# Patient Record
Sex: FEMALE | Race: WHITE | NOT HISPANIC OR LATINO | Employment: OTHER | URBAN - METROPOLITAN AREA
[De-identification: names, ages, dates, MRNs, and addresses within clinical notes are randomized per-mention and may not be internally consistent; named-entity substitution may affect disease eponyms.]

---

## 2017-01-17 ENCOUNTER — MYC MEDICAL ADVICE (OUTPATIENT)
Dept: PEDIATRICS | Facility: CLINIC | Age: 52
End: 2017-01-17

## 2017-01-17 NOTE — TELEPHONE ENCOUNTER
Diveboard message routed to provider for review and response. Please advise.      Beatrice LuisCMA

## 2017-01-17 NOTE — Clinical Note
January 17, 2017      Jaimee Rodriguez  [address] [address]              To Whom It May Concern,    I'm the primary care physical for Jaimee Rodriguez. She has been under my care since 12/10/2012. She has history of cervical spine fusion and chronic neck pain. She was evaluated by pain management specialist through [facility] and was discharged from their service in November 2013 after an infected pain pump. Prior to the pain pump trial, she was treated with a number of medications from over the counter medication through prescription medications (NSAIDS, acetaminophen, gabapentin, duloxetine, codeine, hydrocodone, oxycodone, lidocaine patch, diazepam, and cyclobenzaprine), and non-pharmacologic therapy through the pain program (physical therapy, heat, ice, injection, rhizotomy, TENS unit, and pain pump). When she was discharged from the [facility] pain program, she was on Fentanyl patch 100 mcg every 48 hours (15 patch per month) and hydrocodone-acetaminophen 10/325 mg every 6 hours. She has a pain contract with me for continuing prescription of her pain medications. The current pain medication regimen has allowed her to function and she has been compliant with pain management agreement.     I'm requesting a review of her case and approve her continue use of fentanyl patch.       Should you have any questions, please feel free to contact me at the address above.          Sincerely,          Maki Topete MD-PhD

## 2017-01-17 NOTE — TELEPHONE ENCOUNTER
Routing [place] message to PCP for review and okay to initiate Prior Authorization for fentaNYL (DURAGESIC) 100 mcg/hr 72 hr patch.  Sola Madrigal, CMA

## 2017-01-18 NOTE — TELEPHONE ENCOUNTER
Letter done. Please fax. check with patient where this needs to go. Or  Check on file where the last letter wetn.

## 2017-01-23 ENCOUNTER — MYC REFILL (OUTPATIENT)
Dept: PEDIATRICS | Facility: CLINIC | Age: 52
End: 2017-01-23

## 2017-01-23 DIAGNOSIS — M43.22 FUSION OF SPINE OF CERVICAL REGION: ICD-10-CM

## 2017-01-23 DIAGNOSIS — Z79.891 LONG TERM CURRENT USE OF OPIATE ANALGESIC: ICD-10-CM

## 2017-01-23 DIAGNOSIS — G89.29 CHRONIC NECK PAIN: ICD-10-CM

## 2017-01-23 DIAGNOSIS — M54.2 CHRONIC NECK PAIN: ICD-10-CM

## 2017-01-23 RX ORDER — FENTANYL 100 UG/1
1 PATCH TRANSDERMAL
Qty: 15 PATCH | Refills: 0 | Status: SHIPPED | OUTPATIENT
Start: 2017-01-25 | End: 2017-02-22

## 2017-01-23 RX ORDER — HYDROCODONE BITARTRATE AND ACETAMINOPHEN 10; 325 MG/1; MG/1
1 TABLET ORAL EVERY 6 HOURS PRN
Qty: 120 TABLET | Refills: 0 | Status: SHIPPED | OUTPATIENT
Start: 2017-01-25 | End: 2017-02-22

## 2017-01-23 NOTE — TELEPHONE ENCOUNTER
Message from [place]:  Original authorizing provider: Maki Topete MD, MD Jaimee Rodriguez would like a refill of the following medications:  fentaNYL (DURAGESIC) 100 mcg/hr 72 hr patch [Maki Topete MD, MD]  HYDROcodone-acetaminophen (NORCO)  MG per tablet [Maki Topete MD, MD]    Preferred pharmacy: WRITTEN PRESCRIPTION REQUESTED    Comment:

## 2017-01-23 NOTE — TELEPHONE ENCOUNTER
fentaNYL (DURAGESIC) 100 mcg/hr 72 hr patch      Last Written Prescription Date:  12/26/16  Last Fill Quantity: 15 patch,   # refills: 0  Last Office Visit with [facility] or [facility] prescribing provider: 12/19/16  Future Office visit:    Next 5 appointments (look out 90 days)     Feb 02, 2017 11:30 AM   [facility]    [address] [facility] [address]   [phone]            Feb 02, 2017  1:00 PM   Return Visit with David Morris Perlman, MD   [facility]    [address] [facility] [address]   [phone]                   Routing refill request to provider for review/approval because:  Drug not on the [facility], [facility] or [facility] refill protocol or controlled substance    HYDROcodone-acetaminophen (NORCO)  MG per tablet      Last Written Prescription Date:  12/26/16  Last Fill Quantity: 120,   # refills: 0  Last Office Visit with [facility], [facility] or [facility] prescribing provider: 12/19/16  Future Office visit:    Next 5 appointments (look out 90 days)     Feb 02, 2017 11:30 AM   SHORT with PFT LAB   [facility]    [address] [facility] [address]   [phone]            Feb 02, 2017  1:00 PM   Return Visit with David Morris Perlman, MD   [facility])    [address] [facility] [address]   [phone]                   Routing refill request to provider for review/approval because:  Drug not on the [facility], [facility] or [facility] refill protocol or controlled substance

## 2017-01-24 NOTE — TELEPHONE ENCOUNTER
Please inform patient, refill/prescription approved and when prescription hard copy is ready to be picked up at .

## 2017-01-26 ENCOUNTER — MYC MEDICAL ADVICE (OUTPATIENT)
Dept: PEDIATRICS | Facility: CLINIC | Age: 52
End: 2017-01-26

## 2017-01-30 NOTE — TELEPHONE ENCOUNTER
Called [facility] and spoke with Joni in the Prior Authorization department on behave of the patient and provider. Joni states that clinic was faxed a Prior Authorization form on 01/18/17. This form was competed and returned back to [facility] on 01/27/17. Joni states that Prior Authorization for patient's fentaNYL (DURAGESIC) 100 mcg/hr 72 hr patch has been approved.    Attempt #1:  Left message for patient to return call to clinic. Clinic number given.  Need to inform patient that PA has been approved for fentaNYL (DURAGESIC) 100 mcg/hr 72 hr patch. Also need to make sure that patient has current script dated 01/25/17 to fill.  Sola Madrigal, CMA

## 2017-01-31 NOTE — TELEPHONE ENCOUNTER
Called patient to inform that prior authorization was approved for Fentanyl patches. Patient states that her pharmacy contacted her on 01/28/17 to inform. Patient states that she had a current prescription to take to the pharmacy to fill since approval of GIGI Madrigal CMA

## 2017-02-02 ENCOUNTER — OFFICE VISIT (OUTPATIENT)
Dept: PULMONOLOGY | Facility: CLINIC | Age: 52
End: 2017-02-02
Payer: COMMERCIAL

## 2017-02-02 ENCOUNTER — OFFICE VISIT (OUTPATIENT)
Dept: NURSING | Facility: CLINIC | Age: 52
End: 2017-02-02
Payer: COMMERCIAL

## 2017-02-02 VITALS
WEIGHT: 193 LBS | HEART RATE: 100 BPM | SYSTOLIC BLOOD PRESSURE: 143 MMHG | BODY MASS INDEX: 34.72 KG/M2 | OXYGEN SATURATION: 91 % | DIASTOLIC BLOOD PRESSURE: 87 MMHG

## 2017-02-02 DIAGNOSIS — J44.1 OBSTRUCTIVE CHRONIC BRONCHITIS WITH EXACERBATION (H): ICD-10-CM

## 2017-02-02 DIAGNOSIS — J43.2 CENTRILOBULAR EMPHYSEMA (H): Primary | ICD-10-CM

## 2017-02-02 DIAGNOSIS — J44.9 COPD, SEVERE (H): ICD-10-CM

## 2017-02-02 DIAGNOSIS — J43.2 CENTRILOBULAR EMPHYSEMA (H): ICD-10-CM

## 2017-02-02 PROCEDURE — 99213 OFFICE O/P EST LOW 20 MIN: CPT | Mod: 25 | Performed by: INTERNAL MEDICINE

## 2017-02-02 PROCEDURE — 94375 RESPIRATORY FLOW VOLUME LOOP: CPT | Performed by: INTERNAL MEDICINE

## 2017-02-02 PROCEDURE — 94729 DIFFUSING CAPACITY: CPT | Performed by: INTERNAL MEDICINE

## 2017-02-02 RX ORDER — ALBUTEROL SULFATE 90 UG/1
2 AEROSOL, METERED RESPIRATORY (INHALATION) EVERY 6 HOURS PRN
Qty: 1 INHALER | Refills: 11 | Status: SHIPPED | OUTPATIENT
Start: 2017-02-02 | End: 2017-08-03

## 2017-02-02 NOTE — NURSING NOTE
"Jaimee Rodriguez's goals for this visit include: COPD  She requests these members of her care team be copied on today's visit information: yes    PCP: Maki Topete    Referring Provider:  No referring provider defined for this encounter.    Chief Complaint   Patient presents with     COPD       Initial /87 mmHg  Pulse 100  Wt 87.544 kg (193 lb)  SpO2 91% Estimated body mass index is 34.72 kg/(m^2) as calculated from the following:    Height as of 12/19/16: 1.588 m (5' 2.5\").    Weight as of this encounter: 87.544 kg (193 lb).  BP completed using cuff size: regular    Do you need any medication refills at today's visit? no    "

## 2017-02-02 NOTE — PROGRESS NOTES
HISTORY OF PRESENT ILLNESS:  Jaimee Rodriguez is a 51-year-old female with COPD and pulmonary nodules who follows up today.  Overall, the patient feels that her breathing has been relatively stable since last visit 6 months ago.  She does notice some increased chest tightness on occasion.  She is also using oxygen much more regularly than she had been previously generally using 2-3 liters most of the time during the day and at night.  She does use her albuterol inhaler generally before significant activities, but is not using it on a regular basis.  She continues to use her Dulera and Spiriva.  Has mild productive cough.  Otherwise, she generally feels things are stable with no other new complaints today.  She is still smoking about a half pack of cigarettes daily.      PULMONARY FUNCTION TESTS:  Show moderate to severe obstruction with moderate reduction in DLCO.  There has been a decline in her pulmonary function tests since the last test.      ASSESSMENT AND PLAN:  A 51-year-old female with COPD and pulmonary nodules.   1.  COPD.  Symptoms are relatively stable, although she does have some increased chest tightness and I suspect she has increased air trapping and that is why we see such significant drop in her FVC on her pulmonary function tests today.  However, symptomatically she is not that different at this point.  We did discuss various options including prednisone, which we decided to hold off on that for now.  I think we are going to try to have her use her albuterol inhaler more regularly, try to use it 2-3 times daily to see if this helps relieve some of that chest tightness and air trapping.  She is on fairly maximal inhaler therapy already in terms of long-acting inhalers so there is not that much to add to this.  At some point, we would consider a course of prednisone to see if that helps improve her symptoms along the concern that we would end up in a situation where she would get worse as soon as the  prednisone came off.  I will plan to see her back in 6 months with pulmonary function tests.   2.  Pulmonary nodules.  The patient is in the lung cancer screening program and is due for a CT later this month.  We will review that.         DAVID M. PERLMAN, MD             D: 2017 13:55   T: 2017 15:49   MT: [id number]      Name:     TWYLA VANN   MRN:      [id number]        Account:      [id number]   :      1965           Visit Date:   2017      Document: [id number]

## 2017-02-02 NOTE — MR AVS SNAPSHOT
After Visit Summary   2/2/2017    Jaimee Rodriguez    MRN: [id number]           Patient Information     Date Of Birth          1965        Visit Information        Provider Department      2/2/2017 1:00 PM Perlman, David Morris, MD [facility]        Today's Diagnoses     Centrilobular emphysema (H)    -  1     COPD, severe (H)         COPD, severe         Obstructive chronic bronchitis with exacerbation (H)            Follow-ups after your visit        Follow-up notes from your care team     Return in about 6 months (around 8/2/2017).      Your next 10 appointments already scheduled     Aug 03, 2017 10:00 AM   Office Visit with PFT LAB   [facility]    [address] [place] [address] [phone]           Bring a current list of meds and any records pertaining to this visit.  For Physicals, please bring immunization records and any forms needing to be filled out.  Please arrive 10 minutes early to complete paperwork.            Aug 03, 2017 11:00 AM   Return Visit with David Morris Perlman, MD   [facility])    [address] [place] [id number] [phone]              Future tests that were ordered for you today     Open Future Orders        Priority Expected Expires Ordered    General PFT Lab (Please always keep checked) Routine  2/2/2018 2/2/2017    Pulmonary Function Test Routine  2/2/2018 2/2/2017    6 minute walk test Routine  2/2/2018 2/2/2017    General PFT Lab (Please always keep checked) Routine  2/2/2018 2/2/2017            Who to contact     If you have questions or need follow up information about today's clinic visit or your schedule please contact [facility] directly at [phone].  Normal or non-critical lab and imaging results will be communicated to you by MyChart, letter or phone within 4 business days after the clinic  has received the results. If you do not hear from us within 7 days, please contact the clinic through [employer] or phone. If you have a critical or abnormal lab result, we will notify you by phone as soon as possible.  Submit refill requests through [employer] or call your pharmacy and they will forward the refill request to us. Please allow 3 business days for your refill to be completed.          Additional Information About Your Visit        [employer] Information     [employer] gives you secure access to your electronic health record. If you see a primary care provider, you can also send messages to your care team and make appointments. If you have questions, please call your primary care clinic.  If you do not have a primary care provider, please call [phone] and they will assist you.      [employer] is an electronic gateway that provides easy, online access to your medical records. With [employer], you can request a clinic appointment, read your test results, renew a prescription or communicate with your care team.     To access your existing account, please contact your [facility] or call [phone] for assistance.        Care EveryWhere ID     This is your Care EveryWhere ID. This could be used by other organizations to access your [facility] medical records  [id number]        Your Vitals Were     Pulse Pulse Oximetry                100 91%           Blood Pressure from Last 3 Encounters:   02/02/17 143/87   12/19/16 167/102   09/21/16 138/84    Weight from Last 3 Encounters:   02/02/17 87.544 kg (193 lb)   12/19/16 87.952 kg (193 lb 14.4 oz)   09/21/16 80.287 kg (177 lb)                 Where to get your medicines      These medications were sent to [id number] - [place], [place] - [address]  [address] [place] [address], [facility] [address]     Phone:  [phone]    - albuterol 108 (90 BASE) MCG/ACT Inhaler  - mometasone-formoterol 200-5 MCG/ACT oral inhaler  -  tiotropium 2.5 MCG/ACT inhalation aerosol       Primary Care Provider Office Phone # Fax #    Maki Topete [phone]       [facility] [address] [facility] [address]        Thank you!     Thank you for choosing [facility]  for your care. Our goal is always to provide you with excellent care. Hearing back from our patients is one way we can continue to improve our services. Please take a few minutes to complete the written survey that you may receive in the mail after your visit with us. Thank you!             Your Updated Medication List - Protect others around you: Learn how to safely use, store and throw away your medicines at www.disposemymeds.org.          This list is accurate as of: 2/2/17  1:23 PM.  Always use your most recent med list.                   Brand Name Dispense Instructions for use    albuterol 108 (90 BASE) MCG/ACT Inhaler    PROAIR HFA/PROVENTIL HFA/VENTOLIN HFA    1 Inhaler    Inhale 2 puffs into the lungs every 6 hours as needed for shortness of breath / dyspnea or wheezing       cyclobenzaprine 10 MG tablet    FLEXERIL    120 tablet    Take 1 tablet (10 mg) by mouth 3 times daily as needed for muscle spasms       * fentaNYL 100 mcg/hr 72 hr patch    DURAGESIC    15 patch    Place 1 patch onto the skin every 48 hours       * fentaNYL 100 mcg/hr 72 hr patch    DURAGESIC    15 patch    Place 1 patch onto the skin every 48 hours       gabapentin 600 MG tablet    NEURONTIN    90 tablet    TAKE 1 TABLET BY MOUTH THREE TIMES DAILY       GUAIATUSSIN -10 MG/5ML Soln solution   Generic drug:  guaiFENesin-codeine          HYDROcodone-acetaminophen  MG per tablet    NORCO    120 tablet    Take 1 tablet by mouth every 6 hours as needed for pain       methyl salicylate-menthol Oint     1 Tube    Apply 5 g topically every 6 hours as needed.       mometasone-formoterol 200-5 MCG/ACT oral inhaler    DULERA    1 Inhaler    Inhale 2 puffs into the  lungs 2 times daily       * order for DME     1 Box    Pain relieving creams, lotions, gels or patches ( containing Menthol) for occasional use for pain       * order for DME     1 Units    Equipment being ordered: Optichamber       * order for DME     1 Device    Equipment being ordered: super feet size C       * order for DME     1 Device    Equipment being ordered: Oxygen 4 lpm via nasal cannula continuous flow with any exertion and 2 lpm continuous flow with nasal cannula at rest and at night. Provide portability.  Length of need 99.       * order for DME     1 Device    Equipment being ordered: Superfeet - Blue - Size C       polyethylene glycol powder    MIRALAX    510 g    Take 17 g (1 capful) by mouth daily as needed for constipation       tiotropium 2.5 MCG/ACT inhalation aerosol    SPIRIVA RESPIMAT    12 g    Inhale 2 puffs into the lungs daily       traZODone 50 MG tablet    DESYREL    90 tablet    Take 1-2 tablets ( mg) by mouth At Bedtime AT BEDTIME FOR SLEEP       zolpidem 5 MG tablet    AMBIEN    30 tablet    Take 1 tablet (5 mg) by mouth nightly as needed for sleep       * Notice:  This list has 7 medication(s) that are the same as other medications prescribed for you. Read the directions carefully, and ask your doctor or other care provider to review them with you.

## 2017-02-02 NOTE — PROGRESS NOTES
Reason for Visit  Jaimee Rodriguez is a 51 year old year old female who is being seen for COPD    ILD HPI    See Dictated Note      Current Outpatient Prescriptions   Medication     mometasone-formoterol (DULERA) 200-5 MCG/ACT oral inhaler     tiotropium (SPIRIVA RESPIMAT) 2.5 MCG/ACT inhalation aerosol     albuterol (PROAIR HFA/PROVENTIL HFA/VENTOLIN HFA) 108 (90 BASE) MCG/ACT Inhaler     fentaNYL (DURAGESIC) 100 mcg/hr 72 hr patch     HYDROcodone-acetaminophen (NORCO)  MG per tablet     zolpidem (AMBIEN) 5 MG tablet     gabapentin (NEURONTIN) 600 MG tablet     traZODone (DESYREL) 50 MG tablet     cyclobenzaprine (FLEXERIL) 10 MG tablet     polyethylene glycol (MIRALAX) powder     fentaNYL (DURAGESIC) 100 mcg/hr patch 72 hr     order for DME     GUAIATUSSIN -10 MG/5ML SYRP     ORDER FOR DME     ORDER FOR DME     ORDER FOR DME     ORDER FOR DME     methyl salicylate-menthol (BENGAY) OINT     [DISCONTINUED] tiotropium (SPIRIVA RESPIMAT) 2.5 MCG/ACT inhalation aerosol     [DISCONTINUED] mometasone-formoterol (DULERA) 200-5 MCG/ACT oral inhaler     [DISCONTINUED] albuterol (PROAIR HFA, PROVENTIL HFA, VENTOLIN HFA) 108 (90 BASE) MCG/ACT inhaler     [DISCONTINUED] fluticasone-salmeterol (ADVAIR) 500-50 MCG/DOSE diskus inhaler     No current facility-administered medications for this visit.     Allergies   Allergen Reactions     Morphine      Past Medical History   Diagnosis Date     Contact dermatitis and other eczema, due to unspecified cause      eczema     Other and unspecified ovarian cyst      sydni R     Irregular menstrual cycle      Endometriosis, site unspecified      R uterosacral ligament/surgically removed     Other acne      NONSPECIFIC MEDICAL HISTORY      Mood swings, Irritability     Cervical spondylosis without myelopathy      With radiculopathy.     Tobacco use disorder 11/14/2001       Past Surgical History   Procedure Laterality Date     C ligate fallopian tube  1988     S/P tubal ligation      C total abdom hysterectomy       not total     pt did not have ovaries removed     C  delivery only  ,,     , Low Cervical     C appendectomy       Hc enlarge breast with implant       Surgical history of -   10/18/2005     C6 corpectomy with iliac crest autograft fixation. [facility]       Social History     Social History     Marital Status:      Spouse Name: N/A     Number of Children: N/A     Years of Education: N/A     Occupational History     Not on file.     Social History Main Topics     Smoking status: Current Every Day Smoker -- 0.75 packs/day for 35 years     Types: Cigarettes     Smokeless tobacco: Never Used      Comment: started age 14.     Alcohol Use: 0.0 oz/week     0 Standard drinks or equivalent per week      Comment: occ     Drug Use: No     Sexual Activity:     Partners: Male     Birth Control/ Protection: Surgical     Other Topics Concern      Service No     Blood Transfusions No     Caffeine Concern No     Occupational Exposure No     Hobby Hazards No     Sleep Concern Yes     Stress Concern No     Weight Concern No     Special Diet No     not a milk drinker     Back Care No     Exercise No     walks at work     Seat Belt Yes     Self-Exams No     Social History Narrative       Family History   Problem Relation Age of Onset     Arthritis Father      DIABETES Maternal Grandmother      CEREBROVASCULAR DISEASE Mother      CANCER Maternal Aunt      breast     Chronic Obstructive Pulmonary Disease Mother        ROS Pulmonary    A complete ROS was otherwise negative except as noted in the HPI.  Filed Vitals:    17 1304   BP: 143/87   Pulse: 100   Weight: 87.544 kg (193 lb)   SpO2: 91%     Exam:   GENERAL APPEARANCE: Well developed, well nourished, alert, and in no apparent distress.  NECK: supple, no masses, no thyromegaly.  LYMPHATICS: No significant axillary, cervical, or supraclavicular nodes.  RESP: normal percussion, decreased  BS bilaterally, scattered exp wheeze  CV: Normal S1, S2, regular rhythm, normal rate, no rub, no murmur,  no gallop, no LE edema.   ABDOMEN:  Bowel sounds normal, soft, nontender, no HSM or masses.   MS: extremities normal- no clubbing, no cyanosis.  NEURO: Mentation intact, speech normal, normal strength and tone, normal gait and stance  PSYCH: mentation appears normal. and affect normal/bright  Results: I have reviewed all imaging, PFTs and other relavent tests, please see below for details, PFT and imaging results were reviewed with the patient.  See Dictated Note  Assessment and plan:  See Dictated Note    CBC   Recent Labs   Lab Test  12/19/16   0855  09/26/13   1422   RBC  4.61  4.45   HGB  13.8  13.5   HCT  41.5  39.5   PLT  280  409       Basic Metabolic Panel  Recent Labs   Lab Test  12/19/16   0855  02/04/15   1201   NA  141  140   POTASSIUM  4.4  4.1   CHLORIDE  104  103   CO2  30  33*   BUN  13  20   GLC  100*  94   JOSEPH  9.0  9.1       INR  No results for input(s): INR in the last 75097 hours.    PFT  PFT 2/2/2017   FVC 2.41   FEV1 1.14   FVC% 74   FEV1% 44           CC:

## 2017-02-22 ENCOUNTER — MYC REFILL (OUTPATIENT)
Dept: PEDIATRICS | Facility: CLINIC | Age: 52
End: 2017-02-22

## 2017-02-22 DIAGNOSIS — Z79.891 LONG TERM CURRENT USE OF OPIATE ANALGESIC: ICD-10-CM

## 2017-02-22 DIAGNOSIS — G89.29 CHRONIC NECK PAIN: ICD-10-CM

## 2017-02-22 DIAGNOSIS — M43.22 FUSION OF SPINE OF CERVICAL REGION: ICD-10-CM

## 2017-02-22 DIAGNOSIS — M54.2 CHRONIC NECK PAIN: ICD-10-CM

## 2017-02-24 RX ORDER — FENTANYL 100 UG/1
1 PATCH TRANSDERMAL
Qty: 15 PATCH | Refills: 0 | Status: SHIPPED | OUTPATIENT
Start: 2017-02-24 | End: 2017-03-23

## 2017-02-24 RX ORDER — HYDROCODONE BITARTRATE AND ACETAMINOPHEN 10; 325 MG/1; MG/1
1 TABLET ORAL EVERY 6 HOURS PRN
Qty: 120 TABLET | Refills: 0 | Status: SHIPPED | OUTPATIENT
Start: 2017-02-24 | End: 2017-03-23

## 2017-03-02 DIAGNOSIS — F51.04 CHRONIC INSOMNIA: ICD-10-CM

## 2017-03-02 RX ORDER — ZOLPIDEM TARTRATE 5 MG/1
5 TABLET ORAL
Qty: 30 TABLET | Refills: 3 | Status: SHIPPED | OUTPATIENT
Start: 2017-03-02 | End: 2017-11-20

## 2017-03-02 NOTE — TELEPHONE ENCOUNTER
zolpidem (AMBIEN) 5 MG tablet      Last Written Prescription Date:  12/19/2016  Last Fill Quantity: 30 tablets,   # refills: 1 ordered   Last Office Visit with [facility] or [facility] prescribing provider: Last seen Dr. Topete on 12/19/2016  Future Office visit:       Routing refill request to provider for review/approval because:  Drug not on the [facility], [facility] or [facility] refill protocol or controlled substance

## 2017-03-03 NOTE — TELEPHONE ENCOUNTER
Please inform patient, refill/prescriptioni approved. Please fax prescription to designated pharmacy.

## 2017-03-06 ENCOUNTER — TELEPHONE (OUTPATIENT)
Dept: PULMONOLOGY | Facility: CLINIC | Age: 52
End: 2017-03-06

## 2017-03-06 NOTE — TELEPHONE ENCOUNTER
"Received fax from [place] [facility] with \"[place] Uniform Form for Prescription Drug Prior Authorization (PA) Requests and Formulary Exceptions\" for Spiriva Respimat. Unable to determine purpose of paperwork or what request is. Upon review of chart, Dr. Perlman sent Rx for Spiriva Respimat 2/2/17 to [facility] in [place].     Contacted [facility] and spoke to Pharmacist Rula. She states that Rx is going through on their side and no PA is needed. She advises to disregard paperwork. She states pt has already picked up medication. Paperwork sent to [facility] to be scanned into pt's chart.    Janie MOSLEY, RN, BSN  Pulmonary Care Coordinator         "

## 2017-03-09 ENCOUNTER — MYC MEDICAL ADVICE (OUTPATIENT)
Dept: PEDIATRICS | Facility: CLINIC | Age: 52
End: 2017-03-09

## 2017-03-09 DIAGNOSIS — Z12.31 ENCOUNTER FOR SCREENING MAMMOGRAM FOR BREAST CANCER: Primary | ICD-10-CM

## 2017-03-09 DIAGNOSIS — R91.8 PULMONARY NODULES: ICD-10-CM

## 2017-03-11 LAB
DLCOUNC-%PRED-PRE: 54 %
DLCOUNC-PRE: 11.8 ML/MIN/MMHG
DLCOUNC-PRED: 21.84 ML/MIN/MMHG
ERV-%PRED-PRE: 81 %
ERV-PRE: 0.41 L
ERV-PRED: 0.5 L
EXPTIME-PRE: 9.67 SEC
FEF2575-%PRED-PRE: 16 %
FEF2575-PRE: 0.41 L/SEC
FEF2575-PRED: 2.52 L/SEC
FEFMAX-%PRED-PRE: 56 %
FEFMAX-PRE: 3.63 L/SEC
FEFMAX-PRED: 6.41 L/SEC
FEV1-%PRED-PRE: 44 %
FEV1-PRE: 1.14 L
FEV1FEV6-PRE: 52 %
FEV1FEV6-PRED: 82 %
FEV1FVC-PRE: 48 %
FEV1FVC-PRED: 80 %
FEV1SVC-PRE: 44 %
FEV1SVC-PRED: 79 %
FIFMAX-PRE: 4.11 L/SEC
FVC-%PRED-PRE: 74 %
FVC-PRE: 2.41 L
FVC-PRED: 3.21 L
IC-%PRED-PRE: 79 %
IC-PRE: 2.19 L
IC-PRED: 2.74 L
VA-%PRED-PRE: 89 %
VA-PRE: 4.3 L
VC-%PRED-PRE: 80 %
VC-PRE: 2.6 L
VC-PRED: 3.24 L

## 2017-03-12 ENCOUNTER — HOSPITAL ENCOUNTER (OUTPATIENT)
Facility: CLINIC | Age: 52
Setting detail: SPECIMEN
Discharge: HOME | End: 2017-03-12
Admitting: INTERNAL MEDICINE
Payer: COMMERCIAL

## 2017-03-12 PROCEDURE — 82274 ASSAY TEST FOR BLOOD FECAL: CPT | Performed by: INTERNAL MEDICINE

## 2017-03-14 DIAGNOSIS — Z12.11 COLON CANCER SCREENING: ICD-10-CM

## 2017-03-14 LAB — HEMOCCULT STL QL IA: NEGATIVE

## 2017-03-14 NOTE — PROGRESS NOTES
Melody Rodriguez,    Attached are your test results.  -FIT test (screening test for colon cancer) was normal. ADVISE - rechecking in 1 year.   Please contact us if you have any questions.    Heidi Borja, CNP

## 2017-03-15 ENCOUNTER — RADIANT APPOINTMENT (OUTPATIENT)
Dept: MAMMOGRAPHY | Facility: CLINIC | Age: 52
End: 2017-03-15
Attending: INTERNAL MEDICINE
Payer: COMMERCIAL

## 2017-03-15 ENCOUNTER — RADIANT APPOINTMENT (OUTPATIENT)
Dept: CT IMAGING | Facility: CLINIC | Age: 52
End: 2017-03-15
Attending: INTERNAL MEDICINE
Payer: COMMERCIAL

## 2017-03-15 DIAGNOSIS — Z12.31 ENCOUNTER FOR SCREENING MAMMOGRAM FOR BREAST CANCER: ICD-10-CM

## 2017-03-15 DIAGNOSIS — R91.8 PULMONARY NODULES: ICD-10-CM

## 2017-03-15 LAB — RADIOLOGIST FLAGS: NORMAL

## 2017-03-15 PROCEDURE — G0202 SCR MAMMO BI INCL CAD: HCPCS | Performed by: RADIOLOGY

## 2017-03-15 PROCEDURE — 71250 CT THORAX DX C-: CPT | Performed by: RADIOLOGY

## 2017-03-17 NOTE — PROGRESS NOTES
Dear Jaimee,   Here are your recent results.   -- chest CT showed new lung nodules. The old ones are stable.   -- radiologist recommended repeat CT in 6 months.   -- do your best to quit smoking.     Please call or Mychart to our office if you have further questions.     Maki Topete MD

## 2017-03-20 ENCOUNTER — TELEPHONE (OUTPATIENT)
Dept: PEDIATRICS | Facility: CLINIC | Age: 52
End: 2017-03-20

## 2017-03-23 ENCOUNTER — MYC REFILL (OUTPATIENT)
Dept: PEDIATRICS | Facility: CLINIC | Age: 52
End: 2017-03-23

## 2017-03-23 DIAGNOSIS — Z79.891 LONG TERM CURRENT USE OF OPIATE ANALGESIC: ICD-10-CM

## 2017-03-23 DIAGNOSIS — M54.2 CHRONIC NECK PAIN: ICD-10-CM

## 2017-03-23 DIAGNOSIS — G89.29 CHRONIC NECK PAIN: ICD-10-CM

## 2017-03-23 DIAGNOSIS — M43.22 FUSION OF SPINE OF CERVICAL REGION: ICD-10-CM

## 2017-03-24 RX ORDER — FENTANYL 100 UG/1
1 PATCH TRANSDERMAL
Qty: 15 PATCH | Refills: 0 | Status: SHIPPED | OUTPATIENT
Start: 2017-03-24 | End: 2017-04-24

## 2017-03-24 RX ORDER — HYDROCODONE BITARTRATE AND ACETAMINOPHEN 10; 325 MG/1; MG/1
1 TABLET ORAL EVERY 6 HOURS PRN
Qty: 120 TABLET | Refills: 0 | Status: SHIPPED | OUTPATIENT
Start: 2017-03-24 | End: 2017-04-24

## 2017-03-24 NOTE — TELEPHONE ENCOUNTER
fentaNYL (DURAGESIC) 100 mcg/hr 72 hr patch      Last Written Prescription Date:  2/24/17  Last Fill Quantity: 15 patch,   # refills: 0  Last Office Visit with [facility], [facility] or  Health prescribing provider: 12/19/16  Future Office visit:       Routing refill request to provider for review/approval because:  Drug not on the [facility], P or  Health refill protocol or controlled substance    HYDROcodone-acetaminophen (NORCO)  MG per tablet      Last Written Prescription Date:  2/24/17  Last Fill Quantity: 120,   # refills: 0  Last Office Visit with [facility], [facility] or [facility] prescribing provider: 12/19/16  Future Office visit:       Routing refill request to provider for review/approval because:  Drug not on the [facility], P or  Health refill protocol or controlled substance    Dr. Maki Topete out of office until 4/5/17. Routed to provider pool for review.  Emory Man, CMA

## 2017-03-24 NOTE — TELEPHONE ENCOUNTER
Message from Noet:  Original authorizing provider: Maki Topete MD, MD Hartjony FITZPATRICKIlda Velásquezcharlie would like a refill of the following medications:  fentaNYL (DURAGESIC) 100 mcg/hr 72 hr patch [Maki Topete MD, MD]  HYDROcodone-acetaminophen (NORCO)  MG per tablet [Maki Topete MD, MD]    Preferred pharmacy: WRITTEN PRESCRIPTION REQUESTED    Comment:  The written scripts can go to the [facility]. Let me know when this happens as I need to request the pharmacy to fill the Fentanyl patches with the Mylan brand as they stay on better

## 2017-03-29 ENCOUNTER — TELEPHONE (OUTPATIENT)
Dept: PEDIATRICS | Facility: CLINIC | Age: 52
End: 2017-03-29

## 2017-03-29 NOTE — TELEPHONE ENCOUNTER
[facility] Call Center    Phone Message    Name of Caller: Shubham    Phone Number: Other phone number:  [phone]    Best time to return call: Any    May a detailed message be left on voicemail: yes    Relation to patient: Other Name: Shubham  Relationship: [facility] [employer]  Is there legal documentation in chart to discuss information with this person: NA    Reason for Call: Shubham from [employer] called to say that Jaimee is going to be scheduling a visit with Dr. Topete.  Shubham said that Jaimee is switching to Medicare and [facility] [employer] will be doing her supplemental oxygen.  For the insurance to cover the home oxygen Jaimee is required to have a face to face with Dr. Topete and discuss at length Jaimee's COPD, and the necessity for oxygen. Shubham also requested an order for PFT including a six minute walk.  Shubham is sending a new order to be signed for oxygen.  Thank you.     Action Taken: Message routed to:  Primary Care p [phone]

## 2017-04-03 ENCOUNTER — OFFICE VISIT (OUTPATIENT)
Dept: NURSING | Facility: CLINIC | Age: 52
End: 2017-04-03

## 2017-04-03 DIAGNOSIS — J43.2 CENTRILOBULAR EMPHYSEMA (H): ICD-10-CM

## 2017-04-03 NOTE — MR AVS SNAPSHOT
After Visit Summary   4/3/2017    Jaimee Rodriguez    MRN: [id number]           Patient Information     Date Of Birth          1965        Visit Information        Provider Department      4/3/2017 10:00 AM PFT LAB [facility]        Today's Diagnoses     Centrilobular emphysema (H)           Follow-ups after your visit        Your next 10 appointments already scheduled     Aug 03, 2017 10:00 AM CDT   Office Visit with PFT LAB   [facility] ([facility])    [address] [place] [address] [phone]           Bring a current list of meds and any records pertaining to this visit.  For Physicals, please bring immunization records and any forms needing to be filled out.  Please arrive 10 minutes early to complete paperwork.            Aug 03, 2017 11:00 AM CDT   Return Visit with David Morris Perlman, MD   [facility])    [address] [place] [address] [phone]              Who to contact     If you have questions or need follow up information about today's clinic visit or your schedule please contact [facility] directly at [phone].  Normal or non-critical lab and imaging results will be communicated to you by MyChart, letter or phone within 4 business days after the clinic has received the results. If you do not hear from us within 7 days, please contact the clinic through [employer] or phone. If you have a critical or abnormal lab result, we will notify you by phone as soon as possible.  Submit refill requests through [employer] or call your pharmacy and they will forward the refill request to us. Please allow 3 business days for your refill to be completed.          Additional Information About Your Visit        [employer] Information     [employer] gives you secure access to your electronic health record. If you see a primary care provider, you can also  send messages to your care team and make appointments. If you have questions, please call your primary care clinic.  If you do not have a primary care provider, please call [phone] and they will assist you.      [employer] is an electronic gateway that provides easy, online access to your medical records. With [employer], you can request a clinic appointment, read your test results, renew a prescription or communicate with your care team.     To access your existing account, please contact your [facility] or call [phone] for assistance.        Care EveryWhere ID     This is your Care EveryWhere ID. This could be used by other organizations to access your [facility] medical records  [id number]         Blood Pressure from Last 3 Encounters:   02/02/17 143/87   12/19/16 (!) 167/102   09/21/16 138/84    Weight from Last 3 Encounters:   02/02/17 87.5 kg (193 lb)   12/19/16 88 kg (193 lb 14.4 oz)   09/21/16 80.3 kg (177 lb)              We Performed the Following     6 minute walk test     6 minute walk test     General PFT Lab (Please always keep checked)     General PFT Lab (Please always keep checked)     Pulmonary Function Test        Primary Care Provider Office Phone # Fax #    Maki Topete [phone]       [facility] [address] [facility] [address]        Thank you!     Thank you for choosing [facility]  for your care. Our goal is always to provide you with excellent care. Hearing back from our patients is one way we can continue to improve our services. Please take a few minutes to complete the written survey that you may receive in the mail after your visit with us. Thank you!             Your Updated Medication List - Protect others around you: Learn how to safely use, store and throw away your medicines at www.disposemymeds.org.          This list is accurate as of: 4/3/17 11:59 PM.  Always use your most recent med list.                    Brand Name Dispense Instructions for use    albuterol 108 (90 BASE) MCG/ACT Inhaler    PROAIR HFA/PROVENTIL HFA/VENTOLIN HFA    1 Inhaler    Inhale 2 puffs into the lungs every 6 hours as needed for shortness of breath / dyspnea or wheezing       cyclobenzaprine 10 MG tablet    FLEXERIL    120 tablet    Take 1 tablet (10 mg) by mouth 3 times daily as needed for muscle spasms       * fentaNYL 100 mcg/hr 72 hr patch    DURAGESIC    15 patch    Place 1 patch onto the skin every 48 hours       * fentaNYL 100 mcg/hr 72 hr patch    DURAGESIC    15 patch    Place 1 patch onto the skin every 48 hours       gabapentin 600 MG tablet    NEURONTIN    90 tablet    TAKE 1 TABLET BY MOUTH THREE TIMES DAILY       GUAIATUSSIN -10 MG/5ML Soln solution   Generic drug:  guaiFENesin-codeine          HYDROcodone-acetaminophen  MG per tablet    NORCO    120 tablet    Take 1 tablet by mouth every 6 hours as needed for pain       methyl salicylate-menthol Oint     1 Tube    Apply 5 g topically every 6 hours as needed.       mometasone-formoterol 200-5 MCG/ACT oral inhaler    DULERA    1 Inhaler    Inhale 2 puffs into the lungs 2 times daily       * order for DME     1 Box    Pain relieving creams, lotions, gels or patches ( containing Menthol) for occasional use for pain       * order for DME     1 Units    Equipment being ordered: Optichamber       * order for DME     1 Device    Equipment being ordered: super feet size C       * order for DME     1 Device    Equipment being ordered: Oxygen 4 lpm via nasal cannula continuous flow with any exertion and 2 lpm continuous flow with nasal cannula at rest and at night. Provide portability.  Length of need 99.       * order for DME     1 Device    Equipment being ordered: Superfeet - Blue - Size C       polyethylene glycol powder    MIRALAX    510 g    Take 17 g (1 capful) by mouth daily as needed for constipation       tiotropium 2.5 MCG/ACT inhalation aerosol    SPIRIVA  RESPIMAT    12 g    Inhale 2 puffs into the lungs daily       traZODone 50 MG tablet    DESYREL    90 tablet    Take 1-2 tablets ( mg) by mouth At Bedtime AT BEDTIME FOR SLEEP       zolpidem 5 MG tablet    AMBIEN    30 tablet    Take 1 tablet (5 mg) by mouth nightly as needed for sleep       * Notice:  This list has 7 medication(s) that are the same as other medications prescribed for you. Read the directions carefully, and ask your doctor or other care provider to review them with you.

## 2017-04-03 NOTE — TELEPHONE ENCOUNTER
This needs to go to her pulmonologist who just saw her in February. Should be within the Medicare required Face to Face encounter period.

## 2017-04-04 NOTE — TELEPHONE ENCOUNTER
Shubham from [facility] returned call to clinic and states pt's insurance changed and she is having to transfer to [facility] from [facility] for DME. She states pt needs to be seen ASAP and past visits will not be sufficient for documentation. She states pt had 6 min walk done yesterday and face to face visit needs to be within 30 days of this test. She states pt was using [facility] and needs to transfer her service ASAP.    Upon review of Dr. Perlman's schedule first opening of 5/4/17 will not meet 30 day criteria (would be 31 days). Encounter routed back to Dr. Topete and Carla Lan RN to make them aware of need for pt to have face to face visit.    Janie MOSLEY RN, BSN  Pulmonary Care Coordinator

## 2017-04-04 NOTE — PROGRESS NOTES
6 Min Walk Note:  1.  Walked patient in gtz while monitoring with oximetry on RA. Patieint walked 750 feet with 1 rest; lowest SPO2 85%.  2.   After rest, walked patient on 2 LPM while monitoring on oximetry.  Patient walked 1050 with no rests; lowest SPO2 91% on 2 LPM.

## 2017-04-04 NOTE — TELEPHONE ENCOUNTER
Upon review of chart, 6 min walk test was completed yesterday 4/3/17. Pt's LOV with Dr. Perlman was 2/2/17 and next scheduled appt is 8/3/17. His next opening is currently 5/4/17. Attempted to contact Shubham to clarify timeline for face to face visit, verify she has correct fax number, and advise her of which provider will be ordering MD for pt's 02.    Janie MOSLEY RN, BSN  Pulmonary Care Coordinator

## 2017-04-05 NOTE — TELEPHONE ENCOUNTER
I called patient and we scheduled an appt for her to see Dr. Topete on 04/06/17 to follow up on her COPD and switching to Medicare insurance so now her DME will be through [facility] instead of [facility].    Maribell Macias CMA

## 2017-04-05 NOTE — TELEPHONE ENCOUNTER
Routed to Dr. Topete to address.    Carla Lan RN,   [facility], [facility]

## 2017-04-06 ENCOUNTER — OFFICE VISIT (OUTPATIENT)
Dept: PEDIATRICS | Facility: CLINIC | Age: 52
End: 2017-04-06
Payer: COMMERCIAL

## 2017-04-06 VITALS
OXYGEN SATURATION: 93 % | WEIGHT: 201.4 LBS | SYSTOLIC BLOOD PRESSURE: 143 MMHG | BODY MASS INDEX: 35.68 KG/M2 | TEMPERATURE: 97.7 F | DIASTOLIC BLOOD PRESSURE: 93 MMHG | HEIGHT: 63 IN | HEART RATE: 85 BPM

## 2017-04-06 DIAGNOSIS — M62.838 MUSCLE SPASTICITY: Primary | ICD-10-CM

## 2017-04-06 DIAGNOSIS — G89.29 CHRONIC NECK PAIN: ICD-10-CM

## 2017-04-06 DIAGNOSIS — F51.04 CHRONIC INSOMNIA: ICD-10-CM

## 2017-04-06 DIAGNOSIS — M54.2 CHRONIC NECK PAIN: ICD-10-CM

## 2017-04-06 PROCEDURE — 99213 OFFICE O/P EST LOW 20 MIN: CPT | Performed by: INTERNAL MEDICINE

## 2017-04-06 RX ORDER — ESZOPICLONE 2 MG/1
2 TABLET, FILM COATED ORAL AT BEDTIME
Qty: 30 TABLET | Refills: 3 | Status: SHIPPED | OUTPATIENT
Start: 2017-04-06 | End: 2017-11-20

## 2017-04-06 NOTE — PROGRESS NOTES
"  SUBJECTIVE:                                                    Jaimee Rodriguez is a 51 year old female who presents to clinic today for the following health issues:      Medication Followup of  ALL MEDICATIONS    Taking Medication as prescribed: yes    Side Effects:  None    Medication Helping Symptoms:  Pt. States \"so so\"    Pt. Would like to discuss Quanitity limit on certain meds. Today per insurance.     This appointment was originally scheduled for face to face encounter for oxygen order due to insurance change but this is no longer necessary.   She does need to have some drugs changes.     Fentanyl patch q48 hours, needs quantity limit PA. Needs letter for this.   Flexeril, needs FDA indicated use and dosing only. Per her formulary, other muscle relaxer covered are baclofen, dantrolene, and tizanidine.   Ambien CR 6.25 - needs a different drug. Trazodone didn't help before, ambien didn't help. Sleeps only 1 1/2 hour and will wake up. Wakes up easily. Per her formulary, Lunesta is covered as tier 2.          Problem list, Medication list, Allergies, and Medical/Social/Surgical histories reviewed in [facility] and updated as appropriate.    OBJECTIVE:                                                    BP (!) 143/93 (BP Location: Right arm, Patient Position: Chair, Cuff Size: Adult Large)  Pulse 85  Temp 97.7  F (36.5  C) (Temporal)  Ht 5' 2.5\" (1.588 m)  Wt 201 lb 6.4 oz (91.4 kg)  SpO2 93%  BMI 36.25 kg/m2    GEN: healthy, alert and no distress       Diagnostic test results:  No results found for this or any previous visit (from the past 24 hour(s)).       ASSESSMENT/PLAN:                                                      51 year old female with the following diagnoses and treatment plan:      ICD-10-CM    1. Muscle spasticity M62.838 tiZANidine (ZANAFLEX) 4 MG tablet   2. Chronic neck pain M54.2 tiZANidine (ZANAFLEX) 4 MG tablet    G89.29    3. Chronic insomnia F51.04 eszopiclone (LUNESTA) 2 MG tablet "       -- changed her med's per insurance requirement. Letter for Fentanyl done and will fax to insurance today.       Maki Topete MD-PhD  [facility]    (Note: Chart documentation was done in part with Dragon Voice Recognition software. Although reviewed after completion, some word and grammatical errors may remain.)

## 2017-04-06 NOTE — NURSING NOTE
"Chief Complaint   Patient presents with     Recheck Medication       Initial BP (!) 143/93 (BP Location: Right arm, Patient Position: Chair, Cuff Size: Adult Large)  Pulse 85  Temp 97.7  F (36.5  C) (Temporal)  Ht 5' 2.5\" (1.588 m)  Wt 201 lb 6.4 oz (91.4 kg)  SpO2 93%  BMI 36.25 kg/m2 Estimated body mass index is 36.25 kg/(m^2) as calculated from the following:    Height as of this encounter: 5' 2.5\" (1.588 m).    Weight as of this encounter: 201 lb 6.4 oz (91.4 kg).  Medication Reconciliation: complete     Purvi Diaz, RUSLAN    "

## 2017-04-06 NOTE — LETTER
April 6, 2017      Jaimee Rodriguez  [address] [address]        [facility]  Fax: [phone]        To Whom It May Concern,    I'm the primary care physical for Jaimee Rodriguez. She has been under my care since 12/10/2012. She has history of cervical spine fusion and chronic neck pain. She was evaluated by pain management specialist through [facility] and was discharged from their service in November 2013 after an infected pain pump. Prior to the pain pump trial, she was treated with a number of medications from over the counter medication through prescription medications (NSAIDS, acetaminophen, gabapentin, duloxetine, codeine, hydrocodone, oxycodone, lidocaine patch, diazepam, and cyclobenzaprine), and non-pharmacologic therapy through the pain program (physical therapy, heat, ice, injection, rhizotomy, TENS unit, and pain pump). When she was discharged from the [facility] pain program, she was on Fentanyl patch 100 mcg every 48 hours (15 patch per month) and hydrocodone-acetaminophen 10/325 mg every 6 hours. She has a pain contract with me for continuing prescription of her pain medications. The current pain medication regimen has allowed her to function and she has been compliant with pain management agreement.     I'm requesting a review of her case and approve her continue use of fentanyl patch.       Should you have any questions, please feel free to contact me at the address above.          Sincerely,          Maki Topete MD-PhD

## 2017-04-06 NOTE — MR AVS SNAPSHOT
After Visit Summary   4/6/2017    Jaimee Rodriguez    MRN: [id number]           Patient Information     Date Of Birth          1965        Visit Information        Provider Department      4/6/2017 1:20 PM Maki Topete MD [facility]        Today's Diagnoses     Muscle spasticity    -  1    Chronic neck pain        Chronic insomnia          Care Instructions    Medication(s) prescribed today:    Orders Placed This Encounter   Medications     tiZANidine (ZANAFLEX) 4 MG tablet     Sig: Take 2 tablets (8 mg) by mouth 3 times daily     Dispense:  90 tablet     Refill:  3     eszopiclone (LUNESTA) 2 MG tablet     Sig: Take 1 tablet (2 mg) by mouth At Bedtime     Dispense:  30 tablet     Refill:  3             Medication discontinued today:  Medications Discontinued During This Encounter   Medication Reason     cyclobenzaprine (FLEXERIL) 10 MG tablet Reorder                Follow-ups after your visit        Your next 10 appointments already scheduled     Aug 03, 2017 10:00 AM CDT   Office Visit with PFT LAB   [facility] ([facility])    [address] [place] [address] [phone]           Bring a current list of meds and any records pertaining to this visit.  For Physicals, please bring immunization records and any forms needing to be filled out.  Please arrive 10 minutes early to complete paperwork.            Aug 03, 2017 11:00 AM CDT   Return Visit with David Morris Perlman, MD   [facility] ([facility])    [address] [place] [address] [phone]              Who to contact     If you have questions or need follow up information about today's clinic visit or your schedule please contact [facility] directly at [phone].  Normal or non-critical lab and imaging results will be communicated to you by MyChart, letter or phone within 4 business days after the  "clinic has received the results. If you do not hear from us within 7 days, please contact the clinic through [employer] or phone. If you have a critical or abnormal lab result, we will notify you by phone as soon as possible.  Submit refill requests through [employer] or call your pharmacy and they will forward the refill request to us. Please allow 3 business days for your refill to be completed.          Additional Information About Your Visit        [employer] Information     [employer] gives you secure access to your electronic health record. If you see a primary care provider, you can also send messages to your care team and make appointments. If you have questions, please call your primary care clinic.  If you do not have a primary care provider, please call [phone] and they will assist you.      [employer] is an electronic gateway that provides easy, online access to your medical records. With [employer], you can request a clinic appointment, read your test results, renew a prescription or communicate with your care team.     To access your existing account, please contact your [facility] or call [phone] for assistance.        Care EveryWhere ID     This is your Care EveryWhere ID. This could be used by other organizations to access your [facility] medical records  [id number]        Your Vitals Were     Pulse Temperature Height Pulse Oximetry BMI (Body Mass Index)       85 97.7  F (36.5  C) (Temporal) 5' 2.5\" (1.588 m) 93% 36.25 kg/m2        Blood Pressure from Last 3 Encounters:   04/06/17 (!) 143/93   02/02/17 143/87   12/19/16 (!) 167/102    Weight from Last 3 Encounters:   04/06/17 201 lb 6.4 oz (91.4 kg)   02/02/17 193 lb (87.5 kg)   12/19/16 193 lb 14.4 oz (88 kg)              Today, you had the following     No orders found for display         Today's Medication Changes          These changes are accurate as of: 4/6/17  1:47 PM.  If you have any questions, ask your nurse or " doctor.               Start taking these medicines.        Dose/Directions    eszopiclone 2 MG tablet   Commonly known as:  LUNESTA   Used for:  Chronic insomnia   Started by:  Maki Topete MD        Dose:  2 mg   Take 1 tablet (2 mg) by mouth At Bedtime   Quantity:  30 tablet   Refills:  3       tiZANidine 4 MG tablet   Commonly known as:  ZANAFLEX   Used for:  Chronic neck pain, Muscle spasticity   Replaces:  cyclobenzaprine 10 MG tablet   Started by:  Maki Topete MD        Dose:  8 mg   Take 2 tablets (8 mg) by mouth 3 times daily   Quantity:  90 tablet   Refills:  3         Stop taking these medicines if you haven't already. Please contact your care team if you have questions.     cyclobenzaprine 10 MG tablet   Commonly known as:  FLEXERIL   Replaced by:  tiZANidine 4 MG tablet   Stopped by:  Maki Topete MD                Where to get your medicines      These medications were sent to [facility] [address] [address] [address]     Phone:  [phone]     tiZANidine 4 MG tablet         Some of these will need a paper prescription and others can be bought over the counter.  Ask your nurse if you have questions.     Bring a paper prescription for each of these medications     eszopiclone 2 MG tablet                Primary Care Provider Office Phone # Fax #    Maki Topete [phone]       [facility] [address]  [facility] [address]        Thank you!     Thank you for choosing [facility]  for your care. Our goal is always to provide you with excellent care. Hearing back from our patients is one way we can continue to improve our services. Please take a few minutes to complete the written survey that you may receive in the mail after your visit with us. Thank you!             Your Updated Medication List - Protect others around you: Learn how to safely use, store and throw away your medicines at  www.disposemymeds.org.          This list is accurate as of: 4/6/17  1:47 PM.  Always use your most recent med list.                   Brand Name Dispense Instructions for use    albuterol 108 (90 BASE) MCG/ACT Inhaler    PROAIR HFA/PROVENTIL HFA/VENTOLIN HFA    1 Inhaler    Inhale 2 puffs into the lungs every 6 hours as needed for shortness of breath / dyspnea or wheezing       eszopiclone 2 MG tablet    LUNESTA    30 tablet    Take 1 tablet (2 mg) by mouth At Bedtime       * fentaNYL 100 mcg/hr 72 hr patch    DURAGESIC    15 patch    Place 1 patch onto the skin every 48 hours       * fentaNYL 100 mcg/hr 72 hr patch    DURAGESIC    15 patch    Place 1 patch onto the skin every 48 hours       gabapentin 600 MG tablet    NEURONTIN    90 tablet    TAKE 1 TABLET BY MOUTH THREE TIMES DAILY       GUAIATUSSIN -10 MG/5ML Soln solution   Generic drug:  guaiFENesin-codeine          HYDROcodone-acetaminophen  MG per tablet    NORCO    120 tablet    Take 1 tablet by mouth every 6 hours as needed for pain       methyl salicylate-menthol Oint     1 Tube    Apply 5 g topically every 6 hours as needed.       mometasone-formoterol 200-5 MCG/ACT oral inhaler    DULERA    1 Inhaler    Inhale 2 puffs into the lungs 2 times daily       * order for DME     1 Box    Pain relieving creams, lotions, gels or patches ( containing Menthol) for occasional use for pain       * order for DME     1 Units    Equipment being ordered: Optichamber       * order for DME     1 Device    Equipment being ordered: super feet size C       * order for DME     1 Device    Equipment being ordered: Oxygen 4 lpm via nasal cannula continuous flow with any exertion and 2 lpm continuous flow with nasal cannula at rest and at night. Provide portability.  Length of need 99.       * order for DME     1 Device    Equipment being ordered: Superfeet - Blue - Size C       polyethylene glycol powder    MIRALAX    510 g    Take 17 g (1 capful) by mouth daily as  needed for constipation       tiotropium 2.5 MCG/ACT inhalation aerosol    SPIRIVA RESPIMAT    12 g    Inhale 2 puffs into the lungs daily       tiZANidine 4 MG tablet    ZANAFLEX    90 tablet    Take 2 tablets (8 mg) by mouth 3 times daily       traZODone 50 MG tablet    DESYREL    90 tablet    Take 1-2 tablets ( mg) by mouth At Bedtime AT BEDTIME FOR SLEEP       zolpidem 5 MG tablet    AMBIEN    30 tablet    Take 1 tablet (5 mg) by mouth nightly as needed for sleep       * Notice:  This list has 7 medication(s) that are the same as other medications prescribed for you. Read the directions carefully, and ask your doctor or other care provider to review them with you.

## 2017-04-06 NOTE — PATIENT INSTRUCTIONS
Medication(s) prescribed today:    Orders Placed This Encounter   Medications     tiZANidine (ZANAFLEX) 4 MG tablet     Sig: Take 2 tablets (8 mg) by mouth 3 times daily     Dispense:  90 tablet     Refill:  3     eszopiclone (LUNESTA) 2 MG tablet     Sig: Take 1 tablet (2 mg) by mouth At Bedtime     Dispense:  30 tablet     Refill:  3             Medication discontinued today:  Medications Discontinued During This Encounter   Medication Reason     cyclobenzaprine (FLEXERIL) 10 MG tablet Reorder

## 2017-04-24 ENCOUNTER — MYC REFILL (OUTPATIENT)
Dept: PEDIATRICS | Facility: CLINIC | Age: 52
End: 2017-04-24

## 2017-04-24 DIAGNOSIS — Z79.891 LONG TERM CURRENT USE OF OPIATE ANALGESIC: ICD-10-CM

## 2017-04-24 DIAGNOSIS — M43.22 FUSION OF SPINE OF CERVICAL REGION: ICD-10-CM

## 2017-04-24 DIAGNOSIS — G89.29 CHRONIC NECK PAIN: ICD-10-CM

## 2017-04-24 DIAGNOSIS — M54.2 CHRONIC NECK PAIN: ICD-10-CM

## 2017-04-24 RX ORDER — FENTANYL 100 UG/1
1 PATCH TRANSDERMAL
Qty: 15 PATCH | Refills: 0 | Status: SHIPPED | OUTPATIENT
Start: 2017-04-24 | End: 2017-05-22

## 2017-04-24 RX ORDER — HYDROCODONE BITARTRATE AND ACETAMINOPHEN 10; 325 MG/1; MG/1
1 TABLET ORAL EVERY 6 HOURS PRN
Qty: 120 TABLET | Refills: 0 | Status: SHIPPED | OUTPATIENT
Start: 2017-04-24 | End: 2017-05-22

## 2017-04-24 NOTE — TELEPHONE ENCOUNTER
HYDROcodone-acetaminophen (NORCO)  MG per tablet      Last Written Prescription Date:  03/24/17  Last Fill Quantity: 120,   # refills: 0  Last Office Visit with [facility], [facility] or [facility] prescribing provider: 04/06/17.  Future Office visit:       Routing refill request to provider for review/approval because:  Drug not on the [facility], [facility] or [facility] refill protocol or controlled substance      fentaNYL (DURAGESIC) 100 mcg/hr 72 hr patch      Last Written Prescription Date:  03/24/17  Last Fill Quantity: 15,   # refills: 0  Last Office Visit with [facility], [facility] or [facility] prescribing provider: 04/06/17.  Future Office visit:       Routing refill request to provider for review/approval because:  Drug not on the [facility], [facility] or  [facility] refill protocol or controlled substance.

## 2017-04-24 NOTE — TELEPHONE ENCOUNTER
Message from MyChart:  Original authorizing provider: Carlyn Helm MD    Jaimee FITZPATRICKIlda Velásquezcharlie would like a refill of the following medications:  fentaNYL (DURAGESIC) 100 mcg/hr 72 hr patch [Carlyn Helm MD]  HYDROcodone-acetaminophen (NORCO)  MG per tablet [Carlyn Helm MD]    Preferred pharmacy: WRITTEN PRESCRIPTION REQUESTED    Comment:  could you please give them to [facility] to fill.

## 2017-05-22 ENCOUNTER — MYC REFILL (OUTPATIENT)
Dept: PEDIATRICS | Facility: CLINIC | Age: 52
End: 2017-05-22

## 2017-05-22 DIAGNOSIS — M54.2 CHRONIC NECK PAIN: ICD-10-CM

## 2017-05-22 DIAGNOSIS — Z79.891 LONG TERM CURRENT USE OF OPIATE ANALGESIC: ICD-10-CM

## 2017-05-22 DIAGNOSIS — M43.22 FUSION OF SPINE OF CERVICAL REGION: ICD-10-CM

## 2017-05-22 DIAGNOSIS — G89.29 CHRONIC NECK PAIN: ICD-10-CM

## 2017-05-22 RX ORDER — FENTANYL 100 UG/1
1 PATCH TRANSDERMAL
Qty: 15 PATCH | Refills: 0 | Status: SHIPPED | OUTPATIENT
Start: 2017-05-24 | End: 2017-06-23

## 2017-05-22 RX ORDER — HYDROCODONE BITARTRATE AND ACETAMINOPHEN 10; 325 MG/1; MG/1
1 TABLET ORAL EVERY 6 HOURS PRN
Qty: 120 TABLET | Refills: 0 | Status: SHIPPED | OUTPATIENT
Start: 2017-05-24 | End: 2017-06-23

## 2017-05-22 NOTE — TELEPHONE ENCOUNTER
fentaNYL (DURAGESIC) 100 mcg/hr 72 hr patch      Last Written Prescription Date:  4/24/17  Last Fill Quantity: 15 patch,   # refills: 0  Last Office Visit with [facility] or [facility] prescribing provider: 4/6/17  Future Office visit:    Next 5 appointments (look out 90 days)     Aug 03, 2017 10:00 AM CDT   [facility]   [facility]    [address] [facility] [address]   [phone]            Aug 03, 2017 11:00 AM CDT   Return Visit with David Morris Perlman, MD   [facility]    [address] [address] [place] [address]   [phone]                   Routing refill request to provider for review/approval because:  Drug not on the [facility], [facility] or [facility] refill protocol or controlled substance    HYDROcodone-acetaminophen (NORCO)  MG per tablet      Last Written Prescription Date:  4/24/17  Last Fill Quantity: 120,   # refills: 0  Last Office Visit with [facility], [facility] or [facility] prescribing provider: 4/6/17  Future Office visit:    Next 5 appointments (look out 90 days)     Aug 03, 2017 10:00 AM CDT   Office Visit with PFT LAB   [facility]    [address] [facility] [address]   [phone]            Aug 03, 2017 11:00 AM CDT   Return Visit with David Morris Perlman, MD   [facility])    [address] [facility] [address]   [phone]                   Routing refill request to provider for review/approval because:  Drug not on the [facility], [facility] or [facility] refill protocol or controlled substance

## 2017-05-22 NOTE — TELEPHONE ENCOUNTER
Message from MyChart:  Original authorizing provider: Maki Topete MD, MD Jaimee Rodriguez would like a refill of the following medications:  fentaNYL (DURAGESIC) 100 mcg/hr 72 hr patch [Maki Topete MD, MD]  HYDROcodone-acetaminophen (NORCO)  MG per tablet [Maki Topete MD, MD]    Preferred pharmacy: [facility] [address]    Comment:

## 2017-06-23 ENCOUNTER — MYC REFILL (OUTPATIENT)
Dept: PEDIATRICS | Facility: CLINIC | Age: 52
End: 2017-06-23

## 2017-06-23 DIAGNOSIS — M43.22 FUSION OF SPINE OF CERVICAL REGION: ICD-10-CM

## 2017-06-23 DIAGNOSIS — G89.29 CHRONIC NECK PAIN: ICD-10-CM

## 2017-06-23 DIAGNOSIS — M54.2 CHRONIC NECK PAIN: ICD-10-CM

## 2017-06-23 DIAGNOSIS — Z79.891 LONG TERM CURRENT USE OF OPIATE ANALGESIC: ICD-10-CM

## 2017-06-23 RX ORDER — HYDROCODONE BITARTRATE AND ACETAMINOPHEN 10; 325 MG/1; MG/1
1 TABLET ORAL EVERY 6 HOURS PRN
Qty: 120 TABLET | Refills: 0 | Status: SHIPPED | OUTPATIENT
Start: 2017-06-23 | End: 2017-07-21

## 2017-06-23 RX ORDER — FENTANYL 100 UG/1
1 PATCH TRANSDERMAL
Qty: 15 PATCH | Refills: 0 | Status: SHIPPED | OUTPATIENT
Start: 2017-06-23 | End: 2017-07-21

## 2017-06-23 NOTE — TELEPHONE ENCOUNTER
Routing refill request to provider for review/approval because:  Drug not on the [facility] refill protocol      Disp Refills Start End TAMMY      fentaNYL (DURAGESIC) 100 mcg/hr 72 hr patch 15 patch 0 5/24/2017  No     Sig: Place 1 patch onto the skin every 48 hours     HYDROcodone-acetaminophen (NORCO)  MG per tablet 120 tablet 0 5/24/2017  No      Sig: Take 1 tablet by mouth every 6 hours as needed for pain         Last office visit: 4/6/17    Carla Lan RN,   [facility]

## 2017-06-23 NOTE — TELEPHONE ENCOUNTER
Message from MyChart:  Original authorizing provider: Maki Topete MD, MD Jaimee Rodriguez would like a refill of the following medications:  fentaNYL (DURAGESIC) 100 mcg/hr 72 hr patch [Maki Topete MD, MD]  HYDROcodone-acetaminophen (NORCO)  MG per tablet [Maki Topete MD, MD]    Preferred pharmacy: [facility] [address], [address]    Comment:  Please give the script to the [place] pharmacy.

## 2017-07-21 ENCOUNTER — MYC REFILL (OUTPATIENT)
Dept: PEDIATRICS | Facility: CLINIC | Age: 52
End: 2017-07-21

## 2017-07-21 DIAGNOSIS — M54.2 CHRONIC NECK PAIN: ICD-10-CM

## 2017-07-21 DIAGNOSIS — G89.29 CHRONIC NECK PAIN: ICD-10-CM

## 2017-07-21 DIAGNOSIS — Z79.891 LONG TERM CURRENT USE OF OPIATE ANALGESIC: ICD-10-CM

## 2017-07-21 DIAGNOSIS — M43.22 FUSION OF SPINE OF CERVICAL REGION: ICD-10-CM

## 2017-07-21 RX ORDER — FENTANYL 100 UG/1
1 PATCH TRANSDERMAL
Qty: 15 PATCH | Refills: 0 | Status: SHIPPED | OUTPATIENT
Start: 2017-07-26 | End: 2017-08-23

## 2017-07-21 RX ORDER — HYDROCODONE BITARTRATE AND ACETAMINOPHEN 10; 325 MG/1; MG/1
1 TABLET ORAL EVERY 6 HOURS PRN
Qty: 120 TABLET | Refills: 0 | Status: SHIPPED | OUTPATIENT
Start: 2017-07-26 | End: 2017-08-23

## 2017-07-21 NOTE — TELEPHONE ENCOUNTER
Last refilled 6/26/17. Approved for refill for 7/26/17.   Please inform patient, refill/prescriptioni approved. Please drop off prescription at pharmacy.

## 2017-07-21 NOTE — TELEPHONE ENCOUNTER
Message from MyChart:  Original authorizing provider: Maki Topete MD, MD Jaimee Rodriguez would like a refill of the following medications:  fentaNYL (DURAGESIC) 100 mcg/hr 72 hr patch [Maki Topete MD, MD]  HYDROcodone-acetaminophen (NORCO)  MG per tablet [Maki Topete MD, MD]    Preferred pharmacy: [facility] [address], [address]    Comment:

## 2017-07-21 NOTE — TELEPHONE ENCOUNTER
HYDROcodone-acetaminophen (NORCO)  MG per tablet      Last Written Prescription Date:  06/23/17  Last Fill Quantity: 120,   # refills: 0  Last Office Visit with [facility] or [facility] prescribing provider: 04/06/17.    Future Office visit:    Next 5 appointments (look out 90 days)     Aug 03, 2017 10:00 AM CDT   Office Visit with PFT LAB   [facility] ([facility])    [address] [facility] [place] [address]   [phone]            Aug 03, 2017 11:00 AM CDT   Return Visit with David Morris Perlman, MD   [facility] ([facility])    [address] [place] [address]   [phone]                   Routing refill request to provider for review/approval because:  Drug not on the [facility], [facility] or [facility] refill protocol or controlled substance    fentaNYL (DURAGESIC) 100 mcg/hr 72 hr patch      Last Written Prescription Date:  06/23/17  Last Fill Quantity: 15 patches,   # refills: 0  Last Office Visit with [facility], [facility] or [facility] prescribing provider: 04/06/17.    Future Office visit:    Next 5 appointments (look out 90 days)     Aug 03, 2017 10:00 AM CDT   Office Visit with PFT LAB   [facility] ([facility])    [address] [facility] [place] [address]   [phone]            Aug 03, 2017 11:00 AM CDT   Return Visit with David Morris Perlman, MD   [facility])    [address] [place] [address]   [phone]                   Routing refill request to provider for review/approval because:  Drug not on the [facility], [facility] or [facility] refill protocol or controlled substance

## 2017-08-02 ENCOUNTER — RADIANT APPOINTMENT (OUTPATIENT)
Dept: CT IMAGING | Facility: CLINIC | Age: 52
End: 2017-08-02
Attending: INTERNAL MEDICINE
Payer: COMMERCIAL

## 2017-08-02 DIAGNOSIS — R91.8 PULMONARY NODULES: ICD-10-CM

## 2017-08-02 PROCEDURE — 71250 CT THORAX DX C-: CPT | Performed by: RADIOLOGY

## 2017-08-02 NOTE — PROGRESS NOTES
Abhay Hermosillo,  Your chest CT showed few new right lung nodules with no changes with the pre-existing nodules.  This needs a follow-up CT to ensure stability in 3-6 months.   Let me know if you have any questions. Take care.  Carlyn Helm MD  ( covering for Dr. Topete in her absence)

## 2017-08-03 ENCOUNTER — OFFICE VISIT (OUTPATIENT)
Dept: PULMONOLOGY | Facility: CLINIC | Age: 52
End: 2017-08-03
Payer: COMMERCIAL

## 2017-08-03 ENCOUNTER — OFFICE VISIT (OUTPATIENT)
Dept: NURSING | Facility: CLINIC | Age: 52
End: 2017-08-03
Payer: COMMERCIAL

## 2017-08-03 VITALS
HEIGHT: 63 IN | BODY MASS INDEX: 33.15 KG/M2 | OXYGEN SATURATION: 92 % | DIASTOLIC BLOOD PRESSURE: 85 MMHG | HEART RATE: 76 BPM | SYSTOLIC BLOOD PRESSURE: 137 MMHG | WEIGHT: 187.1 LBS

## 2017-08-03 DIAGNOSIS — J44.9 COPD, SEVERE (H): ICD-10-CM

## 2017-08-03 DIAGNOSIS — J44.9 COPD (CHRONIC OBSTRUCTIVE PULMONARY DISEASE) (H): Primary | ICD-10-CM

## 2017-08-03 DIAGNOSIS — J44.1 OBSTRUCTIVE CHRONIC BRONCHITIS WITH EXACERBATION (H): ICD-10-CM

## 2017-08-03 DIAGNOSIS — J43.2 CENTRILOBULAR EMPHYSEMA (H): Primary | ICD-10-CM

## 2017-08-03 DIAGNOSIS — R91.8 PULMONARY NODULES: ICD-10-CM

## 2017-08-03 LAB
6 MIN WALK (FT): 1025 FT
6 MIN WALK (M): 312 M
DLCOUNC-%PRED-PRE: 57 %
DLCOUNC-PRE: 12.55 ML/MIN/MMHG
DLCOUNC-PRED: 21.78 ML/MIN/MMHG
ERV-%PRED-PRE: 123 %
ERV-PRE: 0.62 L
ERV-PRED: 0.5 L
EXPTIME-PRE: 7.09 SEC
FEF2575-%PRED-PRE: 19 %
FEF2575-PRE: 0.5 L/SEC
FEF2575-PRED: 2.5 L/SEC
FEFMAX-%PRED-PRE: 67 %
FEFMAX-PRE: 4.33 L/SEC
FEFMAX-PRED: 6.39 L/SEC
FEV1-%PRED-PRE: 50 %
FEV1-PRE: 1.29 L
FEV1FEV6-PRE: 54 %
FEV1FEV6-PRED: 82 %
FEV1FVC-PRE: 52 %
FEV1FVC-PRED: 80 %
FEV1SVC-PRE: 45 %
FEV1SVC-PRED: 79 %
FIFMAX-PRE: 5.41 L/SEC
FVC-%PRED-PRE: 77 %
FVC-PRE: 2.47 L
FVC-PRED: 3.19 L
IC-%PRED-PRE: 83 %
IC-PRE: 2.27 L
IC-PRED: 2.73 L
VA-%PRED-PRE: 90 %
VA-PRE: 4.39 L
VC-%PRED-PRE: 89 %
VC-PRE: 2.89 L
VC-PRED: 3.23 L

## 2017-08-03 PROCEDURE — 94729 DIFFUSING CAPACITY: CPT | Performed by: INTERNAL MEDICINE

## 2017-08-03 PROCEDURE — 94375 RESPIRATORY FLOW VOLUME LOOP: CPT | Performed by: INTERNAL MEDICINE

## 2017-08-03 PROCEDURE — 99213 OFFICE O/P EST LOW 20 MIN: CPT | Mod: 25 | Performed by: INTERNAL MEDICINE

## 2017-08-03 PROCEDURE — 94620 HC PULMONARY STRESS TEST, SIMPLE: CPT | Performed by: INTERNAL MEDICINE

## 2017-08-03 RX ORDER — ALBUTEROL SULFATE 90 UG/1
2 AEROSOL, METERED RESPIRATORY (INHALATION) EVERY 6 HOURS PRN
Qty: 1 INHALER | Refills: 11 | Status: SHIPPED | OUTPATIENT
Start: 2017-08-03 | End: 2018-06-25

## 2017-08-03 NOTE — PROGRESS NOTES
PFT Note: Completed FVC and DLCO per Dr. Perlman.    6 Minute Walk Note: Completed 6 min walk. Started patient on RA @ rest with a SpO2 of 92%. Started walk on RA and patient was able to walk for 2 min before her SpO2 went below 88%. Rested patient and started walk again on 2l/m continuous O2. Patient was able to tolerate her entire 6 minute walk without resting walking at a steady pace per Dr. Perlman.

## 2017-08-03 NOTE — MR AVS SNAPSHOT
After Visit Summary   8/3/2017    Jaimee Rodriguez    MRN: [id number]           Patient Information     Date Of Birth          1965        Visit Information        Provider Department      8/3/2017 10:00 AM PFT LAB [facility]        Today's Diagnoses     COPD (chronic obstructive pulmonary disease) (H)    -  1       Follow-ups after your visit        Your next 10 appointments already scheduled     Aug 03, 2017 11:00 AM CDT   Return Visit with David Morris Perlman, MD   [facility] ([facility])    [address] [place] [address] [phone]              Who to contact     If you have questions or need follow up information about today's clinic visit or your schedule please contact [facility] directly at [phone].  Normal or non-critical lab and imaging results will be communicated to you by [employer] Mark[employer], letter or phone within 4 business days after the clinic has received the results. If you do not hear from us within 7 days, please contact the clinic through [employer] or phone. If you have a critical or abnormal lab result, we will notify you by phone as soon as possible.  Submit refill requests through Hobby[employer] or call your pharmacy and they will forward the refill request to us. Please allow 3 business days for your refill to be completed.          Additional Information About Your Visit        [employer] Information     HobbyTalk gives you secure access to your electronic health record. If you see a primary care provider, you can also send messages to your care team and make appointments. If you have questions, please call your primary care clinic.  If you do not have a primary care provider, please call [phone] and they will assist you.      HobbyTalk is an electronic gateway that provides easy, online access to your medical records. With HobbyTalk, you can request a clinic appointment, read your test results, renew a  prescription or communicate with your care team.     To access your existing account, please contact your [facility] or call [phone] for assistance.        Care EveryWhere ID     This is your Care EveryWhere ID. This could be used by other organizations to access your [place] medical records  [id number]         Blood Pressure from Last 3 Encounters:   04/06/17 (!) 143/93   02/02/17 143/87   12/19/16 (!) 167/102    Weight from Last 3 Encounters:   04/06/17 91.4 kg (201 lb 6.4 oz)   02/02/17 87.5 kg (193 lb)   12/19/16 88 kg (193 lb 14.4 oz)              We Performed the Following     6 minute walk test     General PFT Lab (Please always keep checked)     HC DIFFUSING CAPACITY     PULMONARY STRESS TEST, SIMPLE     RESPIRATORY FLOW VOLUME LOOP        Primary Care Provider Office Phone # Fax #    Maki Topete [phone]       [facility] [address]  [facility] [address]        Equal Access to Services     [facility] : Hadii aad ku hadasho Soomaali, waaxda luqadaha, qaybta kaalmada adeegyada, waxay idiin haytammyn rené lay . So [facility] [phone].    ATENCIÓN: Si habla español, tiene a pepe disposición servicios gratuitos de asistencia lingüística. Llame al [phone].    We comply with applicable federal civil rights laws and [place] laws. We do not discriminate on the basis of race, color, national origin, age, disability sex, sexual orientation or gender identity.            Thank you!     Thank you for choosing [facility]  for your care. Our goal is always to provide you with excellent care. Hearing back from our patients is one way we can continue to improve our services. Please take a few minutes to complete the written survey that you may receive in the mail after your visit with us. Thank you!             Your Updated Medication List - Protect others around you: Learn how to safely use, store and throw away your  medicines at www.disposemymeds.org.          This list is accurate as of: 8/3/17 10:50 AM.  Always use your most recent med list.                   Brand Name Dispense Instructions for use Diagnosis    albuterol 108 (90 BASE) MCG/ACT Inhaler    PROAIR HFA/PROVENTIL HFA/VENTOLIN HFA    1 Inhaler    Inhale 2 puffs into the lungs every 6 hours as needed for shortness of breath / dyspnea or wheezing    Obstructive chronic bronchitis with exacerbation (H)       eszopiclone 2 MG tablet    LUNESTA    30 tablet    Take 1 tablet (2 mg) by mouth At Bedtime    Chronic insomnia       fentaNYL 100 mcg/hr 72 hr patch    DURAGESIC    15 patch    Place 1 patch onto the skin every 48 hours    Chronic neck pain, Fusion of spine of cervical region, Long term current use of opiate analgesic       HYDROcodone-acetaminophen  MG per tablet    NORCO    120 tablet    Take 1 tablet by mouth every 6 hours as needed for pain    Chronic neck pain, Fusion of spine of cervical region, Long term current use of opiate analgesic       methyl salicylate-menthol Oint     1 Tube    Apply 5 g topically every 6 hours as needed.    Chronic neck pain       mometasone-formoterol 200-5 MCG/ACT oral inhaler    DULERA    1 Inhaler    Inhale 2 puffs into the lungs 2 times daily    COPD, severe (H)       * order for DME     1 Box    Pain relieving creams, lotions, gels or patches ( containing Menthol) for occasional use for pain    Chronic neck pain       * order for DME     1 Units    Equipment being ordered: Optichamber    Obstructive chronic bronchitis with exacerbation (H)       * order for DME     1 Device    Equipment being ordered: super feet size C    Plantar fasciitis       * order for DME     1 Device    Equipment being ordered: Oxygen 4 lpm via nasal cannula continuous flow with any exertion and 2 lpm continuous flow with nasal cannula at rest and at night. Provide portability.  Length of need 99.    COPD (chronic obstructive pulmonary disease)  (H), Dyspnea       * order for DME     1 Device    Equipment being ordered: Superfeet - Blue - Size C    Plantar fasciitis       polyethylene glycol powder    MIRALAX    510 g    Take 17 g (1 capful) by mouth daily as needed for constipation    Drug-induced constipation       tiotropium 2.5 MCG/ACT inhalation aerosol    SPIRIVA RESPIMAT    12 g    Inhale 2 puffs into the lungs daily    COPD, severe (H)       tiZANidine 4 MG tablet    ZANAFLEX    90 tablet    TAKE 2 TABLETS(8 MG) BY MOUTH THREE TIMES DAILY    Chronic neck pain, Muscle spasticity       traZODone 50 MG tablet    DESYREL    90 tablet    Take 1-2 tablets ( mg) by mouth At Bedtime AT BEDTIME FOR SLEEP    Chronic insomnia       zolpidem 5 MG tablet    AMBIEN    30 tablet    Take 1 tablet (5 mg) by mouth nightly as needed for sleep    Chronic insomnia       * Notice:  This list has 5 medication(s) that are the same as other medications prescribed for you. Read the directions carefully, and ask your doctor or other care provider to review them with you.

## 2017-08-03 NOTE — MR AVS SNAPSHOT
After Visit Summary   8/3/2017    Jaimee Rodriguez    MRN: [id number]           Patient Information     Date Of Birth          1965        Visit Information        Provider Department      8/3/2017 11:00 AM Perlman, David Morris, MD [facility]        Today's Diagnoses     Centrilobular emphysema (H)    -  1    Pulmonary nodules        COPD, severe (H)        COPD, severe        Obstructive chronic bronchitis with exacerbation (H)           Follow-ups after your visit        Your next 10 appointments already scheduled     Feb 20, 2018  1:00 PM CST   CT CHEST W/O CONTRAST with MGCT1   [facility])    [address] [place] [address] [phone]           Please bring any scans or X-rays taken at other hospitals, if similar tests were done. Also bring a list of your medicines, including vitamins, minerals and over-the-counter drugs. It is safest to leave personal items at home.  Be sure to tell your doctor:   If you have any allergies.   If there s any chance you are pregnant.   If you are breastfeeding.   If you have any special needs.  You do not need to do anything special to prepare.  Please wear loose clothing, such as a sweat suit or jogging clothes. Avoid snaps, zippers and other metal. We may ask you to undress and put on a hospital gown.            Feb 22, 2018  9:00 AM CST   Office Visit with PFT LAB   [facility])    [address] [place] [id number] [phone]           Bring a current list of meds and any records pertaining to this visit. For Physicals, please bring immunization records and any forms needing to be filled out. Please arrive 10 minutes early to complete paperwork.            Feb 22, 2018 10:00 AM CST   Return Visit with David Morris Perlman, MD   [facility])    [address] [place] [address]  N  [facility] [address]   [phone]              Future tests that were ordered for you today     Open Future Orders        Priority Expected Expires Ordered    CT Chest w/o Contrast Routine  8/3/2018 8/3/2017    General PFT Lab (Please always keep checked) Routine  8/3/2018 8/3/2017    Pulmonary Function Test Routine  8/3/2018 8/3/2017            Who to contact     If you have questions or need follow up information about today's clinic visit or your schedule please contact [email or web address] directly at [phone].  Normal or non-critical lab and imaging results will be communicated to you by Forcur[employer]art, letter or phone within 4 business days after the clinic has received the results. If you do not hear from us within 7 days, please contact the clinic through Zipmarkt or phone. If you have a critical or abnormal lab result, we will notify you by phone as soon as possible.  Submit refill requests through [employer] Orders or call your pharmacy and they will forward the refill request to us. Please allow 3 business days for your refill to be completed.          Additional Information About Your Visit        For[employer]art Information     [employer] gives you secure access to your electronic health record. If you see a primary care provider, you can also send messages to your care team and make appointments. If you have questions, please call your primary care clinic.  If you do not have a primary care provider, please call [phone] and they will assist you.      [employer] Orders is an electronic gateway that provides easy, online access to your medical records. With [employer], you can request a clinic appointment, read your test results, renew a prescription or communicate with your care team.     To access your existing account, please contact your [facility] or call [phone] for assistance.        Care EveryWhere ID     This is your Care EveryWhere ID. This could be used by other  "organizations to access your [place] medical records  [id number]        Your Vitals Were     Pulse Height Pulse Oximetry BMI (Body Mass Index)          76 1.588 m (5' 2.5\") 92% 33.68 kg/m2         Blood Pressure from Last 3 Encounters:   08/03/17 137/85   04/06/17 (!) 143/93   02/02/17 143/87    Weight from Last 3 Encounters:   08/03/17 84.9 kg (187 lb 1.6 oz)   04/06/17 91.4 kg (201 lb 6.4 oz)   02/02/17 87.5 kg (193 lb)                 Where to get your medicines      These medications were sent to [facility] [address] [facility] [address] [facility] [address]  [address] [address] [address]     Phone:  [phone]     albuterol 108 (90 BASE) MCG/ACT Inhaler    tiotropium 2.5 MCG/ACT inhalation aerosol         Call your pharmacy to confirm that your medication is ready for pickup. It may take up to 24 hours for them to receive the prescription. If the prescription is not ready within 3 business days, please contact your clinic or your provider.     We will let you know when these medications are ready. If you don't hear back within 3 business days, please contact us.     mometasone-formoterol 200-5 MCG/ACT oral inhaler          Primary Care Provider Office Phone # Fax #    Maki Topete -[phone]       [facility] [address]  [facility] [address]        Equal Access to Services     [facility] : Hadii aad ku hadasho Soomaali, waaxda luqadaha, qaybta kaalmada adeegyada, morris cameron. So [facility] [phone].    ATENCIÓN: Si habla español, tiene a pepe disposición servicios gratuitos de asistencia lingüística. Llame al [phone].    We comply with applicable federal civil rights laws and [place] laws. We do not discriminate on the basis of race, color, national origin, age, disability sex, sexual orientation or gender identity.            Thank you!     Thank you for choosing [facility]  for your care. Our goal is " always to provide you with excellent care. Hearing back from our patients is one way we can continue to improve our services. Please take a few minutes to complete the written survey that you may receive in the mail after your visit with us. Thank you!             Your Updated Medication List - Protect others around you: Learn how to safely use, store and throw away your medicines at www.disposemymeds.org.          This list is accurate as of: 8/3/17 11:14 AM.  Always use your most recent med list.                   Brand Name Dispense Instructions for use Diagnosis    albuterol 108 (90 BASE) MCG/ACT Inhaler    PROAIR HFA/PROVENTIL HFA/VENTOLIN HFA    1 Inhaler    Inhale 2 puffs into the lungs every 6 hours as needed for shortness of breath / dyspnea or wheezing    Obstructive chronic bronchitis with exacerbation (H)       eszopiclone 2 MG tablet    LUNESTA    30 tablet    Take 1 tablet (2 mg) by mouth At Bedtime    Chronic insomnia       fentaNYL 100 mcg/hr 72 hr patch    DURAGESIC    15 patch    Place 1 patch onto the skin every 48 hours    Chronic neck pain, Fusion of spine of cervical region, Long term current use of opiate analgesic       HYDROcodone-acetaminophen  MG per tablet    NORCO    120 tablet    Take 1 tablet by mouth every 6 hours as needed for pain    Chronic neck pain, Fusion of spine of cervical region, Long term current use of opiate analgesic       mometasone-formoterol 200-5 MCG/ACT oral inhaler    DULERA    1 Inhaler    Inhale 2 puffs into the lungs 2 times daily    COPD, severe (H)       order for DME     1 Box    Pain relieving creams, lotions, gels or patches ( containing Menthol) for occasional use for pain    Chronic neck pain       * order for DME     1 Device    Equipment being ordered: Oxygen 4 lpm via nasal cannula continuous flow with any exertion and 2 lpm continuous flow with nasal cannula at rest and at night. Provide portability.  Length of need 99.    COPD (chronic  obstructive pulmonary disease) (H), Dyspnea       * order for DME     1 Device    Equipment being ordered: Superfeet - Blue - Size C    Plantar fasciitis       polyethylene glycol powder    MIRALAX    510 g    Take 17 g (1 capful) by mouth daily as needed for constipation    Drug-induced constipation       tiotropium 2.5 MCG/ACT inhalation aerosol    SPIRIVA RESPIMAT    12 g    Inhale 2 puffs into the lungs daily    COPD, severe (H)       tiZANidine 4 MG tablet    ZANAFLEX    90 tablet    TAKE 2 TABLETS(8 MG) BY MOUTH THREE TIMES DAILY    Chronic neck pain, Muscle spasticity       traZODone 50 MG tablet    DESYREL    90 tablet    Take 1-2 tablets ( mg) by mouth At Bedtime AT BEDTIME FOR SLEEP    Chronic insomnia       zolpidem 5 MG tablet    AMBIEN    30 tablet    Take 1 tablet (5 mg) by mouth nightly as needed for sleep    Chronic insomnia       * Notice:  This list has 2 medication(s) that are the same as other medications prescribed for you. Read the directions carefully, and ask your doctor or other care provider to review them with you.

## 2017-08-03 NOTE — PROGRESS NOTES
Reason for Visit  Jaimee Rodriguez is a 52 year old year old female who is being seen for COPD    ILD HPI    Jaimee Rodriguez is a 52-year-old female who follows up today for COPD. Overall the patient states she is doing relatively well she feels her breathing is stable. She uses her albuterol rescue inhaler at least 3-4 times a week. She does have oxygen which she uses 2 L with significant exertion. She continues on her Dulera and her Spiriva as well. She recently has switched her insurance to Medicare so she is having a little more difficulty paying for her medications. Otherwise though she feels she is doing well without any other new complaints today denies significant change in her a mild to moderate dyspnea on exertion.        Current Outpatient Prescriptions   Medication     tiZANidine (ZANAFLEX) 4 MG tablet     fentaNYL (DURAGESIC) 100 mcg/hr 72 hr patch     HYDROcodone-acetaminophen (NORCO)  MG per tablet     eszopiclone (LUNESTA) 2 MG tablet     zolpidem (AMBIEN) 5 MG tablet     mometasone-formoterol (DULERA) 200-5 MCG/ACT oral inhaler     tiotropium (SPIRIVA RESPIMAT) 2.5 MCG/ACT inhalation aerosol     albuterol (PROAIR HFA/PROVENTIL HFA/VENTOLIN HFA) 108 (90 BASE) MCG/ACT Inhaler     traZODone (DESYREL) 50 MG tablet     polyethylene glycol (MIRALAX) powder     order for DME     ORDER FOR DME     ORDER FOR DME     [DISCONTINUED] fluticasone-salmeterol (ADVAIR) 500-50 MCG/DOSE diskus inhaler     [DISCONTINUED] ORDER FOR DME     No current facility-administered medications for this visit.      Allergies   Allergen Reactions     Morphine      Past Medical History:   Diagnosis Date     Cervical spondylosis without myelopathy     With radiculopathy.     Contact dermatitis and other eczema, due to unspecified cause     eczema     Endometriosis, site unspecified     R uterosacral ligament/surgically removed     Irregular menstrual cycle      NONSPECIFIC MEDICAL HISTORY     Mood swings, Irritability     Other  acne      Other and unspecified ovarian cyst     sydni R     Tobacco use disorder 2001       Past Surgical History:   Procedure Laterality Date     C APPENDECTOMY  1988     C  DELIVERY ONLY  ,,    , Low Cervical     C LIGATE FALLOPIAN TUBE      S/P tubal ligation     C TOTAL ABDOM HYSTERECTOMY      not total     pt did not have ovaries removed     HC ENLARGE BREAST WITH IMPLANT       SURGICAL HISTORY OF -   10/18/2005    C6 corpectomy with iliac crest autograft fixation. [facility]       Social History     Social History     Marital status:      Spouse name: N/A     Number of children: N/A     Years of education: N/A     Occupational History     Not on file.     Social History Main Topics     Smoking status: Current Every Day Smoker     Packs/day: 0.75     Years: 35.00     Types: Cigarettes     Smokeless tobacco: Never Used      Comment: started age 14.     Alcohol use 0.0 oz/week     0 Standard drinks or equivalent per week      Comment: occ     Drug use: No     Sexual activity: Yes     Partners: Male     Birth control/ protection: Surgical     Other Topics Concern      Service No     Blood Transfusions No     Caffeine Concern No     Occupational Exposure No     Hobby Hazards No     Sleep Concern Yes     Stress Concern No     Weight Concern No     Special Diet No     not a milk drinker     Back Care No     Exercise No     walks at work     Seat Belt Yes     Self-Exams No     Social History Narrative       Family History   Problem Relation Age of Onset     CEREBROVASCULAR DISEASE Mother      Chronic Obstructive Pulmonary Disease Mother      Arthritis Father      DIABETES Maternal Grandmother      CANCER Maternal Aunt      breast       ROS Pulmonary    A complete ROS was otherwise negative except as noted in the HPI.  Vitals:    17 1049   BP: 137/85   BP Location: Left arm   Patient Position: Chair   Cuff Size: Adult Regular   Pulse: 76   SpO2:  "92%   Weight: 84.9 kg (187 lb 1.6 oz)   Height: 1.588 m (5' 2.5\")     Exam:   GENERAL APPEARANCE: Well developed, well nourished, alert, and in no apparent distress.  NECK: supple, no masses, no thyromegaly.  LYMPHATICS: No significant axillary, cervical, or supraclavicular nodes.  RESP: decreased BS mainly in upper lung fields, no wheezes.  CV: Normal S1, S2, regular rhythm, normal rate, no rub, no murmur,  no gallop, no LE edema.   ABDOMEN:  Bowel sounds normal, soft, nontender, no HSM or masses.   MS: extremities normal- no clubbing, no cyanosis.  NEURO: Mentation intact, speech normal, normal strength and tone, normal gait and stance  PSYCH: mentation appears normal. and affect normal/bright  Results: I have reviewed all imaging, PFTs and other relavent tests, please see below for details, PFT and imaging results were reviewed with the patient.  PFTs: Moderate obstruction, stable.  Assessment and plan:     52-year-old female with moderate COPD currently well-controlled on her current medications. Will not make any changes at this time she will continue to use her inhalers as she has prescribed. I will plan to see her back in 6 months with vomiting. She will call sooner with any changes in her breathing.    CBC   Recent Labs   Lab Test  12/19/16   0855  09/26/13   1422   RBC  4.61  4.45   HGB  13.8  13.5   HCT  41.5  39.5   PLT  280  409       Basic Metabolic Panel  Recent Labs   Lab Test  12/19/16   0855  02/04/15   1201   NA  141  140   POTASSIUM  4.4  4.1   CHLORIDE  104  103   CO2  30  33*   BUN  13  20   GLC  100*  94   JOSEPH  9.0  9.1       INR  No results for input(s): INR in the last 64560 hours.    PFT  PFT Latest Ref Rng & Units 8/3/2017   FVC L 2.47   FEV1 L 1.29   FVC% % 77   FEV1% % 50           CC:        "

## 2017-08-03 NOTE — NURSING NOTE
"Jaimee Rodriguez's goals for this visit include: COPD  She requests these members of her care team be copied on today's visit information: yes    PCP: Maki Topete    Referring Provider:  No referring provider defined for this encounter.    Chief Complaint   Patient presents with     COPD       Initial /85 (BP Location: Left arm, Patient Position: Chair, Cuff Size: Adult Regular)  Pulse 76  Ht 1.588 m (5' 2.5\")  Wt 84.9 kg (187 lb 1.6 oz)  SpO2 92%  BMI 33.68 kg/m2 Estimated body mass index is 33.68 kg/(m^2) as calculated from the following:    Height as of this encounter: 1.588 m (5' 2.5\").    Weight as of this encounter: 84.9 kg (187 lb 1.6 oz).  Medication Reconciliation: complete    Do you need any medication refills at today's visit? no    "

## 2017-08-23 ENCOUNTER — MYC REFILL (OUTPATIENT)
Dept: PEDIATRICS | Facility: CLINIC | Age: 52
End: 2017-08-23

## 2017-08-23 DIAGNOSIS — F51.04 CHRONIC INSOMNIA: ICD-10-CM

## 2017-08-23 DIAGNOSIS — Z79.891 LONG TERM CURRENT USE OF OPIATE ANALGESIC: ICD-10-CM

## 2017-08-23 DIAGNOSIS — M43.22 FUSION OF SPINE OF CERVICAL REGION: ICD-10-CM

## 2017-08-23 DIAGNOSIS — G89.29 CHRONIC NECK PAIN: ICD-10-CM

## 2017-08-23 DIAGNOSIS — M54.2 CHRONIC NECK PAIN: ICD-10-CM

## 2017-08-23 RX ORDER — HYDROCODONE BITARTRATE AND ACETAMINOPHEN 10; 325 MG/1; MG/1
1 TABLET ORAL EVERY 6 HOURS PRN
Qty: 120 TABLET | Refills: 0 | Status: SHIPPED | OUTPATIENT
Start: 2017-08-23 | End: 2017-09-25

## 2017-08-23 RX ORDER — FENTANYL 100 UG/1
1 PATCH TRANSDERMAL
Qty: 15 PATCH | Refills: 0 | Status: SHIPPED | OUTPATIENT
Start: 2017-08-23 | End: 2017-09-25

## 2017-08-23 RX ORDER — TRAZODONE HYDROCHLORIDE 50 MG/1
50-100 TABLET, FILM COATED ORAL AT BEDTIME
Qty: 90 TABLET | Refills: 2 | Status: SHIPPED | OUTPATIENT
Start: 2017-08-23 | End: 2017-10-11

## 2017-08-23 NOTE — TELEPHONE ENCOUNTER
Message from [place]:  Original authorizing provider: ELLIE Shields CNP    Jaimee Rodriguez would like a refill of the following medications:  traZODone (DESYREL) 50 MG tablet [ELLIE Shields CNP]    Preferred pharmacy: [facility]ITT[facility] REQUESTED    Comment:  [facility] for the 3 scripts.    Medication renewals requested in this message routed to other providers:  fentaNYL (DURAGESIC) 100 mcg/hr 72 hr patch [Maki Topete MD, MD]  HYDROcodone-acetaminophen (NORCO)  MG per tablet [Maki Topete MD, MD]

## 2017-08-23 NOTE — TELEPHONE ENCOUNTER
traZODone (DESYREL) 50 MG tablet  Last Written Prescription Date: 11/16/2016  Last Fill Quantity: 90,  # refills: 1   Last Office Visit with [facility], [facility] or [facility] prescribing provider: 4/6/2017                                           Refilled trazodone per [facility] protocol.    Shanna Maria RN

## 2017-08-24 NOTE — TELEPHONE ENCOUNTER
My chart message sent to patient advising her the script for fentanyl is ready for  at the , check in #1.    Maribell Macias CMA

## 2017-09-25 ENCOUNTER — MYC REFILL (OUTPATIENT)
Dept: PEDIATRICS | Facility: CLINIC | Age: 52
End: 2017-09-25

## 2017-09-25 DIAGNOSIS — G89.29 CHRONIC NECK PAIN: ICD-10-CM

## 2017-09-25 DIAGNOSIS — M54.2 CHRONIC NECK PAIN: ICD-10-CM

## 2017-09-25 DIAGNOSIS — Z79.891 LONG TERM CURRENT USE OF OPIATE ANALGESIC: ICD-10-CM

## 2017-09-25 DIAGNOSIS — M43.22 FUSION OF SPINE OF CERVICAL REGION: ICD-10-CM

## 2017-09-25 NOTE — TELEPHONE ENCOUNTER
Message from MyChart:  Original authorizing provider: Maki Topete MD PhD    Jaimee FITZPATRICKIlda Jennifer would like a refill of the following medications:  fentaNYL (DURAGESIC) 100 mcg/hr 72 hr patch [Maki Topete MD PhD]  HYDROcodone-acetaminophen (NORCO)  MG per tablet [Maki Topete MD PhD]    Preferred pharmacy: [place] [address], [address]    Comment:  Please forward the script to the [place] pharmacy.

## 2017-09-25 NOTE — TELEPHONE ENCOUNTER
Routing refill request to provider for review/approval because:  Drug not on the [facility] refill protocol     fentaNYL (DURAGESIC) 100 mcg/hr 72 hr patch 15 patch 0 8/23/2017  No      Sig: Place 1 patch onto the skin every 48 hours     HYDROcodone-acetaminophen (NORCO)  MG per tablet 120 tablet 0 8/23/2017  No      Sig: Take 1 tablet by mouth every 6 hours as needed for pain     4/6/17 last office visit.    Carla Lan RN,   [facility]

## 2017-09-26 RX ORDER — HYDROCODONE BITARTRATE AND ACETAMINOPHEN 10; 325 MG/1; MG/1
1 TABLET ORAL EVERY 6 HOURS PRN
Qty: 120 TABLET | Refills: 0 | Status: SHIPPED | OUTPATIENT
Start: 2017-09-26 | End: 2017-10-24

## 2017-09-26 RX ORDER — FENTANYL 100 UG/1
1 PATCH TRANSDERMAL
Qty: 15 PATCH | Refills: 0 | Status: SHIPPED | OUTPATIENT
Start: 2017-09-26 | End: 2017-10-24

## 2017-09-27 NOTE — TELEPHONE ENCOUNTER
Norco script and fentaNYL script tubed to [facility] pharmacy.  Patient advised via my chart.    Maribell Macias CMA

## 2017-10-11 ENCOUNTER — MYC REFILL (OUTPATIENT)
Dept: PEDIATRICS | Facility: CLINIC | Age: 52
End: 2017-10-11

## 2017-10-11 DIAGNOSIS — F51.04 CHRONIC INSOMNIA: ICD-10-CM

## 2017-10-12 RX ORDER — TRAZODONE HYDROCHLORIDE 50 MG/1
50-100 TABLET, FILM COATED ORAL AT BEDTIME
Qty: 90 TABLET | Refills: 1 | Status: SHIPPED | OUTPATIENT
Start: 2017-10-12 | End: 2017-11-20

## 2017-10-12 NOTE — TELEPHONE ENCOUNTER
Message from Nandi Proteinshart:  Original authorizing provider: ELLIE Shields CNP would like a refill of the following medications:  traZODone (DESYREL) 50 MG tablet [ELLIE Shields CNP]    Preferred pharmacy: [facility] [address] [place], [place] [id number] [place] [address]    Comment:

## 2017-10-12 NOTE — TELEPHONE ENCOUNTER
traZODone (DESYREL) 50 MG tablet      Last Written Prescription Date: 8/23/2017  Last Fill Quantity: 90,  # refills: 2   Last Office Visit with [facility], [facility] or [facility] prescribing provider: 4/6/2017                                           Patient requesting this to be transferred to [facility] pharmacy. Shanna Maria RN

## 2017-10-24 ENCOUNTER — MYC REFILL (OUTPATIENT)
Dept: PEDIATRICS | Facility: CLINIC | Age: 52
End: 2017-10-24

## 2017-10-24 DIAGNOSIS — G89.29 CHRONIC NECK PAIN: ICD-10-CM

## 2017-10-24 DIAGNOSIS — M54.2 CHRONIC NECK PAIN: ICD-10-CM

## 2017-10-24 DIAGNOSIS — Z79.891 LONG TERM CURRENT USE OF OPIATE ANALGESIC: ICD-10-CM

## 2017-10-24 DIAGNOSIS — M43.22 FUSION OF SPINE OF CERVICAL REGION: ICD-10-CM

## 2017-10-26 RX ORDER — FENTANYL 100 UG/1
1 PATCH TRANSDERMAL
Qty: 15 PATCH | Refills: 0 | Status: SHIPPED | OUTPATIENT
Start: 2017-10-26 | End: 2017-11-20

## 2017-10-26 RX ORDER — HYDROCODONE BITARTRATE AND ACETAMINOPHEN 10; 325 MG/1; MG/1
1 TABLET ORAL EVERY 6 HOURS PRN
Qty: 120 TABLET | Refills: 0 | Status: SHIPPED | OUTPATIENT
Start: 2017-10-26 | End: 2017-11-20

## 2017-10-26 NOTE — TELEPHONE ENCOUNTER
Let pt know she is due for 6 months follow up. This is last refill until clinic visit. Please schedule.

## 2017-10-26 NOTE — TELEPHONE ENCOUNTER
Routing refill request to provider for review/approval because:  Drug not on the [facility] refill protocol     fentaNYL (DURAGESIC) 100 mcg/hr 72 hr patch     Last Written Prescription Date: 9/26/2017  Last Fill Quantity: 15 patch,  # refills: 0   Last Office Visit with [facility], [facility] or [facility] prescribing provider: 4/6/2017                                           HYDROcodone-acetaminophen (NORCO)  MG per tablet  Last Written Prescription Date: 9/26/2017  Last Fill Quantity: 120,  # refills: 0   Last Office Visit with [facility], [facility] or [facility] prescribing provider: 4/6/2017    Shanna Maria RN

## 2017-10-26 NOTE — TELEPHONE ENCOUNTER
Message from MyChart:  Original authorizing provider: Maki Topete MD PhD    Jaimee MIlda Velásquezcharlie would like a refill of the following medications:  fentaNYL (DURAGESIC) 100 mcg/hr 72 hr patch [Maki Topete MD PhD]  HYDROcodone-acetaminophen (NORCO)  MG per tablet [Maki Topete MD PhD]    Preferred pharmacy: [place] [address], [address]    Comment:

## 2017-10-27 ENCOUNTER — TELEPHONE (OUTPATIENT)
Dept: PULMONOLOGY | Facility: CLINIC | Age: 52
End: 2017-10-27

## 2017-10-27 DIAGNOSIS — K59.03 DRUG-INDUCED CONSTIPATION: ICD-10-CM

## 2017-10-27 RX ORDER — ZOLPIDEM TARTRATE 6.25 MG/1
TABLET, FILM COATED, EXTENDED RELEASE ORAL
Qty: 30 TABLET | Refills: 0 | Status: SHIPPED | OUTPATIENT
Start: 2017-10-27 | End: 2019-04-08

## 2017-10-27 RX ORDER — POLYETHYLENE GLYCOL 3350 17 G/17G
POWDER, FOR SOLUTION ORAL
Qty: 527 G | Refills: 0 | Status: SHIPPED | OUTPATIENT
Start: 2017-10-27 | End: 2017-12-13

## 2017-10-27 NOTE — TELEPHONE ENCOUNTER
PA Initiation    Medication: mometasone-formoterol (DULERA) 200-5 MCG/ACT oral inhaler  Insurance Company: [employer] - Phone [phone] Fax [phone]  Pharmacy Filling the Rx: [facility] [address] [place] [address]  Filling Pharmacy Phone: [phone]  Filling Pharmacy Fax: [phone]  Start Date: 10/27/2017

## 2017-10-27 NOTE — TELEPHONE ENCOUNTER
Prior Authorization Approval    Authorization Effective Date: 10/27/2017  Authorization Expiration Date: 12/31/1999  Medication: mometasone-formoterol (DULERA) 200-5 MCG/ACT oral inhaler- APPROVED  Approved Dose/Quantity:   Reference #:     Insurance Company: [employer] - Phone [phone] Fax [phone]  Expected CoPay: $3.70     CoPay Card Available:      Foundation Assistance Needed:    Which Pharmacy is filling the prescription (Not needed for infusion/clinic administered): [facility] [id number] [place] [address] [place] [address]  Pharmacy Notified: Yes  Patient Notified: Yes

## 2017-10-27 NOTE — TELEPHONE ENCOUNTER
Mirilax Medication filled per protocol.      Carla Lan RN, [facility]       ______________________________________________________________________    Routing refill request to provider for review/approval because:  Drug not on the [facility] refill protocol       zolpidem (AMBIEN) 5 MG tablet 30 tablet 3 3/2/2017  No      Sig: Take 1 tablet (5 mg) by mouth nightly as needed for sleep     Last office visit:  4/6/17      Carla Lan RN,   [facility]

## 2017-10-27 NOTE — TELEPHONE ENCOUNTER
Received Message:  Adela Mortensen  [facility] 15 minutes ago (3:39 PM)                 PA has been approved, pharmacy and patient have been notified.  If done with encounter, please close.     Thanks,   Adela (Routing comment)         Encounter closed.

## 2017-10-27 NOTE — TELEPHONE ENCOUNTER
PA Initiation    Medication: mometasone-formoterol (DULERA) 200-5 MCG/ACT oral inhaler  Insurance Company: [employer] - Phone [phone] Fax [phone]  Pharmacy Filling the Rx: [facility] [address] [place] [address]  Filling Pharmacy Phone: [phone]  Filling Pharmacy Fax: [phone]  Start Date: 10/27/2017

## 2017-10-30 ENCOUNTER — TELEPHONE (OUTPATIENT)
Dept: PEDIATRICS | Facility: CLINIC | Age: 52
End: 2017-10-30

## 2017-10-30 NOTE — TELEPHONE ENCOUNTER
[facility] Prior Authorization Team   Phone: [phone]  Fax: [phone]      PA Initiation    Medication: zolpidem (AMBIEN CR) 6.25 MG CR tablet  Insurance Company: [employer] - Phone [phone] Fax [phone]  Pharmacy Filling the Rx: [facility] [address] [place] [address]  Filling Pharmacy Phone: [phone]  Filling Pharmacy Fax:    Start Date: 10/30/2017

## 2017-10-30 NOTE — TELEPHONE ENCOUNTER
Prior Authorization Approval    Authorization Effective Date: 10/30/2017  Authorization Expiration Date: 12/31/1999  Medication: zolpidem (AMBIEN CR) 6.25 MG CR tablet-APPROVED  Approved Dose/Quantity:   Reference #:     Insurance Company: [employer] - Phone [phone] Fax [phone]  Expected CoPay: $1.20     CoPay Card Available:      Foundation Assistance Needed:    Which Pharmacy is filling the prescription (Not needed for infusion/clinic administered): [facility] [id number] [place] [address]  Pharmacy Notified: Yes  Patient Notified: Yes

## 2017-11-14 ENCOUNTER — MYC REFILL (OUTPATIENT)
Dept: PEDIATRICS | Facility: CLINIC | Age: 52
End: 2017-11-14

## 2017-11-14 DIAGNOSIS — M62.838 MUSCLE SPASTICITY: ICD-10-CM

## 2017-11-14 DIAGNOSIS — G89.29 CHRONIC NECK PAIN: ICD-10-CM

## 2017-11-14 DIAGNOSIS — M54.2 CHRONIC NECK PAIN: ICD-10-CM

## 2017-11-14 NOTE — TELEPHONE ENCOUNTER
tiZANidine (ZANAFLEX) 4 MG tablet      Last Written Prescription Date:  7/21/17  Last Fill Quantity: 90,   # refills: 5  Future Office visit:    Next 5 appointments (look out 90 days)     Nov 20, 2017 12:50 PM CST   [facility]    [address] [place] [id number] [phone]                   Routing refill request to provider for review/approval because:  Drug not on the [facility], [facility] or [facility] refill protocol or controlled substance

## 2017-11-14 NOTE — TELEPHONE ENCOUNTER
Message from TheTakeshart:  Original authorizing provider: Maki Topete MD PhD    Jaimee Rodriguez would like a refill of the following medications:  tiZANidine (ZANAFLEX) 4 MG tablet [Maki Topete MD PhD]    Preferred pharmacy: [facility] [address] [place], [place] [id number] [place] [address]    Comment:

## 2017-11-20 ENCOUNTER — OFFICE VISIT (OUTPATIENT)
Dept: PEDIATRICS | Facility: CLINIC | Age: 52
End: 2017-11-20
Payer: COMMERCIAL

## 2017-11-20 VITALS
HEART RATE: 79 BPM | TEMPERATURE: 97.9 F | OXYGEN SATURATION: 95 % | SYSTOLIC BLOOD PRESSURE: 142 MMHG | DIASTOLIC BLOOD PRESSURE: 86 MMHG | HEIGHT: 63 IN | WEIGHT: 186.9 LBS | BODY MASS INDEX: 33.12 KG/M2

## 2017-11-20 DIAGNOSIS — E55.9 VITAMIN D DEFICIENCY: ICD-10-CM

## 2017-11-20 DIAGNOSIS — F51.04 CHRONIC INSOMNIA: ICD-10-CM

## 2017-11-20 DIAGNOSIS — M43.22 FUSION OF SPINE OF CERVICAL REGION: ICD-10-CM

## 2017-11-20 DIAGNOSIS — Z51.81 ENCOUNTER FOR THERAPEUTIC DRUG MONITORING: ICD-10-CM

## 2017-11-20 DIAGNOSIS — G89.29 CHRONIC NECK PAIN: ICD-10-CM

## 2017-11-20 DIAGNOSIS — M54.2 CHRONIC NECK PAIN: ICD-10-CM

## 2017-11-20 DIAGNOSIS — Z79.891 LONG TERM CURRENT USE OF OPIATE ANALGESIC: ICD-10-CM

## 2017-11-20 DIAGNOSIS — E78.5 HYPERLIPIDEMIA LDL GOAL <130: ICD-10-CM

## 2017-11-20 DIAGNOSIS — R58 ECCHYMOSIS: Primary | ICD-10-CM

## 2017-11-20 DIAGNOSIS — Z13.21 ENCOUNTER FOR VITAMIN DEFICIENCY SCREENING: ICD-10-CM

## 2017-11-20 LAB
ERYTHROCYTE [DISTWIDTH] IN BLOOD BY AUTOMATED COUNT: 15.2 % (ref 10–15)
HCT VFR BLD AUTO: 40.6 % (ref 35–47)
HGB BLD-MCNC: 13.3 G/DL (ref 11.7–15.7)
MCH RBC QN AUTO: 30 PG (ref 26.5–33)
MCHC RBC AUTO-ENTMCNC: 32.8 G/DL (ref 31.5–36.5)
MCV RBC AUTO: 92 FL (ref 78–100)
PLATELET # BLD AUTO: 302 10E9/L (ref 150–450)
RBC # BLD AUTO: 4.43 10E12/L (ref 3.8–5.2)
WBC # BLD AUTO: 7.5 10E9/L (ref 4–11)

## 2017-11-20 PROCEDURE — 99000 SPECIMEN HANDLING OFFICE-LAB: CPT | Performed by: INTERNAL MEDICINE

## 2017-11-20 PROCEDURE — 80307 DRUG TEST PRSMV CHEM ANLYZR: CPT | Mod: 90 | Performed by: INTERNAL MEDICINE

## 2017-11-20 PROCEDURE — 85027 COMPLETE CBC AUTOMATED: CPT | Performed by: INTERNAL MEDICINE

## 2017-11-20 PROCEDURE — 99213 OFFICE O/P EST LOW 20 MIN: CPT | Performed by: INTERNAL MEDICINE

## 2017-11-20 PROCEDURE — 36415 COLL VENOUS BLD VENIPUNCTURE: CPT | Performed by: INTERNAL MEDICINE

## 2017-11-20 PROCEDURE — 82306 VITAMIN D 25 HYDROXY: CPT | Performed by: INTERNAL MEDICINE

## 2017-11-20 RX ORDER — HYDROCODONE BITARTRATE AND ACETAMINOPHEN 10; 325 MG/1; MG/1
1 TABLET ORAL EVERY 6 HOURS PRN
Qty: 120 TABLET | Refills: 0 | Status: SHIPPED | OUTPATIENT
Start: 2017-11-24 | End: 2017-12-21

## 2017-11-20 RX ORDER — FENTANYL 100 UG/1
1 PATCH TRANSDERMAL
Qty: 15 PATCH | Refills: 0 | Status: SHIPPED | OUTPATIENT
Start: 2017-11-24 | End: 2017-12-21

## 2017-11-20 RX ORDER — TRAZODONE HYDROCHLORIDE 100 MG/1
150-200 TABLET ORAL
Qty: 60 TABLET | Refills: 3 | Status: SHIPPED | OUTPATIENT
Start: 2017-11-20 | End: 2018-03-26

## 2017-11-20 NOTE — MR AVS SNAPSHOT
After Visit Summary   11/20/2017    Jaimee Rodriguez    MRN: [id number]           Patient Information     Date Of Birth          1965        Visit Information        Provider Department      11/20/2017 12:50 PM Maki Topete MD PhD [facility]        Today's Diagnoses     Ecchymosis    -  1    Chronic insomnia        Encounter for therapeutic drug monitoring        Chronic neck pain        Fusion of spine of cervical region        Long term current use of opiate analgesic        Hyperlipidemia LDL goal <130        Encounter for vitamin deficiency screening        Vitamin D deficiency           Care Instructions    Make appointment(s) for:   -- get labs today  -- chronic disease review in 6 months.   -- fasting lab for lipids.        Medication(s) prescribed today:    Orders Placed This Encounter   Medications     traZODone (DESYREL) 100 MG tablet     Sig: Take 1.5-2 tablets (150-200 mg) by mouth nightly as needed for sleep     Dispense:  60 tablet     Refill:  3     HYDROcodone-acetaminophen (NORCO)  MG per tablet     Sig: Take 1 tablet by mouth every 6 hours as needed for pain (next refill 12/26/17)     Dispense:  120 tablet     Refill:  0     fentaNYL (DURAGESIC) 100 mcg/hr 72 hr patch     Sig: Place 1 patch onto the skin every 48 hours (next refill 12/26/17)     Dispense:  15 patch     Refill:  0                   Follow-ups after your visit        Your next 10 appointments already scheduled     Feb 20, 2018  1:00 PM CST   CT CHEST W/O CONTRAST with MGCT[facility]   [facility]    [address] [place] [id number] [phone]           Please bring any scans or X-rays taken at other hospitals, if similar tests were done. Also bring a list of your medicines, including vitamins, minerals and over-the-counter drugs. It is safest to leave personal items at home.  Be sure to tell your doctor:   If you have any allergies.   If  there s any chance you are pregnant.   If you are breastfeeding.   If you have any special needs.  You do not need to do anything special to prepare.  Please wear loose clothing, such as a sweat suit or jogging clothes. Avoid snaps, zippers and other metal. We may ask you to undress and put on a hospital gown.            Feb 22, 2018  9:00 AM CST   [facility] Visit with PFT LAB   [facility]    [address] [place] [address] [phone]           Bring a current list of meds and any records pertaining to this visit. For Physicals, please bring immunization records and any forms needing to be filled out. Please arrive 10 minutes early to complete paperwork.            Feb 22, 2018 10:00 AM CST   Return Visit with David Morris Perlman, MD   [facility])    [address] [place] [id number] [phone]              Future tests that were ordered for you today     Open Future Orders        Priority Expected Expires Ordered    Lipid panel reflex to direct LDL Fasting Routine  11/20/2018 11/20/2017            Who to contact     If you have questions or need follow up information about today's clinic visit or your schedule please contact [facility] directly at [phone].  Normal or non-critical lab and imaging results will be communicated to you by MyChart, letter or phone within 4 business days after the clinic has received the results. If you do not hear from us within 7 days, please contact the clinic through MyChart or phone. If you have a critical or abnormal lab result, we will notify you by phone as soon as possible.  Submit refill requests through [employer] or call your pharmacy and they will forward the refill request to us. Please allow 3 business days for your refill to be completed.          Additional Information About Your Visit        MyChart Information     [employer]  "gives you secure access to your electronic health record. If you see a primary care provider, you can also send messages to your care team and make appointments. If you have questions, please call your primary care clinic.  If you do not have a primary care provider, please call [phone] and they will assist you.      [employer] is an electronic gateway that provides easy, online access to your medical records. With [employer], you can request a clinic appointment, read your test results, renew a prescription or communicate with your care team.     To access your existing account, please contact your [facility] or call [phone] for assistance.        Care EveryWhere ID     This is your Care EveryWhere ID. This could be used by other organizations to access your [place] medical records  [id number]        Your Vitals Were     Pulse Temperature Height Pulse Oximetry BMI (Body Mass Index)       79 97.9  F (36.6  C) (Temporal) 5' 2.5\" (1.588 m) 95% 33.64 kg/m2        Blood Pressure from Last 3 Encounters:   11/20/17 142/86   08/03/17 137/85   04/06/17 (!) 143/93    Weight from Last 3 Encounters:   11/20/17 186 lb 14.4 oz (84.8 kg)   08/03/17 187 lb 1.6 oz (84.9 kg)   04/06/17 201 lb 6.4 oz (91.4 kg)              We Performed the Following     CBC with platelets     Drug  Screen Comprehensive , Urine with Reported Meds (MedTox) (Pain Care Package)     Vitamin D Deficiency          Today's Medication Changes          These changes are accurate as of: 11/20/17  1:33 PM.  If you have any questions, ask your nurse or doctor.               These medicines have changed or have updated prescriptions.        Dose/Directions    fentaNYL 100 mcg/hr 72 hr patch   Commonly known as:  DURAGESIC   This may have changed:  additional instructions   Used for:  Chronic neck pain, Fusion of spine of cervical region, Long term current use of opiate analgesic   Changed by:  Maki Topete MD PhD        Dose:  1 " patch   Start taking on:  11/24/2017   Place 1 patch onto the skin every 48 hours (next refill 12/26/17)   Quantity:  15 patch   Refills:  0       HYDROcodone-acetaminophen  MG per tablet   Commonly known as:  NORCO   This may have changed:  additional instructions   Used for:  Chronic neck pain, Fusion of spine of cervical region, Long term current use of opiate analgesic   Changed by:  Maki Topete MD PhD        Dose:  1 tablet   Start taking on:  11/24/2017   Take 1 tablet by mouth every 6 hours as needed for pain (next refill 12/26/17)   Quantity:  120 tablet   Refills:  0       traZODone 100 MG tablet   Commonly known as:  DESYREL   This may have changed:    - medication strength  - how much to take  - when to take this  - reasons to take this  - additional instructions   Used for:  Chronic insomnia   Changed by:  Maki Topete MD PhD        Dose:  150-200 mg   Take 1.5-2 tablets (150-200 mg) by mouth nightly as needed for sleep   Quantity:  60 tablet   Refills:  3            Where to get your medicines      These medications were sent to [facility] [id number] [place] [id number]     Phone:  [phone]     traZODone 100 MG tablet         Some of these will need a paper prescription and others can be bought over the counter.  Ask your nurse if you have questions.     Bring a paper prescription for each of these medications     fentaNYL 100 mcg/hr 72 hr patch    HYDROcodone-acetaminophen  MG per tablet                Primary Care Provider Office Phone # Fax #    Maki Topete MD PhD [phone]       [address]  [facility] [address]        Equal Access to Services     [facility]: Hadii pat Christine, wakikada luqgabrielle, qaybta kaalmada mitul, morris cameron. So [facility] [phone].    ATENCIÓN: Si habla español, tiene a pepe disposición servicios gratuitos de asistencia  lingüística. Jose Juan al [phone].    We comply with applicable federal civil rights laws and [place] laws. We do not discriminate on the basis of race, color, national origin, age, disability, sex, sexual orientation, or gender identity.            Thank you!     Thank you for choosing [facility]  for your care. Our goal is always to provide you with excellent care. Hearing back from our patients is one way we can continue to improve our services. Please take a few minutes to complete the written survey that you may receive in the mail after your visit with us. Thank you!             Your Updated Medication List - Protect others around you: Learn how to safely use, store and throw away your medicines at www.disposemymeds.org.          This list is accurate as of: 11/20/17  1:33 PM.  Always use your most recent med list.                   Brand Name Dispense Instructions for use Diagnosis    albuterol 108 (90 BASE) MCG/ACT Inhaler    PROAIR HFA/PROVENTIL HFA/VENTOLIN HFA    1 Inhaler    Inhale 2 puffs into the lungs every 6 hours as needed for shortness of breath / dyspnea or wheezing    Obstructive chronic bronchitis with exacerbation (H)       fentaNYL 100 mcg/hr 72 hr patch   Start taking on:  11/24/2017    DURAGESIC    15 patch    Place 1 patch onto the skin every 48 hours (next refill 12/26/17)    Chronic neck pain, Fusion of spine of cervical region, Long term current use of opiate analgesic       HYDROcodone-acetaminophen  MG per tablet   Start taking on:  11/24/2017    NORCO    120 tablet    Take 1 tablet by mouth every 6 hours as needed for pain (next refill 12/26/17)    Chronic neck pain, Fusion of spine of cervical region, Long term current use of opiate analgesic       mometasone-formoterol 200-5 MCG/ACT oral inhaler    DULERA    1 Inhaler    Inhale 2 puffs into the lungs 2 times daily    COPD, severe (H)       * order for DME     1 Device    Equipment being ordered: Oxygen 4  lpm via nasal cannula continuous flow with any exertion and 2 lpm continuous flow with nasal cannula at rest and at night. Provide portability.  Length of need 99.    COPD (chronic obstructive pulmonary disease) (H), Dyspnea       * order for DME     1 Device    Equipment being ordered: Superfeet - Blue - Size C    Plantar fasciitis       polyethylene glycol powder    MIRALAX/GLYCOLAX    527 g    MIX 17 GRAMS(ONE CAPFUL) IN LIQUID AND DRINK ONCE DAILY AS NEEDED FOR CONSTIPATION    Drug-induced constipation       tiotropium 2.5 MCG/ACT inhalation aerosol    SPIRIVA RESPIMAT    12 g    Inhale 2 puffs into the lungs daily    COPD, severe (H)       tiZANidine 4 MG tablet    ZANAFLEX    90 tablet    Take 1 tablet (4 mg) by mouth 3 times daily as needed for muscle spasms    Chronic neck pain, Muscle spasticity       traZODone 100 MG tablet    DESYREL    60 tablet    Take 1.5-2 tablets (150-200 mg) by mouth nightly as needed for sleep    Chronic insomnia       zolpidem 6.25 MG CR tablet    AMBIEN CR    30 tablet    TAKE 1 TABLET BY MOUTH NIGHTLY AS NEEDED FOR SLEEP    Drug-induced constipation       * Notice:  This list has 2 medication(s) that are the same as other medications prescribed for you. Read the directions carefully, and ask your doctor or other care provider to review them with you.

## 2017-11-20 NOTE — NURSING NOTE
"Chief Complaint   Patient presents with     Recheck Medication       Initial Pulse 79  Temp 97.9  F (36.6  C) (Temporal)  Ht 5' 2.5\" (1.588 m)  Wt 186 lb 14.4 oz (84.8 kg)  SpO2 95%  BMI 33.64 kg/m2 Estimated body mass index is 33.64 kg/(m^2) as calculated from the following:    Height as of this encounter: 5' 2.5\" (1.588 m).    Weight as of this encounter: 186 lb 14.4 oz (84.8 kg).  Medication Reconciliation: complete     Maribell Macias CMA  "

## 2017-11-20 NOTE — PROGRESS NOTES
SUBJECTIVE:   Jaimee Rodriguez is a 52 year old female who presents to clinic today for the following health issues:      Medication Followup of all medications    Taking Medication as prescribed: NO    Side Effects:  Constipation    Medication Helping Symptoms:  yes       Jaimee has Chronic neck pain; Major depression in partial remission; COPD, severe (H); and O2 dependent on her pertinent problem list.    History chronic neck pain, maintained on fentanyl and Vicodin.  Her refill has been on schedule.    Rash on both arms/Patient states she just scratches her arms and she breaks out in sores.  Ears bleeding two weeks ago, lasted a week and went away.  No pain in the ear.    History of chronic COPD, full also with pulmonary.  Next appointment is in 4 months.  Patient reports this is stable.  She is still smoking.  She is cutting down.    ROS:  Constitutional, HEENT, cardiovascular, pulmonary, gi and gu systems are negative, except as otherwise noted.      Current Outpatient Prescriptions on File Prior to Visit:  tiZANidine (ZANAFLEX) 4 MG tablet Take 1 tablet (4 mg) by mouth 3 times daily as needed for muscle spasms   zolpidem (AMBIEN CR) 6.25 MG CR tablet TAKE 1 TABLET BY MOUTH NIGHTLY AS NEEDED FOR SLEEP   polyethylene glycol (MIRALAX/GLYCOLAX) powder MIX 17 GRAMS(ONE CAPFUL) IN LIQUID AND DRINK ONCE DAILY AS NEEDED FOR CONSTIPATION   mometasone-formoterol (DULERA) 200-5 MCG/ACT oral inhaler Inhale 2 puffs into the lungs 2 times daily   tiotropium (SPIRIVA RESPIMAT) 2.5 MCG/ACT inhalation aerosol Inhale 2 puffs into the lungs daily   albuterol (PROAIR HFA/PROVENTIL HFA/VENTOLIN HFA) 108 (90 BASE) MCG/ACT Inhaler Inhale 2 puffs into the lungs every 6 hours as needed for shortness of breath / dyspnea or wheezing   order for DME Equipment being ordered: Superfeet - Blue - Size C   ORDER FOR DME Equipment being ordered: Oxygen 4 lpm via nasal cannula continuous flow with any exertion and 2 lpm continuous flow with  "nasal cannula at rest and at night. Provide portability.  Length of need 99.   [DISCONTINUED] traZODone (DESYREL) 50 MG tablet Take 1-2 tablets ( mg) by mouth At Bedtime AT BEDTIME FOR SLEEP   [DISCONTINUED] fluticasone-salmeterol (ADVAIR) 500-50 MCG/DOSE diskus inhaler Inhale 1 puff into the lungs 2 times daily     No current facility-administered medications on file prior to visit.       Problem list, Medication list, Allergies, and Medical/Social/Surgical histories reviewed in [facility] and updated as appropriate.    OBJECTIVE:                                                    /86  Pulse 79  Temp 97.9  F (36.6  C) (Temporal)  Ht 5' 2.5\" (1.588 m)  Wt 186 lb 14.4 oz (84.8 kg)  SpO2 95%  BMI 33.64 kg/m2    GEN: healthy, alert and no distress  HEENT: External ear canals normal bilaterally, tympanic membranes pearly gray.  No fluids no evidence of dried blood.   Skin: On the right forearm there are patches of ecchymosis.  She made a scratch on her skin, the area follow up bruising within a minute.  This is not the typical hives so we normally see.  She actually has bruising and petechial lesion         ASSESSMENT/PLAN:                                                      52 year old female with the following diagnoses and treatment plan:      ICD-10-CM    1. Ecchymosis R58 CBC with platelets   2. Chronic insomnia F51.04 traZODone (DESYREL) 100 MG tablet   3. Encounter for therapeutic drug monitoring Z51.81 Drug  Screen Comprehensive , Urine with Reported Meds (MedTox) (Pain Care Package)   4. Chronic neck pain M54.2 HYDROcodone-acetaminophen (NORCO)  MG per tablet    G89.29 fentaNYL (DURAGESIC) 100 mcg/hr 72 hr patch   5. Fusion of spine of cervical region M43.22 HYDROcodone-acetaminophen (NORCO)  MG per tablet     fentaNYL (DURAGESIC) 100 mcg/hr 72 hr patch   6. Long term current use of opiate analgesic Z79.891 HYDROcodone-acetaminophen (NORCO)  MG per tablet     fentaNYL (DURAGESIC) " 100 mcg/hr 72 hr patch   7. Hyperlipidemia LDL goal <130 E78.5 Lipid panel reflex to direct LDL Fasting   8. Encounter for vitamin deficiency screening Z13.21 Vitamin D Deficiency   9. Vitamin D deficiency  E55.9 Vitamin D Deficiency       -- Patient has bruising and petechial lesion developing from scratching the skin.  It is almost like dermatographia some except that it is more than just plain soft tissue edema.  She is not on prednisone We will check her platelet count.  If no thrombocytopenia, will refer to dermatology.  -- Chronic neck pain: Check urine drug screen.  Continue current treatment.  -- We will check a vitamin D level as well.  Patient has minimal sun exposure.  Patient is due for fasting lipid, she was scheduled a later date.  -- Chronic disease review 6 months.    Will call or return to clinic if worsening or symptoms not improving as discussed.  See Patient Instructions.        Maki Topete MD-PhD  [facility]    (Note: Chart documentation was done in part with Dragon Voice Recognition software. Although reviewed after completion, some word and grammatical errors may remain.)

## 2017-11-20 NOTE — PATIENT INSTRUCTIONS
Make appointment(s) for:   -- get labs today  -- chronic disease review in 6 months.   -- fasting lab for lipids.        Medication(s) prescribed today:    Orders Placed This Encounter   Medications     traZODone (DESYREL) 100 MG tablet     Sig: Take 1.5-2 tablets (150-200 mg) by mouth nightly as needed for sleep     Dispense:  60 tablet     Refill:  3     HYDROcodone-acetaminophen (NORCO)  MG per tablet     Sig: Take 1 tablet by mouth every 6 hours as needed for pain (next refill 12/26/17)     Dispense:  120 tablet     Refill:  0     fentaNYL (DURAGESIC) 100 mcg/hr 72 hr patch     Sig: Place 1 patch onto the skin every 48 hours (next refill 12/26/17)     Dispense:  15 patch     Refill:  0

## 2017-11-21 LAB — DEPRECATED CALCIDIOL+CALCIFEROL SERPL-MC: 26 UG/L (ref 20–75)

## 2017-11-27 LAB — PAIN DRUG SCR UR W RPTD MEDS: NORMAL

## 2017-11-27 NOTE — PROGRESS NOTES
Dear Jaimee,   Here are your recent results.   -- labs were acceptable.     Please call or Mychart to our office if you have further questions.     Maki Topete MD-PhD

## 2017-11-28 ENCOUNTER — MYC MEDICAL ADVICE (OUTPATIENT)
Dept: PULMONOLOGY | Facility: CLINIC | Age: 52
End: 2017-11-28

## 2017-11-30 NOTE — TELEPHONE ENCOUNTER
Given patient's symptoms, advised patient to be seen by ED, Urgent Care, or PCP. Message routed to Dr. Perlman for further recommendations.     Megan GIANG, RN, BSN  Care Coordinator

## 2017-12-01 ENCOUNTER — TELEPHONE (OUTPATIENT)
Dept: PEDIATRICS | Facility: CLINIC | Age: 52
End: 2017-12-01

## 2017-12-01 NOTE — TELEPHONE ENCOUNTER
E-mail sent to Dr. Perlman for additional recommendations for patient.     Megan GIANG, RN, BSN  Care Coordinator

## 2017-12-01 NOTE — TELEPHONE ENCOUNTER
"Per Dr. Perlman, he has no new recommendations for patient. He states, \"No other recs on Jaimee, she should probably get abx, hopefully will get from PCP.\"     Megan GIANG RN, BSN  Care Coordinator       "

## 2017-12-01 NOTE — TELEPHONE ENCOUNTER
[facility]    Form Documentation Form or Letter Request    Type or form/letter needing completion: Attending physician statement  Provider: ho  Has provider seen patient for office visit related to reason for form request? Yes  Date form needed: when done  Once completed: Patient will  at .   Ilene VASQUEZ

## 2017-12-13 ENCOUNTER — MYC REFILL (OUTPATIENT)
Dept: PEDIATRICS | Facility: CLINIC | Age: 52
End: 2017-12-13

## 2017-12-13 ENCOUNTER — MYC MEDICAL ADVICE (OUTPATIENT)
Dept: PEDIATRICS | Facility: CLINIC | Age: 52
End: 2017-12-13

## 2017-12-13 DIAGNOSIS — K59.03 DRUG-INDUCED CONSTIPATION: ICD-10-CM

## 2017-12-13 RX ORDER — POLYETHYLENE GLYCOL 3350 17 G/17G
POWDER, FOR SOLUTION ORAL
Qty: 527 G | Refills: 5 | Status: SHIPPED | OUTPATIENT
Start: 2017-12-13 | End: 2019-04-19

## 2017-12-13 NOTE — TELEPHONE ENCOUNTER
polyethylene glycol (MIRALAX/GLYCOLAX) powder      Last Written Prescription Date: 10/27/2017  Last Fill Quantity: 527 g,  # refills: 0   Last Office Visit with [facility], [facility] or [facility] prescribing provider: 11/20/2017                                         Next 5 appointments (look out 90 days)     Feb 22, 2018  9:00 AM CST   Office Visit with PFT LAB   [facility])    [address] [place] [id number] [phone]            Feb 22, 2018 10:00 AM CST   Return Visit with David Morris Perlman, MD   [facility] ([facility])    [address] [place] [id number] [phone]                Refilled per [facility] protocol.    Shanna Maria RN

## 2017-12-13 NOTE — TELEPHONE ENCOUNTER
Routed [employer] message to PCP    On 11/30/17 I had dropped off a form for my Long Term Disability to be filled out and needed to pick back up when completed as I have other forms to send in to the Insurance Company. I was checking to see if this has been completed so I can pick it up. I needed to have it back to insurance company in 30 days.    Thanks,  Jaimee Lan RN,   [facility]

## 2017-12-13 NOTE — TELEPHONE ENCOUNTER
Message from [employer]:  Original authorizing provider: Maki Topete MD PhD    Jaieme Rodriguez would like a refill of the following medications:  polyethylene glycol (MIRALAX/GLYCOLAX) powder [Maki Topete MD PhD]    Preferred pharmacy: [facility] [address] [place], [place] [id number] [place] [address]    Comment:

## 2017-12-18 NOTE — TELEPHONE ENCOUNTER
My chart message sent to patient stating: The [employer] form has been completed by Dr. Topete and has been placed at the , Check in C for you to .    Maribell Macias CMA

## 2017-12-21 ENCOUNTER — MYC REFILL (OUTPATIENT)
Dept: PEDIATRICS | Facility: CLINIC | Age: 52
End: 2017-12-21

## 2017-12-21 DIAGNOSIS — G89.29 CHRONIC NECK PAIN: ICD-10-CM

## 2017-12-21 DIAGNOSIS — Z79.891 LONG TERM CURRENT USE OF OPIATE ANALGESIC: ICD-10-CM

## 2017-12-21 DIAGNOSIS — M43.22 FUSION OF SPINE OF CERVICAL REGION: ICD-10-CM

## 2017-12-21 DIAGNOSIS — M54.2 CHRONIC NECK PAIN: ICD-10-CM

## 2017-12-21 NOTE — TELEPHONE ENCOUNTER
Message from MyChart:  Original authorizing provider: Maki Topete MD PhD    Jaimee Rodriguez would like a refill of the following medications:  HYDROcodone-acetaminophen (NORCO)  MG per tablet [Maki Topete MD PhD]  fentaNYL (DURAGESIC) 100 mcg/hr 72 hr patch [Maki Topete MD PhD]    Preferred pharmacy: [facility] [address] [place] [address]    Comment:

## 2017-12-22 RX ORDER — HYDROCODONE BITARTRATE AND ACETAMINOPHEN 10; 325 MG/1; MG/1
1 TABLET ORAL EVERY 6 HOURS PRN
Qty: 120 TABLET | Refills: 0 | Status: SHIPPED | OUTPATIENT
Start: 2017-12-26 | End: 2018-01-25

## 2017-12-22 RX ORDER — FENTANYL 100 UG/1
1 PATCH TRANSDERMAL
Qty: 15 PATCH | Refills: 0 | Status: SHIPPED | OUTPATIENT
Start: 2017-12-26 | End: 2018-01-25

## 2017-12-22 NOTE — TELEPHONE ENCOUNTER
Routing refill request to provider for review/approval because:  Drug not on the [facility] refill protocol   HYDROcodone-acetaminophen (NORCO)  MG per tablet 120 tablet 0 11/24/2017  No      Sig: Take 1 tablet by mouth every 6 hours as needed for pain (next refill 12/26/17)     Class: Local Print     fentaNYL (DURAGESIC) 100 mcg/hr 72 hr patch 15 patch 0 11/24/2017  No      Sig: Place 1 patch onto the skin every 48 hours (next refill 12/26/17)         Last office visit:  11/20/17      Carla Lan RN,   [facility]

## 2017-12-22 NOTE — TELEPHONE ENCOUNTER
RX placed at check in C.    Patient notified via [employer] [facility] ready for  and start date 12/26/17.    Carla Lan RN,   [facility]

## 2017-12-22 NOTE — TELEPHONE ENCOUNTER
Prescription done, please fax prescription and inform patient  As noted in last refill, start date- 12/26/17

## 2018-01-25 ENCOUNTER — MYC REFILL (OUTPATIENT)
Dept: PEDIATRICS | Facility: CLINIC | Age: 53
End: 2018-01-25

## 2018-01-25 DIAGNOSIS — M43.22 FUSION OF SPINE OF CERVICAL REGION: ICD-10-CM

## 2018-01-25 DIAGNOSIS — G89.29 CHRONIC NECK PAIN: ICD-10-CM

## 2018-01-25 DIAGNOSIS — Z79.891 LONG TERM CURRENT USE OF OPIATE ANALGESIC: ICD-10-CM

## 2018-01-25 DIAGNOSIS — M54.2 CHRONIC NECK PAIN: ICD-10-CM

## 2018-01-25 RX ORDER — HYDROCODONE BITARTRATE AND ACETAMINOPHEN 10; 325 MG/1; MG/1
1 TABLET ORAL EVERY 6 HOURS PRN
Qty: 120 TABLET | Refills: 0 | Status: SHIPPED | OUTPATIENT
Start: 2018-01-25 | End: 2018-02-25

## 2018-01-25 RX ORDER — FENTANYL 100 UG/1
1 PATCH TRANSDERMAL
Qty: 15 PATCH | Refills: 0 | Status: SHIPPED | OUTPATIENT
Start: 2018-01-25 | End: 2018-02-25

## 2018-01-25 NOTE — TELEPHONE ENCOUNTER
HYDROcodone-acetaminophen (NORCO)  MG per tablet      Last Written Prescription Date:  12/26/17  Last Fill Quantity: 120,   # refills: 0  Last Office Visit: 11/20/17  Future Office visit:    Next 5 appointments (look out 90 days)     Feb 22, 2018  9:00 AM CST   Office Visit with PFT LAB   [facility]    [address] [place] [id number]   [phone]            Feb 22, 2018 10:00 AM CST   Return Visit with David Morris Perlman, MD   [facility])    [address] [place] [phone]   [phone]                   Routing refill request to provider for review/approval because:  Drug not on the [facility], [facility] or [facility] refill protocol or controlled substance    fentaNYL (DURAGESIC) 100 mcg/hr 72 hr patch      Last Written Prescription Date:  12/26/17  Last Fill Quantity: 15 patches,   # refills: 0  Last Office Visit: 11/20/17  Future Office visit:    Next 5 appointments (look out 90 days)     Feb 22, 2018  9:00 AM CST   Office Visit with PFT LAB   [facility])    [address] [place] [phone]   [phone]            Feb 22, 2018 10:00 AM CST   Return Visit with David Morris Perlman, MD   [facility])    [address] [place] [phone]   [phone]                   Routing refill request to provider for review/approval because:  Drug not on the [facility], [facility] or [facility] refill protocol or controlled substance

## 2018-01-25 NOTE — TELEPHONE ENCOUNTER
Message from MyChart:  Original authorizing provider: MD Tanner Wrightjony FITZPATRICKIlda Velásquezcharlie would like a refill of the following medications:  HYDROcodone-acetaminophen (NORCO)  MG per tablet [Carlyn Helm MD]  fentaNYL (DURAGESIC) 100 mcg/hr 72 hr patch [Carlyn Helm MD]    Preferred pharmacy: Other - [facility]     Comment:

## 2018-01-26 ENCOUNTER — TELEPHONE (OUTPATIENT)
Dept: PEDIATRICS | Facility: CLINIC | Age: 53
End: 2018-01-26

## 2018-01-26 NOTE — TELEPHONE ENCOUNTER
Hello,    Please contact [employer] for a PA on fentanyl.. Phone # [phone], id# [id number].  Please let us know when this is approved.        Thank You,  Marium Field  Pharmacy Technician  [facility]  [phone]

## 2018-01-29 NOTE — TELEPHONE ENCOUNTER
Received encounter from onsite pharmacy stating that patients insurance plan does not cover Rx for fentaNYL (DURAGESIC) 100 mcg/hr 72 hr patch. Patient needs change of medication or prior authorization. Routing to PCP for review.  Sola Madrigal CMA

## 2018-01-29 NOTE — TELEPHONE ENCOUNTER
Prior Authorization Approval    Authorization Effective Date: 1/1/2018  Authorization Expiration Date: 1/31/2019  Medication: fentaNYL (DURAGESIC) 100 mcg/hr 72 hr patch-APPROVED  Approved Dose/Quantity:   Reference #:     Insurance Company: [employer] - Phone [phone] Fax [phone]  Expected CoPay: $1.25     CoPay Card Available:      Foundation Assistance Needed:    Which Pharmacy is filling the prescription (Not needed for infusion/clinic administered): [facility] - [place], [place] - [address], [address]  Pharmacy Notified: Yes  Patient Notified: Yes    Will update with approval fax.

## 2018-01-29 NOTE — TELEPHONE ENCOUNTER
Central Prior Authorization Team   Phone: [phone]      PA Initiation    Medication: fentaNYL (DURAGESIC) 100 mcg/hr 72 hr patch-  Insurance Company: [employer] - Phone [phone] Fax [phone]  Pharmacy Filling the Rx: [facility] - [place], [place] - [address], [address]  Filling Pharmacy Phone: [phone]  Filling Pharmacy Fax:    Start Date: 1/29/2018

## 2018-02-20 ENCOUNTER — RADIANT APPOINTMENT (OUTPATIENT)
Dept: CT IMAGING | Facility: CLINIC | Age: 53
End: 2018-02-20
Attending: INTERNAL MEDICINE
Payer: COMMERCIAL

## 2018-02-20 DIAGNOSIS — J43.2 CENTRILOBULAR EMPHYSEMA (H): ICD-10-CM

## 2018-02-20 DIAGNOSIS — R91.8 PULMONARY NODULES: ICD-10-CM

## 2018-02-20 PROCEDURE — 71250 CT THORAX DX C-: CPT | Performed by: RADIOLOGY

## 2018-02-22 ENCOUNTER — OFFICE VISIT (OUTPATIENT)
Dept: NURSING | Facility: CLINIC | Age: 53
End: 2018-02-22
Payer: COMMERCIAL

## 2018-02-22 ENCOUNTER — MYC MEDICAL ADVICE (OUTPATIENT)
Dept: PEDIATRICS | Facility: CLINIC | Age: 53
End: 2018-02-22

## 2018-02-22 ENCOUNTER — OFFICE VISIT (OUTPATIENT)
Dept: PULMONOLOGY | Facility: CLINIC | Age: 53
End: 2018-02-22
Payer: COMMERCIAL

## 2018-02-22 VITALS
SYSTOLIC BLOOD PRESSURE: 142 MMHG | BODY MASS INDEX: 32.46 KG/M2 | OXYGEN SATURATION: 94 % | WEIGHT: 183.2 LBS | RESPIRATION RATE: 24 BRPM | HEIGHT: 63 IN | DIASTOLIC BLOOD PRESSURE: 85 MMHG | HEART RATE: 91 BPM

## 2018-02-22 DIAGNOSIS — J43.9 PULMONARY EMPHYSEMA, UNSPECIFIED EMPHYSEMA TYPE (H): Primary | ICD-10-CM

## 2018-02-22 DIAGNOSIS — R91.8 PULMONARY NODULES: ICD-10-CM

## 2018-02-22 DIAGNOSIS — J43.2 CENTRILOBULAR EMPHYSEMA (H): ICD-10-CM

## 2018-02-22 PROCEDURE — 94729 DIFFUSING CAPACITY: CPT | Performed by: INTERNAL MEDICINE

## 2018-02-22 PROCEDURE — 99207 ZZC DROP WITH A PROCEDURE: CPT | Performed by: INTERNAL MEDICINE

## 2018-02-22 PROCEDURE — 94375 RESPIRATORY FLOW VOLUME LOOP: CPT | Performed by: INTERNAL MEDICINE

## 2018-02-22 PROCEDURE — 99214 OFFICE O/P EST MOD 30 MIN: CPT | Mod: 25 | Performed by: INTERNAL MEDICINE

## 2018-02-22 RX ORDER — GUAIFENESIN 600 MG/1
600 TABLET, EXTENDED RELEASE ORAL 2 TIMES DAILY PRN
Qty: 20 TABLET | Refills: 0 | Status: SHIPPED | OUTPATIENT
Start: 2018-02-22 | End: 2018-03-26

## 2018-02-22 NOTE — PROGRESS NOTES
Reason for Visit  Jaimee Rodriguez is a 52 year old year old female who is being seen for COPD    ILD HPI    Jaimee Rodriguez is a 52-year-old female with COPD who is seen today for follow-up.  Overall she states her breathing remains stable she continues on her Advair and Dulera and uses her albuterol inhaler 3-4 times a week.  She continues to smoke about three quarters of a pack of cigarettes daily.  Otherwise overall feels stable has not had any significant exacerbations since her last visit.      Current Outpatient Prescriptions   Medication     tiotropium (SPIRIVA RESPIMAT) 2.5 MCG/ACT inhalation aerosol     mometasone-formoterol (DULERA) 200-5 MCG/ACT oral inhaler     guaiFENesin (MUCINEX) 600 MG 12 hr tablet     HYDROcodone-acetaminophen (NORCO)  MG per tablet     fentaNYL (DURAGESIC) 100 mcg/hr 72 hr patch     polyethylene glycol (MIRALAX/GLYCOLAX) powder     traZODone (DESYREL) 100 MG tablet     tiZANidine (ZANAFLEX) 4 MG tablet     zolpidem (AMBIEN CR) 6.25 MG CR tablet     albuterol (PROAIR HFA/PROVENTIL HFA/VENTOLIN HFA) 108 (90 BASE) MCG/ACT Inhaler     order for DME     ORDER FOR DME     [DISCONTINUED] mometasone-formoterol (DULERA) 200-5 MCG/ACT oral inhaler     [DISCONTINUED] tiotropium (SPIRIVA RESPIMAT) 2.5 MCG/ACT inhalation aerosol     [DISCONTINUED] fluticasone-salmeterol (ADVAIR) 500-50 MCG/DOSE diskus inhaler     No current facility-administered medications for this visit.      Allergies   Allergen Reactions     Morphine      Past Medical History:   Diagnosis Date     Cervical spondylosis without myelopathy     With radiculopathy.     Contact dermatitis and other eczema, due to unspecified cause     eczema     Endometriosis, site unspecified     R uterosacral ligament/surgically removed     Irregular menstrual cycle      NONSPECIFIC MEDICAL HISTORY     Mood swings, Irritability     Other acne      Other and unspecified ovarian cyst     sydni R     Tobacco use disorder 11/14/2001       Past  "Surgical History:   Procedure Laterality Date     C APPENDECTOMY       C  DELIVERY ONLY  ,,    , Low Cervical     C LIGATE FALLOPIAN TUBE      S/P tubal ligation     C TOTAL ABDOM HYSTERECTOMY      not total     pt did not have ovaries removed     HC ENLARGE BREAST WITH IMPLANT       SURGICAL HISTORY OF -   10/18/2005    C6 corpectomy with iliac crest autograft fixation. [facility]       Social History     Social History     Marital status:      Spouse name: N/A     Number of children: N/A     Years of education: N/A     Occupational History     Not on file.     Social History Main Topics     Smoking status: Current Every Day Smoker     Packs/day: 0.75     Years: 35.00     Types: Cigarettes     Smokeless tobacco: Never Used      Comment: started age 14.     Alcohol use 0.0 oz/week     0 Standard drinks or equivalent per week      Comment: occ     Drug use: No     Sexual activity: Yes     Partners: Male     Birth control/ protection: Surgical     Other Topics Concern      Service No     Blood Transfusions No     Caffeine Concern No     Occupational Exposure No     Hobby Hazards No     Sleep Concern Yes     Stress Concern No     Weight Concern No     Special Diet No     not a milk drinker     Back Care No     Exercise No     walks at work     Seat Belt Yes     Self-Exams No     Social History Narrative       Family History   Problem Relation Age of Onset     CEREBROVASCULAR DISEASE Mother      Chronic Obstructive Pulmonary Disease Mother      Arthritis Father      DIABETES Maternal Grandmother      CANCER Maternal Aunt      breast       ROS Pulmonary    A complete ROS was otherwise negative except as noted in the HPI.  Vitals:    18 0922   BP: 142/85   BP Location: Left arm   Patient Position: Chair   Cuff Size: Adult Regular   Pulse: 91   Resp: 24   SpO2: 94%   Weight: 83.1 kg (183 lb 3.2 oz)   Height: 1.607 m (5' 3.25\")     Exam:   GENERAL " APPEARANCE: Well developed, well nourished, alert, and in no apparent distress.  NECK: supple, no masses, no thyromegaly.  LYMPHATICS: No significant axillary, cervical, or supraclavicular nodes.  RESP: decreased BS bilaterally  CV: Normal S1, S2, regular rhythm, normal rate, no rub, no murmur,  no gallop, no LE edema.   ABDOMEN:  Bowel sounds normal, soft, nontender, no HSM or masses.   MS: extremities normal- no clubbing, no cyanosis.  SKIN: no rash on limited exam  NEURO: Mentation intact, speech normal, normal strength and tone, normal gait and stance  PSYCH: mentation appears normal. and affect normal/bright  Results: I have reviewed all imaging, PFTs and other relavent tests, please see below for details, PFT and imaging results were reviewed with the patient.  PFTs: moderate obstruction with air trapping, stable    Assessment and plan:    52-year-old female with COPD stable symptoms on her current regimen.    Problem #1 COPD we will continue with her current regimen I think she can follow up annually.    Problem #2 pulmonary nodules repeat CT scan showed either decrease or stability of all of the noted pulmonary nodule since 2014.  Therefore she does not need any more specific follow-up.  We will get her back into the lung cancer screening program.    I will see her back in 1 year or sooner as needed.          CBC   Recent Labs   Lab Test  11/20/17   1337  12/19/16   0855   RBC  4.43  4.61   HGB  13.3  13.8   HCT  40.6  41.5   PLT  302  280       Basic Metabolic Panel  Recent Labs   Lab Test  12/19/16   0855  02/04/15   1201   NA  141  140   POTASSIUM  4.4  4.1   CHLORIDE  104  103   CO2  30  33*   BUN  13  20   GLC  100*  94   JOSEPH  9.0  9.1       INR  No results for input(s): INR in the last 70893 hours.    PFT  PFT Latest Ref Rng & Units 2/22/2018   FVC L 2.11   FEV1 L 1.07   FVC% % 66   FEV1% % 42           CC:

## 2018-02-22 NOTE — MR AVS SNAPSHOT
After Visit Summary   2/22/2018    Jaimee Rodriguez    MRN: [id number]           Patient Information     Date Of Birth          1965        Visit Information        Provider Department      2/22/2018 10:00 AM Perlman, David Morris, MD [facility]        Today's Diagnoses     Pulmonary emphysema, unspecified emphysema type (H)    -  1       Follow-ups after your visit        Follow-up notes from your care team     Return in about 1 year (around 2/22/2019).      Your next 10 appointments already scheduled     Feb 22, 2018 10:00 AM CST   Return Visit with David Morris Perlman, MD   [facility]    [address] [place] [address] [id number] [phone]            Feb 07, 2019  1:00 PM CST   Office Visit with PFT LAB   [facility]    [address] [place] [address] [id number] [phone]           Bring a current list of meds and any records pertaining to this visit. For Physicals, please bring immunization records and any forms needing to be filled out. Please arrive 10 minutes early to complete paperwork.            Feb 07, 2019  2:00 PM CST   Return Visit with David Morris Perlman, MD   [facility]    [address] [place] [address] [id number] [phone]              Future tests that were ordered for you today     Open Future Orders        Priority Expected Expires Ordered    General PFT Lab (Please always keep checked) Routine  2/22/2019 2/22/2018    Pulmonary Function Test Routine  2/22/2019 2/22/2018            Who to contact     If you have questions or need follow up information about today's clinic visit or your schedule please contact [facility] directly at [phone].  Normal or non-critical lab and imaging results will be communicated to you by MyChart, letter or phone within 4 business days  "after the clinic has received the results. If you do not hear from us within 7 days, please contact the clinic through [employer] or phone. If you have a critical or abnormal lab result, we will notify you by phone as soon as possible.  Submit refill requests through [employer] or call your pharmacy and they will forward the refill request to us. Please allow 3 business days for your refill to be completed.          Additional Information About Your Visit        [employer] Information     [employer] gives you secure access to your electronic health record. If you see a primary care provider, you can also send messages to your care team and make appointments. If you have questions, please call your primary care clinic.  If you do not have a primary care provider, please call [phone] and they will assist you.      [employer] is an electronic gateway that provides easy, online access to your medical records. With [employer], you can request a clinic appointment, read your test results, renew a prescription or communicate with your care team.     To access your existing account, please contact your [facility] or call [phone] for assistance.        Care EveryWhere ID     This is your Care EveryWhere ID. This could be used by other organizations to access your [place] medical records  [id number]        Your Vitals Were     Pulse Respirations Height Pulse Oximetry BMI (Body Mass Index)       91 24 1.607 m (5' 3.25\") 94% 32.2 kg/m2        Blood Pressure from Last 3 Encounters:   02/22/18 142/85   11/20/17 142/86   08/03/17 137/85    Weight from Last 3 Encounters:   02/22/18 83.1 kg (183 lb 3.2 oz)   11/20/17 84.8 kg (186 lb 14.4 oz)   08/03/17 84.9 kg (187 lb 1.6 oz)                 Today's Medication Changes          These changes are accurate as of 2/22/18  9:41 AM.  If you have any questions, ask your nurse or doctor.               Start taking these medicines.        Dose/Directions    " guaiFENesin 600 MG 12 hr tablet   Commonly known as:  MUCINEX   Used for:  Pulmonary emphysema, unspecified emphysema type (H)   Started by:  Perlman, David Morris, MD        Dose:  600 mg   Take 1 tablet (600 mg) by mouth 2 times daily as needed for congestion   Quantity:  20 tablet   Refills:  0            Where to get your medicines      These medications were sent to [facility] [address] [facility] [address] [address] [address]  [id number] [place] [address], [facility] [address]     Phone:  [phone]     guaiFENesin 600 MG 12 hr tablet    mometasone-formoterol 200-5 MCG/ACT oral inhaler    tiotropium 2.5 MCG/ACT inhalation aerosol                Primary Care Provider Office Phone # Fax #    Maki Topete MD [facility] [phone] [address]  [facility] [address]        Equal Access to Services     [facility]: Hadii pat ku hadasho Soomaali, waaxda luqadaha, qaybta kaalmada adeegyada, waxay maureenin hayaan ademarshall lay . So [facility] [phone].    ATENCIÓN: Si habla español, tiene a pepe disposición servicios gratuitos de asistencia lingüística. Mcame al [phone].    We comply with applicable federal civil rights laws and [place] laws. We do not discriminate on the basis of race, color, national origin, age, disability, sex, sexual orientation, or gender identity.            Thank you!     Thank you for choosing [facility]  for your care. Our goal is always to provide you with excellent care. Hearing back from our patients is one way we can continue to improve our services. Please take a few minutes to complete the written survey that you may receive in the mail after your visit with us. Thank you!             Your Updated Medication List - Protect others around you: Learn how to safely use, store and throw away your medicines at www.disposemymeds.org.          This list is accurate as of 2/22/18  9:41 AM.  Always use your most recent med list.                    Brand Name Dispense Instructions for use Diagnosis    albuterol 108 (90 BASE) MCG/ACT Inhaler    PROAIR HFA/PROVENTIL HFA/VENTOLIN HFA    1 Inhaler    Inhale 2 puffs into the lungs every 6 hours as needed for shortness of breath / dyspnea or wheezing    Obstructive chronic bronchitis with exacerbation (H)       fentaNYL 100 mcg/hr 72 hr patch    DURAGESIC    15 patch    Place 1 patch onto the skin every 48 hours (next refill 12/26/17)    Chronic neck pain, Fusion of spine of cervical region, Long term current use of opiate analgesic       guaiFENesin 600 MG 12 hr tablet    MUCINEX    20 tablet    Take 1 tablet (600 mg) by mouth 2 times daily as needed for congestion    Pulmonary emphysema, unspecified emphysema type (H)       HYDROcodone-acetaminophen  MG per tablet    NORCO    120 tablet    Take 1 tablet by mouth every 6 hours as needed for pain (next refill 12/26/17)    Chronic neck pain, Fusion of spine of cervical region, Long term current use of opiate analgesic       mometasone-formoterol 200-5 MCG/ACT oral inhaler    DULERA    1 Inhaler    Inhale 2 puffs into the lungs 2 times daily    Pulmonary emphysema, unspecified emphysema type (H)       * order for DME     1 Device    Equipment being ordered: Oxygen 4 lpm via nasal cannula continuous flow with any exertion and 2 lpm continuous flow with nasal cannula at rest and at night. Provide portability.  Length of need 99.    COPD (chronic obstructive pulmonary disease) (H), Dyspnea       * order for DME     1 Device    Equipment being ordered: Superfeet - Blue - Size C    Plantar fasciitis       polyethylene glycol powder    MIRALAX/GLYCOLAX    527 g    MIX 17 GRAMS(ONE CAPFUL) IN LIQUID AND DRINK ONCE DAILY AS NEEDED FOR CONSTIPATION    Drug-induced constipation       tiotropium 2.5 MCG/ACT inhalation aerosol    SPIRIVA RESPIMAT    12 g    Inhale 2 puffs into the lungs daily    Pulmonary emphysema, unspecified emphysema type (H)       tiZANidine 4 MG  tablet    ZANAFLEX    90 tablet    Take 1 tablet (4 mg) by mouth 3 times daily as needed for muscle spasms    Chronic neck pain, Muscle spasticity       traZODone 100 MG tablet    DESYREL    60 tablet    Take 1.5-2 tablets (150-200 mg) by mouth nightly as needed for sleep    Chronic insomnia       zolpidem 6.25 MG CR tablet    AMBIEN CR    30 tablet    TAKE 1 TABLET BY MOUTH NIGHTLY AS NEEDED FOR SLEEP    Drug-induced constipation       * Notice:  This list has 2 medication(s) that are the same as other medications prescribed for you. Read the directions carefully, and ask your doctor or other care provider to review them with you.

## 2018-02-22 NOTE — TELEPHONE ENCOUNTER
"CT chest manually released for patient to view in [employer]. Per Dr. Perlman's office visit note today 2/22: \"pulmonary nodules repeat CT scan showed either decrease or stability of all of the noted pulmonary nodule since 2014.  Therefore she does not need any more specific follow-up.  We will get her back into the lung cancer screening program.\"    Will communicate this to patient via [employer].     Megan GIANG RN, BSN  Care Coordinator       "

## 2018-02-22 NOTE — MR AVS SNAPSHOT
After Visit Summary   2/22/2018    Jaimee Rodriguez    MRN: [id number]           Patient Information     Date Of Birth          1965        Visit Information        Provider Department      2/22/2018 9:00 AM PFT LAB [facility]        Today's Diagnoses     Centrilobular emphysema (H)        Pulmonary nodules           Follow-ups after your visit        Your next 10 appointments already scheduled     Feb 22, 2018 10:00 AM CST   Return Visit with David Morris Perlman, MD   [facility] ([facility]    [address] [place] [id number] [phone]              Who to contact     If you have questions or need follow up information about today's clinic visit or your schedule please contact [facility] directly at [phone].  Normal or non-critical lab and imaging results will be communicated to you by [employer], letter or phone within 4 business days after the clinic has received the results. If you do not hear from us within 7 days, please contact the clinic through [employer] or phone. If you have a critical or abnormal lab result, we will notify you by phone as soon as possible.  Submit refill requests through [employer] or call your pharmacy and they will forward the refill request to us. Please allow 3 business days for your refill to be completed.          Additional Information About Your Visit        [employer] Information     [employer] gives you secure access to your electronic health record. If you see a primary care provider, you can also send messages to your care team and make appointments. If you have questions, please call your primary care clinic.  If you do not have a primary care provider, please call [phone] and they will assist you.      [employer] is an electronic gateway that provides easy, online access to your medical records. With [employer], you can request a clinic appointment, read your test results, renew  a prescription or communicate with your care team.     To access your existing account, please contact your [facility] or call [phone] for assistance.        Care EveryWhere ID     This is your Care EveryWhere ID. This could be used by other organizations to access your [place] medical records  [id number]         Blood Pressure from Last 3 Encounters:   11/20/17 142/86   08/03/17 137/85   04/06/17 (!) 143/93    Weight from Last 3 Encounters:   11/20/17 84.8 kg (186 lb 14.4 oz)   08/03/17 84.9 kg (187 lb 1.6 oz)   04/06/17 91.4 kg (201 lb 6.4 oz)              We Performed the Following     General PFT Lab (Please always keep checked)     HC DIFFUSING CAPACITY     Pulmonary Function Test     RESPIRATORY FLOW VOLUME LOOP        Primary Care Provider Office Phone # Fax #    Maki Topete MD PhD [phone]       [address]  [facility] [address]        Equal Access to Services     EWELINA PANDA : Hadii aad ku hadasho Soomaali, waaxda luqadaha, qaybta kaalmada adeegyada, waxay idiin haytammyn rené mayaaratino lay . So [facility] [phone].    ATENCIÓN: Si habla español, tiene a pepe disposición servicios gratuitos de asistencia lingüística. Llame al [phone].    We comply with applicable federal civil rights laws and [place] laws. We do not discriminate on the basis of race, color, national origin, age, disability, sex, sexual orientation, or gender identity.            Thank you!     Thank you for choosing [facility]  for your care. Our goal is always to provide you with excellent care. Hearing back from our patients is one way we can continue to improve our services. Please take a few minutes to complete the written survey that you may receive in the mail after your visit with us. Thank you!             Your Updated Medication List - Protect others around you: Learn how to safely use, store and throw away your medicines at www.disposemymeds.org.           This list is accurate as of 2/22/18  9:26 AM.  Always use your most recent med list.                   Brand Name Dispense Instructions for use Diagnosis    albuterol 108 (90 BASE) MCG/ACT Inhaler    PROAIR HFA/PROVENTIL HFA/VENTOLIN HFA    1 Inhaler    Inhale 2 puffs into the lungs every 6 hours as needed for shortness of breath / dyspnea or wheezing    Obstructive chronic bronchitis with exacerbation (H)       fentaNYL 100 mcg/hr 72 hr patch    DURAGESIC    15 patch    Place 1 patch onto the skin every 48 hours (next refill 12/26/17)    Chronic neck pain, Fusion of spine of cervical region, Long term current use of opiate analgesic       HYDROcodone-acetaminophen  MG per tablet    NORCO    120 tablet    Take 1 tablet by mouth every 6 hours as needed for pain (next refill 12/26/17)    Chronic neck pain, Fusion of spine of cervical region, Long term current use of opiate analgesic       mometasone-formoterol 200-5 MCG/ACT oral inhaler    DULERA    1 Inhaler    Inhale 2 puffs into the lungs 2 times daily    COPD, severe (H)       * order for DME     1 Device    Equipment being ordered: Oxygen 4 lpm via nasal cannula continuous flow with any exertion and 2 lpm continuous flow with nasal cannula at rest and at night. Provide portability.  Length of need 99.    COPD (chronic obstructive pulmonary disease) (H), Dyspnea       * order for DME     1 Device    Equipment being ordered: Superfeet - Blue - Size C    Plantar fasciitis       polyethylene glycol powder    MIRALAX/GLYCOLAX    527 g    MIX 17 GRAMS(ONE CAPFUL) IN LIQUID AND DRINK ONCE DAILY AS NEEDED FOR CONSTIPATION    Drug-induced constipation       tiotropium 2.5 MCG/ACT inhalation aerosol    SPIRIVA RESPIMAT    12 g    Inhale 2 puffs into the lungs daily    COPD, severe (H)       tiZANidine 4 MG tablet    ZANAFLEX    90 tablet    Take 1 tablet (4 mg) by mouth 3 times daily as needed for muscle spasms    Chronic neck pain, Muscle spasticity        traZODone 100 MG tablet    DESYREL    60 tablet    Take 1.5-2 tablets (150-200 mg) by mouth nightly as needed for sleep    Chronic insomnia       zolpidem 6.25 MG CR tablet    AMBIEN CR    30 tablet    TAKE 1 TABLET BY MOUTH NIGHTLY AS NEEDED FOR SLEEP    Drug-induced constipation       * Notice:  This list has 2 medication(s) that are the same as other medications prescribed for you. Read the directions carefully, and ask your doctor or other care provider to review them with you.

## 2018-02-22 NOTE — TELEPHONE ENCOUNTER
CT ordered by Dr. Perlman.  Routed to pulmonology to review CT results.    Carla Lan RN,   [facility]

## 2018-02-22 NOTE — NURSING NOTE
"Jaimee Rodriguez's goals for this visit include: COPD  She requests these members of her care team be copied on today's visit information: yes    PCP: Maki Topete    Referring Provider:  No referring provider defined for this encounter.    Chief Complaint   Patient presents with     COPD       Initial /85 (BP Location: Left arm, Patient Position: Chair, Cuff Size: Adult Regular)  Pulse 91  Resp 24  Ht 1.607 m (5' 3.25\")  Wt 83.1 kg (183 lb 3.2 oz)  SpO2 94%  BMI 32.2 kg/m2 Estimated body mass index is 32.2 kg/(m^2) as calculated from the following:    Height as of this encounter: 1.607 m (5' 3.25\").    Weight as of this encounter: 83.1 kg (183 lb 3.2 oz).  Medication Reconciliation: complete    Do you need any medication refills at today's visit? Yes- needs a medication change for the dulera    "

## 2018-02-23 LAB
DLCOUNC-%PRED-PRE: 58 %
DLCOUNC-PRE: 12.62 ML/MIN/MMHG
DLCOUNC-PRED: 21.72 ML/MIN/MMHG
ERV-%PRED-PRE: 85 %
ERV-PRE: 0.46 L
ERV-PRED: 0.54 L
EXPTIME-PRE: 6.47 SEC
FEF2575-%PRED-PRE: 17 %
FEF2575-PRE: 0.44 L/SEC
FEF2575-PRED: 2.48 L/SEC
FEFMAX-%PRED-PRE: 61 %
FEFMAX-PRE: 3.94 L/SEC
FEFMAX-PRED: 6.37 L/SEC
FEV1-%PRED-PRE: 42 %
FEV1-PRE: 1.07 L
FEV1FEV6-PRE: 52 %
FEV1FEV6-PRED: 82 %
FEV1FVC-PRE: 51 %
FEV1FVC-PRED: 80 %
FEV1SVC-PRE: 39 %
FEV1SVC-PRED: 79 %
FIFMAX-PRE: 5.81 L/SEC
FVC-%PRED-PRE: 66 %
FVC-PRE: 2.11 L
FVC-PRED: 3.18 L
IC-%PRED-PRE: 85 %
IC-PRE: 2.28 L
IC-PRED: 2.67 L
VA-%PRED-PRE: 97 %
VA-PRE: 4.72 L
VC-%PRED-PRE: 85 %
VC-PRE: 2.74 L
VC-PRED: 3.21 L

## 2018-02-25 ENCOUNTER — MYC REFILL (OUTPATIENT)
Dept: PEDIATRICS | Facility: CLINIC | Age: 53
End: 2018-02-25

## 2018-02-25 DIAGNOSIS — Z79.891 LONG TERM CURRENT USE OF OPIATE ANALGESIC: ICD-10-CM

## 2018-02-25 DIAGNOSIS — M54.2 CHRONIC NECK PAIN: ICD-10-CM

## 2018-02-25 DIAGNOSIS — M43.22 FUSION OF SPINE OF CERVICAL REGION: ICD-10-CM

## 2018-02-25 DIAGNOSIS — G89.29 CHRONIC NECK PAIN: ICD-10-CM

## 2018-02-26 RX ORDER — HYDROCODONE BITARTRATE AND ACETAMINOPHEN 10; 325 MG/1; MG/1
1 TABLET ORAL EVERY 6 HOURS PRN
Qty: 120 TABLET | Refills: 0 | Status: SHIPPED | OUTPATIENT
Start: 2018-02-26 | End: 2018-03-26

## 2018-02-26 RX ORDER — FENTANYL 100 UG/1
1 PATCH TRANSDERMAL
Qty: 15 PATCH | Refills: 0 | Status: SHIPPED | OUTPATIENT
Start: 2018-02-26 | End: 2018-03-26

## 2018-02-26 NOTE — TELEPHONE ENCOUNTER
HYDROcodone-acetaminophen (NORCO)  MG per tablet      Last Written Prescription Date:  01/25/18  Last Fill Quantity: 120,   # refills: 0  Last Office Visit: 11/20/17  Future Office visit:       Routing refill request to provider for review/approval because:  Drug not on the [facility], [facility] or [facility] refill protocol or controlled substance    fentaNYL (DURAGESIC) 100 mcg/hr 72 hr patch      Last Written Prescription Date:  01/25/18  Last Fill Quantity: 15 patches,   # refills: 0  Last Office Visit: 11/20/17  Future Office visit:       Routing refill request to provider for review/approval because:  Drug not on the [facility], [facility] or [facility] refill protocol or controlled substance

## 2018-03-26 ENCOUNTER — MYC REFILL (OUTPATIENT)
Dept: PULMONOLOGY | Facility: CLINIC | Age: 53
End: 2018-03-26

## 2018-03-26 ENCOUNTER — MYC REFILL (OUTPATIENT)
Dept: PEDIATRICS | Facility: CLINIC | Age: 53
End: 2018-03-26

## 2018-03-26 DIAGNOSIS — M54.2 CHRONIC NECK PAIN: ICD-10-CM

## 2018-03-26 DIAGNOSIS — Z79.891 LONG TERM CURRENT USE OF OPIATE ANALGESIC: ICD-10-CM

## 2018-03-26 DIAGNOSIS — M43.22 FUSION OF SPINE OF CERVICAL REGION: ICD-10-CM

## 2018-03-26 DIAGNOSIS — J43.9 PULMONARY EMPHYSEMA, UNSPECIFIED EMPHYSEMA TYPE (H): ICD-10-CM

## 2018-03-26 DIAGNOSIS — G89.29 CHRONIC NECK PAIN: ICD-10-CM

## 2018-03-26 NOTE — TELEPHONE ENCOUNTER
Message from MyChart:  Original authorizing provider: Maki Topete MD PhD    Jaimee Rodriguez would like a refill of the following medications:  HYDROcodone-acetaminophen (NORCO)  MG per tablet [Maki Topete MD PhD]  fentaNYL (DURAGESIC) 100 mcg/hr 72 hr patch [Maki Topete MD PhD]    Preferred pharmacy: [place] [address], [address]    Comment:

## 2018-03-26 NOTE — TELEPHONE ENCOUNTER
Routing refill request to provider for review/approval because:  Drug not on the [facility] refill protocol     HYDROcodone-acetaminophen (NORCO)  MG per tablet 120 tablet 0 2/26/2018  No   Sig: Take 1 tablet by mouth every 6 hours as needed for pain       fentaNYL (DURAGESIC) 100 mcg/hr 72 hr patch 15 patch 0 2/26/2018  No   Sig: Place 1 patch onto the skin every 48 hours (next refill      Last OV with Dr. Topete: 11/20/2017    No future apts:     Shanna Maria RN

## 2018-03-27 RX ORDER — HYDROCODONE BITARTRATE AND ACETAMINOPHEN 10; 325 MG/1; MG/1
1 TABLET ORAL EVERY 6 HOURS PRN
Qty: 120 TABLET | Refills: 0 | Status: SHIPPED | OUTPATIENT
Start: 2018-03-27 | End: 2018-04-25

## 2018-03-27 RX ORDER — FENTANYL 100 UG/1
1 PATCH TRANSDERMAL
Qty: 15 PATCH | Refills: 0 | Status: SHIPPED | OUTPATIENT
Start: 2018-03-27 | End: 2018-04-25

## 2018-03-27 NOTE — TELEPHONE ENCOUNTER
Message from [place]:  Lili Galvan RN Mon Mar 26, 2018 2:49 PM        ----- Message -----   From: Jaimee Rodriguez   Sent: 3/26/2018 2:13 PM   To: Pulmonary Nurses-  Subject: Medication Renewal Request     Original authorizing provider: David Morris Perlman, MD Donna M. Quast would like a refill of the following medications:  guaiFENesin (MUCINEX) 600 MG 12 hr tablet [David Morris Perlman, MD]    Preferred pharmacy: [facility] [place] [address]    Comment:      Medication renewals requested in this message routed to other providers:  tiZANidine (ZANAFLEX) 4 MG tablet [Maki Topete MD PhD]  traZODone (DESYREL) 100 MG tablet [Maki Topete MD PhD]

## 2018-03-27 NOTE — TELEPHONE ENCOUNTER
Requested Prescriptions   Pending Prescriptions Disp Refills     guaiFENesin (MUCINEX) 600 MG 12 hr tablet 20 tablet 0     Sig: Take 1 tablet (600 mg) by mouth 2 times daily as needed for congestion    There is no refill protocol information for this order        Encounter routed to Dr. Perlman, as medication is not on [place] medication refill protocol.    Janie MOSLEY RN, BSN  Pulmonary Care Coordinator

## 2018-04-01 ENCOUNTER — MYC REFILL (OUTPATIENT)
Dept: PULMONOLOGY | Facility: CLINIC | Age: 53
End: 2018-04-01

## 2018-04-01 DIAGNOSIS — J43.9 PULMONARY EMPHYSEMA, UNSPECIFIED EMPHYSEMA TYPE (H): ICD-10-CM

## 2018-04-01 RX ORDER — GUAIFENESIN 600 MG/1
600 TABLET, EXTENDED RELEASE ORAL 2 TIMES DAILY PRN
Qty: 20 TABLET | Refills: 0 | Status: CANCELLED | OUTPATIENT
Start: 2018-04-01

## 2018-04-02 NOTE — TELEPHONE ENCOUNTER
Message from Noe:  Lili Galvan, RN Mon Apr 2, 2018 8:50 AM        ----- Message -----   From: Jaimee Rodriguez   Sent: 4/1/2018 10:15 AM   To: Pulmonary Nurses-  Subject: Medication Renewal Request     Original authorizing provider: David Morris Perlman, MD Donna M. Quast would like a refill of the following medications:  guaiFENesin (MUCINEX) 600 MG 12 hr tablet [David Morris Perlman, MD]    Preferred pharmacy: [facility] [address] [place], [place] [id number] [place] [address]    Comment:  Could you Auth refills aso.

## 2018-04-02 NOTE — TELEPHONE ENCOUNTER
Email sent to Dr. Perlman requesting recommendations for refill request.    Janie MOSLEY RN, BSN  Pulmonary Care Coordinator

## 2018-04-02 NOTE — TELEPHONE ENCOUNTER
See encounter dated 3/26/18. This encounter closed.    Janie MOSLEY RN, BSN  Pulmonary Care Coordinator

## 2018-04-03 RX ORDER — GUAIFENESIN 600 MG/1
600 TABLET, EXTENDED RELEASE ORAL 2 TIMES DAILY PRN
Qty: 20 TABLET | Refills: 3 | Status: SHIPPED | OUTPATIENT
Start: 2018-04-03 | End: 2018-08-08

## 2018-04-03 NOTE — TELEPHONE ENCOUNTER
Received approval to refill per Dr. Perlman via email. Rx sent via e-prescribe to patient's preferred pharmacy and response sent to patient via Truviso notifying her order was sent.    Janie MOSLEY RN, BSN  Pulmonary Care Coordinator

## 2018-04-12 ENCOUNTER — MYC REFILL (OUTPATIENT)
Dept: PEDIATRICS | Facility: CLINIC | Age: 53
End: 2018-04-12

## 2018-04-12 DIAGNOSIS — G89.29 CHRONIC NECK PAIN: ICD-10-CM

## 2018-04-12 DIAGNOSIS — M54.2 CHRONIC NECK PAIN: ICD-10-CM

## 2018-04-12 DIAGNOSIS — M62.838 MUSCLE SPASTICITY: ICD-10-CM

## 2018-04-12 NOTE — TELEPHONE ENCOUNTER
Routing refill request to provider for review/approval because:  Drug not on the [facility] refill protocol     tiZANidine (ZANAFLEX) 4 MG tablet 90 tablet 5 11/15/2017  No   Sig: Take 1 tablet (4 mg) by mouth 3 times daily as needed for muscle spasms     Last OV with Dr. Topete: 11/20/2017    No future apts.     Shanna Maria RN

## 2018-04-12 NOTE — TELEPHONE ENCOUNTER
Message from Aniik[place]art:  Original authorizing provider: Maki Topete MD PhD    Jaimee Rodriguez would like a refill of the following medications:  tiZANidine (ZANAFLEX) 4 MG tablet [Maki Topete MD PhD]    Preferred pharmacy: [facility] [address] [place], [place] [id number] [place] [address]    Comment:

## 2018-04-25 ENCOUNTER — MYC REFILL (OUTPATIENT)
Dept: PEDIATRICS | Facility: CLINIC | Age: 53
End: 2018-04-25

## 2018-04-25 DIAGNOSIS — G89.29 CHRONIC NECK PAIN: ICD-10-CM

## 2018-04-25 DIAGNOSIS — M43.22 FUSION OF SPINE OF CERVICAL REGION: ICD-10-CM

## 2018-04-25 DIAGNOSIS — Z79.891 LONG TERM CURRENT USE OF OPIATE ANALGESIC: ICD-10-CM

## 2018-04-25 DIAGNOSIS — M54.2 CHRONIC NECK PAIN: ICD-10-CM

## 2018-04-25 NOTE — TELEPHONE ENCOUNTER
Routing refill request to provider for review/approval because:  Drug not on the [facility] refill protocol   **Patient is requesting Mylan fentanyl patches**    fentaNYL (DURAGESIC) 100 mcg/hr 72 hr patch 15 patch 0 3/27/2018  No   Sig: Place 1 patch onto the skin every 48 hours (next refill 12/26/17)       HYDROcodone-acetaminophen (NORCO)  MG per tablet 120 tablet 0 3/27/2018  No   Sig: Take 1 tablet by mouth every 6 hours as needed for pain     Last OV with Dr. Topete: 11/20/2017    Next 5 appointments (look out 90 days)     May 24, 2018 12:10 PM CDT   Duke Stephen with Maki Topete MD PhD   [facility] ([facility]    [address] [place] [id number]   [phone]                Shanna Maria RN

## 2018-04-25 NOTE — TELEPHONE ENCOUNTER
Message from Around Knowledgehart:  Original authorizing provider: Carlyn Helm MD    Jaimee Rodriguez would like a refill of the following medications:  HYDROcodone-acetaminophen (NORCO)  MG per tablet [Cralyn Helm MD]  fentaNYL (DURAGESIC) 100 mcg/hr 72 hr patch [Carlyn Helm MD]    Preferred pharmacy: [place] [address], [address]    Comment:  could you please request the brand MYLAN for the Fentanyl patches. I've been given other brands and they do not stay on. I've had the Mylan Fentanyl Patches before they stay on better than the rest also they will supply free of charge the patch covering.

## 2018-04-26 RX ORDER — FENTANYL 100 UG/1
1 PATCH TRANSDERMAL
Qty: 15 PATCH | Refills: 0 | Status: SHIPPED | OUTPATIENT
Start: 2018-04-26 | End: 2018-04-26

## 2018-04-26 RX ORDER — HYDROCODONE BITARTRATE AND ACETAMINOPHEN 10; 325 MG/1; MG/1
1 TABLET ORAL EVERY 6 HOURS PRN
Qty: 120 TABLET | Refills: 0 | Status: SHIPPED | OUTPATIENT
Start: 2018-04-26 | End: 2018-05-24

## 2018-05-08 DIAGNOSIS — M62.838 MUSCLE SPASTICITY: ICD-10-CM

## 2018-05-08 DIAGNOSIS — M54.2 CHRONIC NECK PAIN: ICD-10-CM

## 2018-05-08 DIAGNOSIS — G89.29 CHRONIC NECK PAIN: ICD-10-CM

## 2018-05-08 NOTE — TELEPHONE ENCOUNTER
TIZANIDINE 4MG TABLETS  Last Written Prescription Date:  04/12/2018  Last Fill Quantity: 90,  # refills: 0   Last office visit: 11/20/2017 with prescribing provider:  Last refill per Pharmacy   Future Office Visit:   Next 5 appointments (look out 90 days)     May 24, 2018  3:30 PM CDT   Duke Stephen with Maki Topete MD PhD   [facility] ([facility])    [address] [place] [id number]   [phone]

## 2018-05-09 ENCOUNTER — MYC MEDICAL ADVICE (OUTPATIENT)
Dept: PEDIATRICS | Facility: CLINIC | Age: 53
End: 2018-05-09

## 2018-05-09 DIAGNOSIS — M54.2 CERVICALGIA: Primary | ICD-10-CM

## 2018-05-10 NOTE — TELEPHONE ENCOUNTER
Routed [employer] message to PCP      Upon chart review:  Last office visit:  11/20/17  History chronic neck pain, maintained on fentanyl and Vicodin.  Her refill has been on schedule.    Due for 6 month chronic disease review May 2018 and fasting labs      Carla Lan RN,   [facility], [facility]

## 2018-05-18 ENCOUNTER — RADIANT APPOINTMENT (OUTPATIENT)
Dept: MRI IMAGING | Facility: CLINIC | Age: 53
End: 2018-05-18
Attending: INTERNAL MEDICINE
Payer: COMMERCIAL

## 2018-05-18 DIAGNOSIS — M54.2 CERVICALGIA: ICD-10-CM

## 2018-05-18 PROCEDURE — 72141 MRI NECK SPINE W/O DYE: CPT | Performed by: RADIOLOGY

## 2018-05-18 NOTE — PROGRESS NOTES
Dear Jaimee,   Here are your recent results.   -- the prior surgical area are stable.  -- you have spinal stenosis at several levels and some areas advanced.   -- I would suggest you revisit with spine surgeon to see if the other areas of spinal stenosis (outside the prior surgery) may need injection therapy or surgery.   -- let me know if you need a referral.     Please call or Mychart to our office if you have further questions.     Maki Topete MD-PhD

## 2018-05-24 ENCOUNTER — OFFICE VISIT (OUTPATIENT)
Dept: PEDIATRICS | Facility: CLINIC | Age: 53
End: 2018-05-24
Payer: COMMERCIAL

## 2018-05-24 VITALS
TEMPERATURE: 98.2 F | SYSTOLIC BLOOD PRESSURE: 166 MMHG | HEART RATE: 87 BPM | WEIGHT: 193 LBS | BODY MASS INDEX: 33.92 KG/M2 | DIASTOLIC BLOOD PRESSURE: 86 MMHG | OXYGEN SATURATION: 90 %

## 2018-05-24 DIAGNOSIS — M54.2 CHRONIC NECK PAIN: Primary | ICD-10-CM

## 2018-05-24 DIAGNOSIS — F33.0 MAJOR DEPRESSIVE DISORDER, RECURRENT EPISODE, MILD (H): ICD-10-CM

## 2018-05-24 DIAGNOSIS — F17.200 TOBACCO USE DISORDER: ICD-10-CM

## 2018-05-24 DIAGNOSIS — Z79.891 LONG TERM CURRENT USE OF OPIATE ANALGESIC: ICD-10-CM

## 2018-05-24 DIAGNOSIS — G89.29 CHRONIC NECK PAIN: Primary | ICD-10-CM

## 2018-05-24 DIAGNOSIS — I10 HYPERTENSION GOAL BP (BLOOD PRESSURE) < 140/90: ICD-10-CM

## 2018-05-24 DIAGNOSIS — E78.5 HYPERLIPIDEMIA LDL GOAL <130: ICD-10-CM

## 2018-05-24 DIAGNOSIS — M43.22 FUSION OF SPINE OF CERVICAL REGION: ICD-10-CM

## 2018-05-24 DIAGNOSIS — Z12.11 SPECIAL SCREENING FOR MALIGNANT NEOPLASMS, COLON: ICD-10-CM

## 2018-05-24 DIAGNOSIS — R07.89 CHEST TIGHTNESS: ICD-10-CM

## 2018-05-24 DIAGNOSIS — M62.838 MUSCLE SPASM: ICD-10-CM

## 2018-05-24 PROCEDURE — 93000 ELECTROCARDIOGRAM COMPLETE: CPT | Performed by: INTERNAL MEDICINE

## 2018-05-24 PROCEDURE — 99215 OFFICE O/P EST HI 40 MIN: CPT | Performed by: INTERNAL MEDICINE

## 2018-05-24 RX ORDER — LISINOPRIL/HYDROCHLOROTHIAZIDE 10-12.5 MG
1 TABLET ORAL DAILY
Qty: 30 TABLET | Refills: 0 | Status: SHIPPED | OUTPATIENT
Start: 2018-05-24 | End: 2018-06-19

## 2018-05-24 RX ORDER — FENTANYL 100 UG/1
1 PATCH TRANSDERMAL
Qty: 15 PATCH | Refills: 0 | Status: SHIPPED | OUTPATIENT
Start: 2018-05-26 | End: 2018-06-19

## 2018-05-24 RX ORDER — METHOCARBAMOL 500 MG/1
500-1000 TABLET, FILM COATED ORAL 4 TIMES DAILY PRN
Qty: 90 TABLET | Refills: 1 | Status: SHIPPED | OUTPATIENT
Start: 2018-05-24 | End: 2018-06-19

## 2018-05-24 RX ORDER — HYDROCODONE BITARTRATE AND ACETAMINOPHEN 10; 325 MG/1; MG/1
1 TABLET ORAL EVERY 6 HOURS PRN
Qty: 120 TABLET | Refills: 0 | Status: SHIPPED | OUTPATIENT
Start: 2018-05-26 | End: 2018-06-19

## 2018-05-24 RX ORDER — VENLAFAXINE HYDROCHLORIDE 37.5 MG/1
CAPSULE, EXTENDED RELEASE ORAL
Qty: 46 CAPSULE | Refills: 1 | Status: SHIPPED | OUTPATIENT
Start: 2018-05-24 | End: 2018-07-05

## 2018-05-24 NOTE — PATIENT INSTRUCTIONS
Make appointment(s) for:   -- Stop by lab to get testing kit for colon cancer screening.   -- nuclear stress test.   -- clinic follow up in 4 weeks with fasting lab        Medication(s) prescribed today:    Orders Placed This Encounter   Medications     fentaNYL (DURAGESIC) 100 mcg/hr 72 hr patch     Sig: Place 1 patch onto the skin every 48 hours (Mylan brand)     Dispense:  15 patch     Refill:  0     HYDROcodone-acetaminophen (NORCO)  MG per tablet     Sig: Take 1 tablet by mouth every 6 hours as needed for pain     Dispense:  120 tablet     Refill:  0     venlafaxine (EFFEXOR-XR) 37.5 MG 24 hr capsule     Sig: Take 1 capsule daily for 14 days, then take 2 capsules daily.     Dispense:  46 capsule     Refill:  1     lisinopril-hydrochlorothiazide (PRINZIDE/ZESTORETIC) 10-12.5 MG per tablet     Sig: Take 1 tablet by mouth daily     Dispense:  30 tablet     Refill:  0     methocarbamol (ROBAXIN) 500 MG tablet     Sig: Take 1-2 tablets (500-1,000 mg) by mouth 4 times daily as needed for muscle spasms     Dispense:  90 tablet     Refill:  1

## 2018-05-24 NOTE — MR AVS SNAPSHOT
After Visit Summary   5/24/2018    Jaimee Rodriguez    MRN: [id number]           Patient Information     Date Of Birth          1965        Visit Information        Provider Department      5/24/2018 3:30 PM Maki Topete MD PhD [facility]        Today's Diagnoses     Chronic neck pain    -  1    Fusion of spine of cervical region        Long term current use of opiate analgesic        Special screening for malignant neoplasms, colon        Major depressive disorder, recurrent episode, mild (H)        Hypertension goal BP (blood pressure) < 140/90        Hyperlipidemia LDL goal <130        Muscle spasticity        Muscle spasm        Chest tightness          Care Instructions    Make appointment(s) for:   -- Stop by lab to get testing kit for colon cancer screening.   -- nuclear stress test.   -- clinic follow up in 4 weeks with fasting lab        Medication(s) prescribed today:    Orders Placed This Encounter   Medications     fentaNYL (DURAGESIC) 100 mcg/hr 72 hr patch     Sig: Place 1 patch onto the skin every 48 hours (Mylan brand)     Dispense:  15 patch     Refill:  0     HYDROcodone-acetaminophen (NORCO)  MG per tablet     Sig: Take 1 tablet by mouth every 6 hours as needed for pain     Dispense:  120 tablet     Refill:  0     venlafaxine (EFFEXOR-XR) 37.5 MG 24 hr capsule     Sig: Take 1 capsule daily for 14 days, then take 2 capsules daily.     Dispense:  46 capsule     Refill:  1     lisinopril-hydrochlorothiazide (PRINZIDE/ZESTORETIC) 10-12.5 MG per tablet     Sig: Take 1 tablet by mouth daily     Dispense:  30 tablet     Refill:  0     methocarbamol (ROBAXIN) 500 MG tablet     Sig: Take 1-2 tablets (500-1,000 mg) by mouth 4 times daily as needed for muscle spasms     Dispense:  90 tablet     Refill:  1                   Follow-ups after your visit        Your next 10 appointments already scheduled     Feb 07, 2019  1:00 PM CST   Office Visit with PFT LAB   [facility] ([facility])    [address] [place] [id number] [phone]           Bring a current list of meds and any records pertaining to this visit. For Physicals, please bring immunization records and any forms needing to be filled out. Please arrive 10 minutes early to complete paperwork.            Feb 07, 2019  2:00 PM CST   Return Visit with David Morris Perlman, MD   [facility] ([facility])    [address] [place] [phone] [phone]              Future tests that were ordered for you today     Open Future Orders        Priority Expected Expires Ordered    NM Exercise stress test Routine  5/24/2019 5/24/2018    CBC with platelets Routine  7/31/2018 5/24/2018    Basic metabolic panel Routine  7/31/2018 5/24/2018    Lipid panel Routine  7/31/2018 5/24/2018    Albumin Random Urine Quantitative with Creat Ratio Routine  7/31/2018 5/24/2018    Fecal colorectal cancer screen (FIT) Routine 6/14/2018 8/16/2018 5/24/2018            Who to contact     If you have questions or need follow up information about today's clinic visit or your schedule please contact [facility] directly at [phone].  Normal or non-critical lab and imaging results will be communicated to you by Blosonhart, letter or phone within 4 business days after the clinic has received the results. If you do not hear from us within 7 days, please contact the clinic through Blosonhart or phone. If you have a critical or abnormal lab result, we will notify you by phone as soon as possible.  Submit refill requests through Vtrim or call your pharmacy and they will forward the refill request to us. Please allow 3 business days for your refill to be completed.          Additional Information About Your Visit        Vtrim Information     Vtrim gives you secure access to your electronic health record. If you see a primary care provider,  you can also send messages to your care team and make appointments. If you have questions, please call your primary care clinic.  If you do not have a primary care provider, please call [phone] and they will assist you.      [employer] is an electronic gateway that provides easy, online access to your medical records. With [employer], you can request a clinic appointment, read your test results, renew a prescription or communicate with your care team.     To access your existing account, please contact your [facility] or call [phone] for assistance.        Care EveryWhere ID     This is your Care EveryWhere ID. This could be used by other organizations to access your [facility] medical records  [id number]        Your Vitals Were     Pulse Temperature Pulse Oximetry BMI (Body Mass Index)          87 98.2  F (36.8  C) (Temporal) 90% 33.92 kg/m2         Blood Pressure from Last 3 Encounters:   05/24/18 166/86   02/22/18 142/85   11/20/17 142/86    Weight from Last 3 Encounters:   05/24/18 193 lb (87.5 kg)   02/22/18 183 lb 3.2 oz (83.1 kg)   11/20/17 186 lb 14.4 oz (84.8 kg)              We Performed the Following     EKG 12-lead complete w/read - Clinics          Today's Medication Changes          These changes are accurate as of 5/24/18  4:52 PM.  If you have any questions, ask your nurse or doctor.               Start taking these medicines.        Dose/Directions    lisinopril-hydrochlorothiazide 10-12.5 MG per tablet   Commonly known as:  PRINZIDE/ZESTORETIC   Used for:  Hypertension goal BP (blood pressure) < 140/90   Started by:  Maki Topete MD PhD        Dose:  1 tablet   Take 1 tablet by mouth daily   Quantity:  30 tablet   Refills:  0       methocarbamol 500 MG tablet   Commonly known as:  ROBAXIN   Used for:  Chronic neck pain, Muscle spasticity   Replaces:  tiZANidine 4 MG tablet   Started by:  Maki Topete MD PhD        Dose:  500-1000 mg   Take 1-2 tablets (500-1,000 mg)  by mouth 4 times daily as needed for muscle spasms   Quantity:  90 tablet   Refills:  1       venlafaxine 37.5 MG 24 hr capsule   Commonly known as:  EFFEXOR-XR   Used for:  Major depressive disorder, recurrent episode, mild (H)   Started by:  Maki Topete MD PhD        Take 1 capsule daily for 14 days, then take 2 capsules daily.   Quantity:  46 capsule   Refills:  1         These medicines have changed or have updated prescriptions.        Dose/Directions    HYDROcodone-acetaminophen  MG per tablet   Commonly known as:  NORCO   This may have changed:  These instructions start on 5/26/2018. If you are unsure what to do until then, ask your doctor or other care provider.   Used for:  Chronic neck pain, Fusion of spine of cervical region, Long term current use of opiate analgesic   Changed by:  Maki Topete MD PhD        Dose:  1 tablet   Start taking on:  5/26/2018   Take 1 tablet by mouth every 6 hours as needed for pain   Quantity:  120 tablet   Refills:  0         Stop taking these medicines if you haven't already. Please contact your care team if you have questions.     tiZANidine 4 MG tablet   Commonly known as:  ZANAFLEX   Replaced by:  methocarbamol 500 MG tablet   Stopped by:  Maki Topete MD PhD                Where to get your medicines      These medications were sent to [facility] [address] [place] [id number]     Phone:  [phone]     lisinopril-hydrochlorothiazide 10-12.5 MG per tablet    methocarbamol 500 MG tablet    venlafaxine 37.5 MG 24 hr capsule         Some of these will need a paper prescription and others can be bought over the counter.  Ask your nurse if you have questions.     Bring a paper prescription for each of these medications     fentaNYL 100 mcg/hr 72 hr patch    HYDROcodone-acetaminophen  MG per tablet               Information about OPIOIDS     PRESCRIPTION OPIOIDS: WHAT YOU NEED TO KNOW   You  have a prescription for an opioid (narcotic) pain medicine. Opioids can cause addiction. If you have a history of chemical dependency of any type, you are at a higher risk of becoming addicted to opioids. Only take this medicine after all other options have been tried. Take it for as short a time and as few doses as possible.     Do not:    Drive. If you drive while taking these medicines, you could be arrested for driving under the influence (DUI).    Operate heavy machinery    Do any other dangerous activities while taking these medicines.     Drink any alcohol while taking these medicines.      Take with any other medicines that contain acetaminophen. Read all labels carefully. Look for the word  acetaminophen  or  Tylenol.  Ask your pharmacist if you have questions or are unsure.    Store your pills in a secure place, locked if possible. We will not replace any lost or stolen medicine. If you don t finish your medicine, please throw away (dispose) as directed by your pharmacist. The [place] Pollution Control Agency has more information about safe disposal: https://www.pca.[facility].[place].us/living-green/managing-unwanted-medications    All opioids tend to cause constipation. Drink plenty of water and eat foods that have a lot of fiber, such as fruits, vegetables, prune juice, apple juice and high-fiber cereal. Take a laxative (Miralax, milk of magnesia, Colace, Senna) if you don t move your bowels at least every other day.          Primary Care Provider Office Phone # Fax #    Maki Topete MD PhD [phone]       [address]  [facility] [address]        Equal Access to Services     EWEL[facility] Hospital[facility] : Hadii aad ku hadasho Soluzali, waaxda luqadaha, qaybta kaalmada mitul, morris lay . So [facility] [phone].    ATENCIÓN: Si habla español, tiene a pepe disposición servicios gratuitos de asistencia lingüística. Llame al [phone].    We comply with applicable federal civil  rights laws and [place] laws. We do not discriminate on the basis of race, color, national origin, age, disability, sex, sexual orientation, or gender identity.            Thank you!     Thank you for choosing [facility]  for your care. Our goal is always to provide you with excellent care. Hearing back from our patients is one way we can continue to improve our services. Please take a few minutes to complete the written survey that you may receive in the mail after your visit with us. Thank you!             Your Updated Medication List - Protect others around you: Learn how to safely use, store and throw away your medicines at www.disposemymeds.org.          This list is accurate as of 5/24/18  4:52 PM.  Always use your most recent med list.                   Brand Name Dispense Instructions for use Diagnosis    albuterol 108 (90 Base) MCG/ACT Inhaler    PROAIR HFA/PROVENTIL HFA/VENTOLIN HFA    1 Inhaler    Inhale 2 puffs into the lungs every 6 hours as needed for shortness of breath / dyspnea or wheezing    Obstructive chronic bronchitis with exacerbation (H)       fentaNYL 100 mcg/hr 72 hr patch   Start taking on:  5/26/2018    DURAGESIC    15 patch    Place 1 patch onto the skin every 48 hours (Mylan brand)    Chronic neck pain, Fusion of spine of cervical region, Long term current use of opiate analgesic       guaiFENesin 600 MG 12 hr tablet    MUCINEX    20 tablet    Take 1 tablet (600 mg) by mouth 2 times daily as needed for congestion    Pulmonary emphysema, unspecified emphysema type (H)       HYDROcodone-acetaminophen  MG per tablet   Start taking on:  5/26/2018    NORCO    120 tablet    Take 1 tablet by mouth every 6 hours as needed for pain    Chronic neck pain, Fusion of spine of cervical region, Long term current use of opiate analgesic       lisinopril-hydrochlorothiazide 10-12.5 MG per tablet    PRINZIDE/ZESTORETIC    30 tablet    Take 1 tablet by mouth daily    Hypertension  goal BP (blood pressure) < 140/90       methocarbamol 500 MG tablet    ROBAXIN    90 tablet    Take 1-2 tablets (500-1,000 mg) by mouth 4 times daily as needed for muscle spasms    Chronic neck pain, Muscle spasticity       mometasone-formoterol 200-5 MCG/ACT oral inhaler    DULERA    1 Inhaler    Inhale 2 puffs into the lungs 2 times daily    Pulmonary emphysema, unspecified emphysema type (H)       order for DME     1 Device    Equipment being ordered: Oxygen 4 lpm via nasal cannula continuous flow with any exertion and 2 lpm continuous flow with nasal cannula at rest and at night. Provide portability.  Length of need 99.    COPD (chronic obstructive pulmonary disease) (H), Dyspnea       order for DME     1 Device    Equipment being ordered: Superfeet - Blue - Size C    Plantar fasciitis       polyethylene glycol powder    MIRALAX/GLYCOLAX    527 g    MIX 17 GRAMS(ONE CAPFUL) IN LIQUID AND DRINK ONCE DAILY AS NEEDED FOR CONSTIPATION    Drug-induced constipation       tiotropium 2.5 MCG/ACT inhalation aerosol    SPIRIVA RESPIMAT    12 g    Inhale 2 puffs into the lungs daily    Pulmonary emphysema, unspecified emphysema type (H)       traZODone 100 MG tablet    DESYREL    60 tablet    Take 1.5-2 tablets (150-200 mg) by mouth nightly as needed for sleep    Chronic insomnia       venlafaxine 37.5 MG 24 hr capsule    EFFEXOR-XR    46 capsule    Take 1 capsule daily for 14 days, then take 2 capsules daily.    Major depressive disorder, recurrent episode, mild (H)       zolpidem 6.25 MG CR tablet    AMBIEN CR    30 tablet    TAKE 1 TABLET BY MOUTH NIGHTLY AS NEEDED FOR SLEEP    Drug-induced constipation

## 2018-05-24 NOTE — PROGRESS NOTES
SUBJECTIVE:   Jaimee Rodriguez is a 52 year old female who presents to clinic today for the following health issues:    Jaimee has Chronic neck pain; Major depression in partial remission; COPD, severe (H); and O2 dependent on her pertinent problem list.     Medication Followup of All meds, Neck pain    Taking Medication as prescribed: yes    Side Effects:  None    Medication Helping Symptoms:  yes     Patient is here with multiple complaints.   1. History of chronic neck pain, s/p cervical spine fusion surgery x 2. Maintained on long term Fentanyl patch and vicodin. Six weeks ago, she woke up and moved her neck the wrong way it really hurt. The left muscle on top of the shoulder tightens up really bad. It is the same symptoms that led to the fusion of the cervical spine except the symptoms were on the left prior to surgery. Her left hand and fingeres fall asleep or numb and tingling. This is not new but more often and more persistent. It hurts to move neck toward chest or to tilt to the right side. We had an MRI done last week. There is some progression of arthritis in the cervical spine and stenosis is worse. Pt has now made an appointment with the surgeon who did the second neck surgery next week. She is staying with the current narcotic dose but would like a muscle relaxer to help the left neck spasm.   2. History of depression. Didn't want to take antidepressant for a long time but now feels like she needs one. The pain is making her more depressed. Tried duloxetine in the past and wellbutrin. Not sure that they helped too much.   3. Lately has noticed more chest tightness and more dyspnea on exertion. History of severe COPD. Never needed inhaler although she was prescribed Dulera, Spiriva, and Albuterol by her pulmonologist. She had a check up 3 months ago, told she was fine. She started using Albuterol last week. She thinks it relieves only slightly. Now she sits around and doesn't do much. Denies chest pain.    4. History of HTN: previously has always attributed it to pain related and didn't want to take BP meds. Recent eye exam showed evidence of HTN related changes in the eye. Pt is ready to take medication for BP.   5. History of hyperlipidemia, not on statin. Has not been checked since 2016.     Positive weight gain. Pt attributed this to not active and poor eating.     SH: still smoking. Not able to quit yet. Has cut down than before.   FH: stroke in mother but no CAD.     ROS:  Constitutional, HEENT, cardiovascular, pulmonary, gi and gu systems are negative, except as otherwise noted.         Current Outpatient Prescriptions on File Prior to Visit:  guaiFENesin (MUCINEX) 600 MG 12 hr tablet Take 1 tablet (600 mg) by mouth 2 times daily as needed for congestion   mometasone-formoterol (DULERA) 200-5 MCG/ACT oral inhaler Inhale 2 puffs into the lungs 2 times daily   tiotropium (SPIRIVA RESPIMAT) 2.5 MCG/ACT inhalation aerosol Inhale 2 puffs into the lungs daily   traZODone (DESYREL) 100 MG tablet Take 1.5-2 tablets (150-200 mg) by mouth nightly as needed for sleep   zolpidem (AMBIEN CR) 6.25 MG CR tablet TAKE 1 TABLET BY MOUTH NIGHTLY AS NEEDED FOR SLEEP   albuterol (PROAIR HFA/PROVENTIL HFA/VENTOLIN HFA) 108 (90 BASE) MCG/ACT Inhaler Inhale 2 puffs into the lungs every 6 hours as needed for shortness of breath / dyspnea or wheezing   order for DME Equipment being ordered: [facility] - Blue - Size C   ORDER FOR DME Equipment being ordered: Oxygen 4 lpm via nasal cannula continuous flow with any exertion and 2 lpm continuous flow with nasal cannula at rest and at night. Provide portability.  Length of need 99.   polyethylene glycol (MIRALAX/GLYCOLAX) powder MIX 17 GRAMS(ONE CAPFUL) IN LIQUID AND DRINK ONCE DAILY AS NEEDED FOR CONSTIPATION   [DISCONTINUED] fluticasone-salmeterol (ADVAIR) 500-50 MCG/DOSE diskus inhaler Inhale 1 puff into the lungs 2 times daily     No current facility-administered medications on file  prior to visit.        Patient Active Problem List   Diagnosis     Other acne     Chronic neck pain     Obesity     Major depression in partial remission     Fusion of spine of cervical region     CARDIOVASCULAR SCREENING; LDL GOAL LESS THAN 160     Depression with anxiety     Emphysema lung (H)     COPD, severe (H)     Former smoker     Chronic insomnia     Hypoxia     O2 dependent     Tobacco use disorder     Past Surgical History:   Procedure Laterality Date     C APPENDECTOMY       C  DELIVERY ONLY  ,,    , Low Cervical     C LIGATE FALLOPIAN TUBE      S/P tubal ligation     C TOTAL ABDOM HYSTERECTOMY      not total     pt did not have ovaries removed     HC ENLARGE BREAST WITH IMPLANT       SURGICAL HISTORY OF -   10/18/2005    C6 corpectomy with iliac crest autograft fixation. [facility]       Social History   Substance Use Topics     Smoking status: Current Every Day Smoker     Packs/day: 0.75     Years: 35.00     Types: Cigarettes     Smokeless tobacco: Never Used      Comment: started age 14.     Alcohol use 0.0 oz/week     0 Standard drinks or equivalent per week      Comment: occ     Family History   Problem Relation Age of Onset     CEREBROVASCULAR DISEASE Mother      Chronic Obstructive Pulmonary Disease Mother      Arthritis Father      DIABETES Maternal Grandmother      CANCER Maternal Aunt      breast             Problem list, Medication list, Allergies, and Medical/Social/Surgical histories reviewed in [facility] and updated as appropriate.    OBJECTIVE:                                                    /86  Pulse 87  Temp 98.2  F (36.8  C) (Temporal)  Wt 193 lb (87.5 kg)  SpO2 90%  BMI 33.92 kg/m2    GENERAL: healthy, alert and no distress  HEENT: unremarkable  Neck: no adenopathy/mass/stiffness. Thyroid normal.  Lung: clear, no wheezing/rhonchi/crackles  Heart: RRR, normal S1/2, no murmur/gallop/rup  Abd: soft, normal BS, non tender, no  organomegaly   Ext: no cyanosis/clubbing/edema      Diagnostic test results:  Results for orders placed or performed in visit on 05/18/18   MR Cervical Spine w/o Contrast    Narrative    MR CERVICAL SPINE W/O CONTRAST 5/18/2018 12:04 PM    Provided History: Acute worsening of chronic neck pain, history of  cervical spine surgery; Cervicalgia  ICD-10: Cervicalgia    Comparison: Cervical spine MRI 1/22/2015 and cervical spine radiograph  1/19/2007    Technique: Sagittal T1-weighted, sagittal T2-weighted, 3-D T1  weighted, sagittal STIR,  axial T2-weighted, and axial T2* gradient  echo images of the cervical spine were obtained without intravenous  contrast. Oblique sagittal reconstructed images were created from the  original source data.    Findings:     Stable approximately 3 mm degenerative anterolisthesis of C3 on C4.  Reversal the normal cervical lordosis centered at C4-5. Post surgical  changes of C6 corpectomy, C5-C7 anterior cervical plate and screw  fixation and posterior approach lateral mass screws with  interconnecting rods. New periarticular marrow edema about the left  C2-3 facet joint, suggestive of active arthropathy. No fracture or  destructive bone lesion. Normal cord signal. Mild symmetric  paraspinous muscular atrophy.    Findings on a level by level basis are as follows:    C2-3: Asymmetric left advanced facet degeneration. Moderate to  advanced left and mild right neural foraminal stenosis.    C3-4: Asymmetric left advanced facet degeneration. Disc osteophyte  complex and uncinate spurring. Moderate to advanced left and moderate  right neural foraminal stenosis. Moderate spinal canal stenosis.    C4-5: Disc osteophyte complex abuts the ventral margin of the cord.  Mild spinal canal stenosis. No significant neural foraminal stenosis.    C5-6: Surgically decompressed spinal canal. No spinal canal or neural  foraminal stenosis.    C6-7: Surgically decompressed spinal canal. No spinal canal or  neural  foraminal stenosis.    C7-T1: No spinal canal or neural foraminal stenosis.      Impression    Impression:   1. Since 1/22/2015, new left C2-3 facet joint active arthropathy.  2. Stable multilevel cervical spondylosis with degenerative  spondylolisthesis of C3 on C4 and kyphotic angulation centered at  C4-5.   3. Moderate spinal canal stenosis at C3-4.  4. Moderate to advanced neural foraminal stenosis on the left C2-C4.  Moderate right neural foraminal stenosis at C3-4.  5. Stable postsurgical changes of C5-7 instrumented spinal fusion with  wide spinal canal decompression.  I have personally reviewed the examination and initial interpretation  and I agree with the findings.    ADRIEL BORGES MD         ASSESSMENT/PLAN:                                                      52 year old female with the following diagnoses and treatment plan:      ICD-10-CM    1. Chronic neck pain M54.2 fentaNYL (DURAGESIC) 100 mcg/hr 72 hr patch    G89.29 HYDROcodone-acetaminophen (NORCO)  MG per tablet     methocarbamol (ROBAXIN) 500 MG tablet     PROLONGED SERV,OFFICE,1ST HR   2. Fusion of spine of cervical region M43.22 fentaNYL (DURAGESIC) 100 mcg/hr 72 hr patch     HYDROcodone-acetaminophen (NORCO)  MG per tablet     PROLONGED SERV,OFFICE,1ST HR   3. Long term current use of opiate analgesic Z79.891 fentaNYL (DURAGESIC) 100 mcg/hr 72 hr patch     HYDROcodone-acetaminophen (NORCO)  MG per tablet     PROLONGED SERV,OFFICE,1ST HR   4. Special screening for malignant neoplasms, colon Z12.11 Fecal colorectal cancer screen (FIT)     PROLONGED SERV,OFFICE,1ST HR   5. Major depressive disorder, recurrent episode, mild (H) F33.0 venlafaxine (EFFEXOR-XR) 37.5 MG 24 hr capsule     PROLONGED SERV,OFFICE,1ST HR   6. Hypertension goal BP (blood pressure) < 140/90 I10 lisinopril-hydrochlorothiazide (PRINZIDE/ZESTORETIC) 10-12.5 MG per tablet     CBC with platelets     Basic metabolic panel     Albumin Random Urine  Quantitative with Creat Ratio     PROLONGED SERV,OFFICE,1ST HR   7. Hyperlipidemia LDL goal <130 E78.5 Lipid panel     PROLONGED SERV,OFFICE,1ST HR   8. Muscle spasm M62.838 methocarbamol (ROBAXIN) 500 MG tablet     PROLONGED SERV,OFFICE,1ST HR   9. Chest tightness R07.89 EKG 12-lead complete w/read - Clinics     NM Exercise stress test     PROLONGED SERV,OFFICE,1ST HR   10. Tobacco use disorder F17.200        -- patient with multiple complaints.   -- CHest tightness: COPD vs cardiac. ECG today is normal. Still smoking. Will obtain stress test. In the meantime she may continue with her Albuterol and go back on Dulera and Spiriva.   -- HTN: start lisinopril/hydrochlorothiazide    -- depression: Effexor, this may help the pain some.   -- chronic neck pain: continue current narcotics. Gave methocarbomol for muscle spasm.   -- due for labs and FIT testing.  -- return in 4 weeks to recheck BP and depression.     Will call or return to clinic if worsening or symptoms not improving as discussed.  See Patient Instructions.      A total of 55 minutes was spent face to face with this patient. More than 50% of the time was spent on education for the above problems and management plans.       Maki Topete MD-PhD  [facility] [place]    (Note: Chart documentation was done in part with Dragon Voice Recognition software. Although reviewed after completion, some word and grammatical errors may remain.)

## 2018-05-25 PROBLEM — F17.200 TOBACCO USE DISORDER: Status: ACTIVE | Noted: 2018-05-25

## 2018-05-29 ASSESSMENT — ANXIETY QUESTIONNAIRES
7. FEELING AFRAID AS IF SOMETHING AWFUL MIGHT HAPPEN: NOT AT ALL
2. NOT BEING ABLE TO STOP OR CONTROL WORRYING: NOT AT ALL
6. BECOMING EASILY ANNOYED OR IRRITABLE: NOT AT ALL
3. WORRYING TOO MUCH ABOUT DIFFERENT THINGS: NOT AT ALL
5. BEING SO RESTLESS THAT IT IS HARD TO SIT STILL: NOT AT ALL
GAD7 TOTAL SCORE: 2
1. FEELING NERVOUS, ANXIOUS, OR ON EDGE: NOT AT ALL

## 2018-05-29 ASSESSMENT — PATIENT HEALTH QUESTIONNAIRE - PHQ9: 5. POOR APPETITE OR OVEREATING: MORE THAN HALF THE DAYS

## 2018-05-30 ENCOUNTER — TELEPHONE (OUTPATIENT)
Dept: PEDIATRICS | Facility: CLINIC | Age: 53
End: 2018-05-30

## 2018-05-30 ASSESSMENT — ANXIETY QUESTIONNAIRES: GAD7 TOTAL SCORE: 2

## 2018-05-30 ASSESSMENT — PATIENT HEALTH QUESTIONNAIRE - PHQ9: SUM OF ALL RESPONSES TO PHQ QUESTIONS 1-9: 13

## 2018-05-30 NOTE — TELEPHONE ENCOUNTER
Spoke with patient who stated she would call back to schedule.    Patient needs NM Stress Test   Fasting labs and Return visit with Dr Topete around 6/21/18    Angeli Gastelum  Primary Care   [facility]

## 2018-06-06 DIAGNOSIS — M62.838 MUSCLE SPASM: Primary | ICD-10-CM

## 2018-06-06 NOTE — TELEPHONE ENCOUNTER
TIZANIDINE 4MG TABLETS  Last Written Prescription Date:  05/08/2018  Last Fill Quantity: 90 Tablets,  # refills: 0   Last office visit: 5/24/2018 with prescribing provider:    Last refill per Pharmacy   Future Office Visit:

## 2018-06-07 ENCOUNTER — TRANSFERRED RECORDS (OUTPATIENT)
Dept: HEALTH INFORMATION MANAGEMENT | Facility: CLINIC | Age: 53
End: 2018-06-07

## 2018-06-07 NOTE — TELEPHONE ENCOUNTER
tiZANidine (ZANAFLEX) 4 MG tablet (Discontinued) 90 tablet 0 5/10/2018 5/24/2018    Sig - Route: Take 1 tablet (4 mg) by mouth 3 times daily as needed for muscle spasms - Oral    Class: E-Prescribe    Reason for Discontinue: Reorder      Last OV with Dr. Topete: 5/24/2018    No future apts.     Shanna Maria RN

## 2018-06-14 ENCOUNTER — TELEPHONE (OUTPATIENT)
Dept: CARDIOLOGY | Facility: CLINIC | Age: 53
End: 2018-06-14

## 2018-06-15 ENCOUNTER — OFFICE VISIT (OUTPATIENT)
Dept: CARDIOLOGY | Facility: CLINIC | Age: 53
End: 2018-06-15
Attending: INTERNAL MEDICINE
Payer: COMMERCIAL

## 2018-06-15 ENCOUNTER — RADIANT APPOINTMENT (OUTPATIENT)
Dept: NUCLEAR MEDICINE | Facility: CLINIC | Age: 53
End: 2018-06-15
Attending: INTERNAL MEDICINE
Payer: COMMERCIAL

## 2018-06-15 DIAGNOSIS — I10 HYPERTENSION GOAL BP (BLOOD PRESSURE) < 140/90: ICD-10-CM

## 2018-06-15 DIAGNOSIS — E78.5 HYPERLIPIDEMIA LDL GOAL <130: ICD-10-CM

## 2018-06-15 DIAGNOSIS — R07.89 CHEST TIGHTNESS: ICD-10-CM

## 2018-06-15 DIAGNOSIS — R07.9 CHEST PAIN: Primary | ICD-10-CM

## 2018-06-15 LAB
ANION GAP SERPL CALCULATED.3IONS-SCNC: 6 MMOL/L (ref 3–14)
BUN SERPL-MCNC: 28 MG/DL (ref 7–30)
CALCIUM SERPL-MCNC: 9.3 MG/DL (ref 8.5–10.1)
CHLORIDE SERPL-SCNC: 102 MMOL/L (ref 94–109)
CHOLEST SERPL-MCNC: 165 MG/DL
CO2 SERPL-SCNC: 32 MMOL/L (ref 20–32)
CREAT SERPL-MCNC: 1 MG/DL (ref 0.52–1.04)
CREAT UR-MCNC: 57 MG/DL
ERYTHROCYTE [DISTWIDTH] IN BLOOD BY AUTOMATED COUNT: 13.2 % (ref 10–15)
GFR SERPL CREATININE-BSD FRML MDRD: 58 ML/MIN/1.7M2
GLUCOSE SERPL-MCNC: 87 MG/DL (ref 70–99)
HCT VFR BLD AUTO: 41.4 % (ref 35–47)
HDLC SERPL-MCNC: 53 MG/DL
HGB BLD-MCNC: 13.3 G/DL (ref 11.7–15.7)
LDLC SERPL CALC-MCNC: 85 MG/DL
MCH RBC QN AUTO: 29.4 PG (ref 26.5–33)
MCHC RBC AUTO-ENTMCNC: 32.1 G/DL (ref 31.5–36.5)
MCV RBC AUTO: 91 FL (ref 78–100)
MICROALBUMIN UR-MCNC: 10 MG/L
MICROALBUMIN/CREAT UR: 17.63 MG/G CR (ref 0–25)
NONHDLC SERPL-MCNC: 112 MG/DL
PLATELET # BLD AUTO: 294 10E9/L (ref 150–450)
POTASSIUM SERPL-SCNC: 4 MMOL/L (ref 3.4–5.3)
RBC # BLD AUTO: 4.53 10E12/L (ref 3.8–5.2)
SODIUM SERPL-SCNC: 140 MMOL/L (ref 133–144)
TRIGL SERPL-MCNC: 137 MG/DL
WBC # BLD AUTO: 11.6 10E9/L (ref 4–11)

## 2018-06-15 PROCEDURE — 85027 COMPLETE CBC AUTOMATED: CPT | Performed by: INTERNAL MEDICINE

## 2018-06-15 PROCEDURE — 82043 UR ALBUMIN QUANTITATIVE: CPT | Performed by: INTERNAL MEDICINE

## 2018-06-15 PROCEDURE — 93017 CV STRESS TEST TRACING ONLY: CPT

## 2018-06-15 PROCEDURE — 80048 BASIC METABOLIC PNL TOTAL CA: CPT | Performed by: INTERNAL MEDICINE

## 2018-06-15 PROCEDURE — A9502 TC99M TETROFOSMIN: HCPCS | Performed by: INTERNAL MEDICINE

## 2018-06-15 PROCEDURE — 78452 HT MUSCLE IMAGE SPECT MULT: CPT | Performed by: RADIOLOGY

## 2018-06-15 PROCEDURE — 93016 CV STRESS TEST SUPVJ ONLY: CPT

## 2018-06-15 PROCEDURE — 36415 COLL VENOUS BLD VENIPUNCTURE: CPT | Performed by: INTERNAL MEDICINE

## 2018-06-15 PROCEDURE — 80061 LIPID PANEL: CPT | Performed by: INTERNAL MEDICINE

## 2018-06-15 NOTE — PROGRESS NOTES
Patient was ordered a nuclear exercise stress test, however, patient could only pedal 1 minute and 50 seconds before she became extremely SOB.  Pt had a difficult time recovering, however, after 10 minutes, the patient recovered.  Test was switched to a nuclear  per cardiologist.      Patient presents today for a Nuclear Dobutamine Stress Test ordered by MD.  Prior to patient visit, chart prep done by RUSLAN included confirmation of order accuracy, reviewed meds for contraindication, reviewed previous EKG's  for trends & concerns, and reviewed medical history.     Patient prepped for procedure by applying 12 lead, blood pressure cuff for continuous monitoring and SP02.  IV site was flushed with normal saline.      Patient was hooked up to pump & the Dobutamine infusion was started at 10 mcg's. The Dobutamine was increased to 20, then 30, then 40 mcg's.  Blood pressure was taken every 2 minutes during the infusion and documented in the Muse system.  Once target heart rate was achieved, the Nuclear Tech injected the Myoview tracer through the IV site.  The infusion was then stopped as the test was completed.       Patient offered C/O: NONE         Dobutamine NDC# 0377-3220-31  Total dose of Atropine was NONEmg's.          Atropine NDC# 78840-832-83  Total dose of Metoprolol was NONEmg's. Metoprolol NDC# [address]-10    After completion of infusion, patient was monitored in the recovery period with, EKG, blood pressure and 02 monitoring for 20 minutes.  Patient education provided by ANASTASIYA Flaherty CMA about cardiology interpretation and how primary provider will be notified of results.    Once stable, the patient was unhooked from all monitoring devices and taken back to Nuclear for the 2nd set of pictures.      ANASTASIYA Garza CMA Dr. Nijjar provided supervision of the tests performed today

## 2018-06-15 NOTE — MR AVS SNAPSHOT
MRN:[id number]                      After Visit Summary   6/15/2018    Jaimee Rodriguez    MRN: [id number]           Visit Information        Provider Department      6/15/2018 10:45 AM MG CV TECH; MG CARD; MG IMAGING NURSE; MG STRESS RM [facility]        Your next 10 appointments already scheduled     Jul 05, 2018  4:50 PM CDT   Return Visit with Maki Topete MD PhD   [facility])    [address] [place] [id number] [phone]            Feb 07, 2019  1:00 PM CST   Office Visit with PFT LAB   [facility])    [address] [place] [id number] [phone]           Bring a current list of meds and any records pertaining to this visit. For Physicals, please bring immunization records and any forms needing to be filled out. Please arrive 10 minutes early to complete paperwork.            Feb 07, 2019  2:00 PM CST   Return Visit with David Morris Perlman, MD   [facility])    [address] [place] [id number] [phone]              [facility] Information     [employer] gives you secure access to your electronic health record. If you see a primary care provider, you can also send messages to your care team and make appointments. If you have questions, please call your primary care clinic.  If you do not have a primary care provider, please call [phone] and they will assist you.      [employer] is an electronic gateway that provides easy, online access to your medical records. With [employer], you can request a clinic appointment, read your test results, renew a prescription or communicate with your care team.     To access your existing account, please contact your [facility] or call [phone] for assistance.        Care EveryWhere ID     This is your Care EveryWhere ID. This  could be used by other organizations to access your [facility] medical records  [id number]        Equal Access to Services     LEOBARDO PANDA : Hadii pat Christine, madeleine ravi, teddy bauman, morris cameron. So [facility] [phone].    ATENCIÓN: Si habla español, tiene a pepe disposición servicios gratuitos de asistencia lingüística. Llame al [phone].    We comply with applicable federal civil rights laws and [place] laws. We do not discriminate on the basis of race, color, national origin, age, disability, sex, sexual orientation, or gender identity.

## 2018-06-18 NOTE — PROGRESS NOTES
Dear Jaimee,   Here are your recent results which are within the expected range.  Please continue with your current plan of care.     Please call or Mychart to our office if you have further questions.     Maki Topete MD-PhD

## 2018-06-19 ENCOUNTER — MYC REFILL (OUTPATIENT)
Dept: PEDIATRICS | Facility: CLINIC | Age: 53
End: 2018-06-19

## 2018-06-19 DIAGNOSIS — M54.2 CHRONIC NECK PAIN: ICD-10-CM

## 2018-06-19 DIAGNOSIS — M43.22 FUSION OF SPINE OF CERVICAL REGION: ICD-10-CM

## 2018-06-19 DIAGNOSIS — Z79.891 LONG TERM CURRENT USE OF OPIATE ANALGESIC: ICD-10-CM

## 2018-06-19 DIAGNOSIS — G89.29 CHRONIC NECK PAIN: ICD-10-CM

## 2018-06-19 DIAGNOSIS — I10 HYPERTENSION GOAL BP (BLOOD PRESSURE) < 140/90: ICD-10-CM

## 2018-06-19 DIAGNOSIS — M62.838 MUSCLE SPASM: ICD-10-CM

## 2018-06-19 RX ORDER — LISINOPRIL/HYDROCHLOROTHIAZIDE 10-12.5 MG
1 TABLET ORAL DAILY
Qty: 30 TABLET | Refills: 0 | Status: CANCELLED | OUTPATIENT
Start: 2018-06-19

## 2018-06-19 RX ORDER — LISINOPRIL/HYDROCHLOROTHIAZIDE 10-12.5 MG
1 TABLET ORAL DAILY
Qty: 30 TABLET | Refills: 0 | Status: SHIPPED | OUTPATIENT
Start: 2018-06-19 | End: 2018-07-05

## 2018-06-19 NOTE — TELEPHONE ENCOUNTER
Message from [facility]:  Original authorizing provider: Maki Topete MD PhD    Jaimee Rodriguez would like a refill of the following medications:  fentaNYL (DURAGESIC) 100 mcg/hr 72 hr patch [Maki Topete MD PhD]  HYDROcodone-acetaminophen (NORCO)  MG per tablet [Maki Topete MD PhD]    Preferred pharmacy: WRITTEN PRESCRIPTION REQUESTED    Comment:  My  Steve Rodriguez will be picking up the written scripts when they are ready. I will be filling them at [facility]

## 2018-06-19 NOTE — TELEPHONE ENCOUNTER
Routing refill request to provider for review/approval because:  Drug not on the [facility] refill protocol   **Patient wishes to  the scripts - her  will be the one picking them up    fentaNYL (DURAGESIC) 100 mcg/hr 72 hr patch 15 patch 0 5/26/2018  No   Sig - Route: Place 1 patch onto the skin every 48 hours (Mylan brand) - Transdermal     HYDROcodone-acetaminophen (NORCO)  MG per tablet 120 tablet 0 5/26/2018  No   Sig - Route: Take 1 tablet by mouth every 6 hours as needed for pain - Oral     Last OV with Dr. Topete: 5/24/2018    Next 5 appointments (look out 90 days)     Jul 05, 2018  4:50 PM CDT   Return Visit with Maki Topete MD PhD   [facility]    [address] [place] [id number]   [phone]                Shanna Maria RN

## 2018-06-19 NOTE — TELEPHONE ENCOUNTER
Routing refill request to provider for review/approval because:  Drug not on the [facility] refill protocol     methocarbamol (ROBAXIN) 500 MG tablet 90 tablet 1 5/24/2018  --   Sig - Route: Take 1-2 tablets (500-1,000 mg) by mouth 4 times daily as needed for muscle spasms - Oral     lisinopril-hydrochlorothiazide (PRINZIDE/ZESTORETIC) 10-12.5 MG per tablet 30 tablet 0 6/19/2018  No   Sig - Route: Take 1 tablet by mouth daily - Oral     Last OV with Dr. Topete:  5/24/2018    Next 5 appointments (look out 90 days)     Jul 05, 2018  4:50 PM CDT   Return Visit with Maki Topete MD PhD   [facility] ([facility]    [id number] [place] [id number]   [phone]                Shanna Maria RN

## 2018-06-19 NOTE — TELEPHONE ENCOUNTER
lisinopril-hydrochlorothiazide (PRINZIDE/ZESTORETIC) 10-12.5 MG per tablet    Last Written Prescription Date:  5/24/18  Last Fill Quantity: 30 tab,  # refills: 0   Last office visit: 5/24/2018 with prescribing provider:  Dr cochran   Future Office Visit:   Next 5 appointments (look out 90 days)     Jul 05, 2018  4:50 PM CDT   Return Visit with Maki Cochran MD PhD   [facility]    [id number] [place] [phone]   [phone]

## 2018-06-19 NOTE — TELEPHONE ENCOUNTER
lisinopril-hydrochlorothiazide (PRINZIDE/ZESTORETIC) 10-12.5 MG per tablet 30 tablet 0 5/24/2018  --   Sig - Route: Take 1 tablet by mouth daily - Oral     Last OV with Dr. Topete: 5/24/18 -- return in 4 weeks to recheck BP and depression.     Next 5 appointments (look out 90 days)     Jul 05, 2018  4:50 PM CDT   Return Visit with Maki Topete MD PhD   [facility]    [address] [place] [id number] [phone]                Potassium   Date Value Ref Range Status   06/15/2018 4.0 3.4 - 5.3 mmol/L Final     Creatinine   Date Value Ref Range Status   06/15/2018 1.00 0.52 - 1.04 mg/dL Final     BP Readings from Last 3 Encounters:   05/24/18 166/86   02/22/18 142/85   11/20/17 142/86     Refilled per [facility] protocol.    Shanna Maria RN

## 2018-06-19 NOTE — TELEPHONE ENCOUNTER
Message from MyChart:  Original authorizing provider: Maki Topete MD PhD    Jaimee Rodriguez would like a refill of the following medications:  lisinopril-hydrochlorothiazide (PRINZIDE/ZESTORETIC) 10-12.5 MG per tablet [Maki Topete MD PhD]  methocarbamol (ROBAXIN) 500 MG tablet [Maki Topete MD PhD]    Preferred pharmacy: [facility] [place] [id number]    Comment:

## 2018-06-20 RX ORDER — FENTANYL 100 UG/1
1 PATCH TRANSDERMAL
Qty: 15 PATCH | Refills: 0 | Status: SHIPPED | OUTPATIENT
Start: 2018-06-25 | End: 2018-08-21

## 2018-06-20 RX ORDER — HYDROCODONE BITARTRATE AND ACETAMINOPHEN 10; 325 MG/1; MG/1
1 TABLET ORAL EVERY 6 HOURS PRN
Qty: 120 TABLET | Refills: 0 | Status: SHIPPED | OUTPATIENT
Start: 2018-06-25 | End: 2018-08-21

## 2018-06-20 NOTE — TELEPHONE ENCOUNTER
A script for NORCO and a script for fentNYL placed at the  check in C for patients  Steve to .  Patient advised and reminded to have Steve bring a photo ID to  scripts.  Patient stated understanding.    Maribell Macias CMA

## 2018-06-21 RX ORDER — METHOCARBAMOL 500 MG/1
500-1000 TABLET, FILM COATED ORAL 4 TIMES DAILY PRN
Qty: 90 TABLET | Refills: 1 | Status: SHIPPED | OUTPATIENT
Start: 2018-06-21 | End: 2019-04-19

## 2018-06-25 DIAGNOSIS — J44.1 OBSTRUCTIVE CHRONIC BRONCHITIS WITH EXACERBATION (H): ICD-10-CM

## 2018-06-25 RX ORDER — ALBUTEROL SULFATE 90 UG/1
2 AEROSOL, METERED RESPIRATORY (INHALATION) EVERY 6 HOURS PRN
Qty: 1 INHALER | Refills: 11 | Status: SHIPPED | OUTPATIENT
Start: 2018-06-25 | End: 2019-07-25

## 2018-06-25 NOTE — TELEPHONE ENCOUNTER
Faxed refill request received from [facility] [place].   Medication Requested:   albuterol (PROAIR HFA/PROVENTIL HFA/VENTOLIN HFA) 108 (90 BASE) MCG/ACT Inhaler 1 Inhaler 11 8/3/2017  No      Sig - Route: Inhale 2 puffs into the lungs every 6 hours as needed for shortness of breath / dyspnea or wheezing - Inhalation       Last Office Visit: 2/22/2018  Next Appointment Scheduled for: 2/7/2019

## 2018-06-25 NOTE — TELEPHONE ENCOUNTER
"Requested Prescriptions   Signed Prescriptions Disp Refills     albuterol (PROAIR HFA/PROVENTIL HFA/VENTOLIN HFA) 108 (90 Base) MCG/ACT Inhaler 1 Inhaler 11     Sig: Inhale 2 puffs into the lungs every 6 hours as needed for shortness of breath / dyspnea or wheezing    Asthma Maintenance Inhalers - Anticholinergics Passed    6/25/2018 10:02 AM       Passed - Patient is age 12 years or older       Passed - Recent (12 mo) or future (30 days) visit within the authorizing provider's specialty    Patient had office visit in the last 12 months or has a visit in the next 30 days with authorizing provider or within the authorizing provider's specialty.  See \"Patient Info\" tab in inbasket, or \"Choose Columns\" in Meds & Orders section of the refill encounter.              Rx sent via e-prescribe to patient's preferred pharmacy per [facility] medication refill protocol.    Janie MOSLEY RN, BSN  Pulmonary Care Coordinator     "

## 2018-06-30 NOTE — PROGRESS NOTES
Dear Jaimee,   Here are your recent results.   -- electrolytes were normal except for kidney filtration rate (GFR) went down slightly. This may be from mild dehydration or frequent use of ibuprofen or naproxen. If you are using these, cut down on the use. Increase hydration.   -- blood count is normal except for borderline elevated white blood cells. This is not very specific and borderline. If you have any symptoms of infection, please follow up in clinic.     Please call or Mychart to our office if you have further questions.     Maki Topete MD-PhD

## 2018-07-05 ENCOUNTER — OFFICE VISIT (OUTPATIENT)
Dept: PEDIATRICS | Facility: CLINIC | Age: 53
End: 2018-07-05
Payer: COMMERCIAL

## 2018-07-05 VITALS
SYSTOLIC BLOOD PRESSURE: 112 MMHG | DIASTOLIC BLOOD PRESSURE: 68 MMHG | WEIGHT: 188 LBS | OXYGEN SATURATION: 88 % | HEIGHT: 63 IN | BODY MASS INDEX: 33.31 KG/M2 | TEMPERATURE: 97.1 F | HEART RATE: 107 BPM

## 2018-07-05 DIAGNOSIS — Z79.891 LONG TERM (CURRENT) USE OF OPIATE ANALGESIC: ICD-10-CM

## 2018-07-05 DIAGNOSIS — F33.0 MAJOR DEPRESSIVE DISORDER, RECURRENT EPISODE, MILD (H): ICD-10-CM

## 2018-07-05 DIAGNOSIS — I10 HYPERTENSION GOAL BP (BLOOD PRESSURE) < 140/90: ICD-10-CM

## 2018-07-05 DIAGNOSIS — J44.9 COPD, SEVERE (H): ICD-10-CM

## 2018-07-05 DIAGNOSIS — M54.2 CHRONIC NECK PAIN: ICD-10-CM

## 2018-07-05 DIAGNOSIS — G89.29 CHRONIC NECK PAIN: ICD-10-CM

## 2018-07-05 DIAGNOSIS — M47.12 CERVICAL SPONDYLOSIS WITH MYELOPATHY: ICD-10-CM

## 2018-07-05 DIAGNOSIS — Z11.4 SCREENING FOR HIV (HUMAN IMMUNODEFICIENCY VIRUS): ICD-10-CM

## 2018-07-05 DIAGNOSIS — Z01.818 PREOP GENERAL PHYSICAL EXAM: Primary | ICD-10-CM

## 2018-07-05 DIAGNOSIS — R09.02 HYPOXIA: ICD-10-CM

## 2018-07-05 DIAGNOSIS — F33.41 RECURRENT MAJOR DEPRESSIVE DISORDER, IN PARTIAL REMISSION (H): ICD-10-CM

## 2018-07-05 DIAGNOSIS — Z99.81 O2 DEPENDENT: ICD-10-CM

## 2018-07-05 DIAGNOSIS — M48.02 CERVICAL STENOSIS OF SPINAL CANAL: ICD-10-CM

## 2018-07-05 LAB
ANION GAP SERPL CALCULATED.3IONS-SCNC: 5 MMOL/L (ref 3–14)
BUN SERPL-MCNC: 22 MG/DL (ref 7–30)
CALCIUM SERPL-MCNC: 9.5 MG/DL (ref 8.5–10.1)
CHLORIDE SERPL-SCNC: 104 MMOL/L (ref 94–109)
CO2 SERPL-SCNC: 32 MMOL/L (ref 20–32)
CREAT SERPL-MCNC: 0.99 MG/DL (ref 0.52–1.04)
ERYTHROCYTE [DISTWIDTH] IN BLOOD BY AUTOMATED COUNT: 13.6 % (ref 10–15)
GFR SERPL CREATININE-BSD FRML MDRD: 58 ML/MIN/1.7M2
GLUCOSE SERPL-MCNC: 116 MG/DL (ref 70–99)
HCT VFR BLD AUTO: 43.8 % (ref 35–47)
HGB BLD-MCNC: 14.1 G/DL (ref 11.7–15.7)
MCH RBC QN AUTO: 29.4 PG (ref 26.5–33)
MCHC RBC AUTO-ENTMCNC: 32.2 G/DL (ref 31.5–36.5)
MCV RBC AUTO: 91 FL (ref 78–100)
PLATELET # BLD AUTO: 284 10E9/L (ref 150–450)
POTASSIUM SERPL-SCNC: 4.4 MMOL/L (ref 3.4–5.3)
RBC # BLD AUTO: 4.79 10E12/L (ref 3.8–5.2)
SODIUM SERPL-SCNC: 141 MMOL/L (ref 133–144)
WBC # BLD AUTO: 9.2 10E9/L (ref 4–11)

## 2018-07-05 PROCEDURE — 99215 OFFICE O/P EST HI 40 MIN: CPT | Performed by: INTERNAL MEDICINE

## 2018-07-05 PROCEDURE — 85027 COMPLETE CBC AUTOMATED: CPT | Performed by: INTERNAL MEDICINE

## 2018-07-05 PROCEDURE — 87389 HIV-1 AG W/HIV-1&-2 AB AG IA: CPT | Performed by: INTERNAL MEDICINE

## 2018-07-05 PROCEDURE — 36415 COLL VENOUS BLD VENIPUNCTURE: CPT | Performed by: INTERNAL MEDICINE

## 2018-07-05 PROCEDURE — 80048 BASIC METABOLIC PNL TOTAL CA: CPT | Performed by: INTERNAL MEDICINE

## 2018-07-05 RX ORDER — VENLAFAXINE HYDROCHLORIDE 150 MG/1
150 CAPSULE, EXTENDED RELEASE ORAL DAILY
Qty: 30 CAPSULE | Refills: 1 | Status: SHIPPED | OUTPATIENT
Start: 2018-07-05 | End: 2018-09-06

## 2018-07-05 RX ORDER — LISINOPRIL/HYDROCHLOROTHIAZIDE 10-12.5 MG
1 TABLET ORAL DAILY
Qty: 90 TABLET | Refills: 1 | Status: SHIPPED | OUTPATIENT
Start: 2018-07-05 | End: 2019-02-05

## 2018-07-05 NOTE — LETTER
My Depression Action Plan  Name: Jaimee Rodriguez   Date of Birth 1965  Date: 7/5/2018    My doctor: Maki Topete   My clinic: [address] [address] [phone]          GREEN    ZONE   Good Control    What it looks like:     Things are going generally well. You have normal up s and down s. You may even feel depressed from time to time, but bad moods usually last less than a day.   What you need to do:  1. Continue to care for yourself (see self care plan)  2. Check your depression survival kit and update it as needed  3. Follow your physician s recommendations including any medication.  4. Do not stop taking medication unless you consult with your physician first.           YELLOW         ZONE Getting Worse    What it looks like:     Depression is starting to interfere with your life.     It may be hard to get out of bed; you may be starting to isolate yourself from others.    Symptoms of depression are starting to last most all day and this has happened for several days.     You may have suicidal thoughts but they are not constant.   What you need to do:     1. Call your care team, your response to treatment will improve if you keep your care team informed of your progress. Yellow periods are signs an adjustment may need to be made.     2. Continue your self-care, even if you have to fake it!    3. Talk to someone in your support network    4. Open up your depression survival kit           RED    ZONE Medical Alert - Get Help    What it looks like:     Depression is seriously interfering with your life.     You may experience these or other symptoms: You can t get out of bed most days, can t work or engage in other necessary activities, you have trouble taking care of basic hygiene, or basic responsibilities, thoughts of suicide or death that will not go away, self-injurious behavior.     What you need to do:  1. Call your care team and request a  same-day appointment. If they are not available (weekends or after hours) call your local crisis line, emergency room or 911.            Depression Self Care Plan / Survival Kit    Self-Care for Depression  Here s the deal. Your body and mind are really not as separate as most people think.  What you do and think affects how you feel and how you feel influences what you do and think. This means if you do things that people who feel good do, it will help you feel better.  Sometimes this is all it takes.  There is also a place for medication and therapy depending on how severe your depression is, so be sure to consult with your medical provider and/ or Behavioral Health Consultant if your symptoms are worsening or not improving.     In order to better manage my stress, I will:    Exercise  Get some form of exercise, every day. This will help reduce pain and release endorphins, the  feel good  chemicals in your brain. This is almost as good as taking antidepressants!  This is not the same as joining a gym and then never going! (they count on that by the way ) It can be as simple as just going for a walk or doing some gardening, anything that will get you moving.      Hygiene   Maintain good hygiene (Get out of bed in the morning, Make your bed, Brush your teeth, Take a shower, and Get dressed like you were going to work, even if you are unemployed).  If your clothes don't fit try to get ones that do.    Diet  I will strive to eat foods that are good for me, drink plenty of water, and avoid excessive sugar, caffeine, alcohol, and other mood-altering substances.  Some foods that are helpful in depression are: complex carbohydrates, B vitamins, flaxseed, fish or fish oil, fresh fruits and vegetables.    Psychotherapy  I agree to participate in Individual Therapy (if recommended).    Medication  If prescribed medications, I agree to take them.  Missing doses can result in serious side effects.  I understand that drinking  alcohol, or other illicit drug use, may cause potential side effects.  I will not stop my medication abruptly without first discussing it with my provider.    Staying Connected With Others  I will stay in touch with my friends, family members, and my primary care provider/team.    Use your imagination  Be creative.  We all have a creative side; it doesn t matter if it s oil painting, sand castles, or mud pies! This will also kick up the endorphins.    Witness Beauty  (AKA stop and smell the roses) Take a look outside, even in mid-winter. Notice colors, textures. Watch the squirrels and birds.     Service to others  Be of service to others.  There is always someone else in need.  By helping others we can  get out of ourselves  and remember the really important things.  This also provides opportunities for practicing all the other parts of the program.    Humor  Laugh and be silly!  Adjust your TV habits for less news and crime-drama and more comedy.    Control your stress  Try breathing deep, massage therapy, biofeedback, and meditation. Find time to relax each day.     My support system    Clinic Contact:  Phone number:    Contact 1:  Phone number:    Contact 2:  Phone number:    M[employer]onism/:  Phone number:    Therapist:  Phone number:    Local crisis center:    Phone number:    Other community support:  Phone number:

## 2018-07-05 NOTE — PROGRESS NOTES
SUBJECTIVE:   [facility]  [address] [address] [place] [address]  [phone]  Dept: [phone]    PRE-OP EVALUATION:  Today's date: 2018    Jaimee Rodriguez (: 1965) presents for pre-operative evaluation assessment as requested by Dr. Simms.  She requires evaluation and anesthesia risk assessment prior to undergoing surgery/procedure for treatment of Cervical fusion .    Proposed Surgery/ Procedure: Cervical fusion  Date of Surgery/ Procedure: 18  Time of Surgery/ Procedure: 12:00pm  Hospital/Surgical Facility: [facility]  Fax number for surgical facility: [phone]  Primary Physician: Maki Topete  Type of Anesthesia Anticipated: General    Patient has a Health Care Directive or Living Will:  NO    1. NO - Do you have a history of heart attack, stroke, stent, bypass or surgery on an artery in the head, neck, heart or legs?  2. NO - Do you ever have any pain or discomfort in your chest?  3. NO - Do you have a history of  Heart Failure?  4. YES - Are you troubled by shortness of breath when: walking on the level, up a slight hill or at night?  5. NO - Do you currently have a cold, bronchitis or other respiratory infection?  6. YES- Do you have a cough, shortness of breath or wheezing?  7. NO - Do you sometimes get pains in the calves of your legs when you walk?  8. NO - Do you or anyone in your family have previous history of blood clots?  9. NO - Do you or does anyone in your family have a serious bleeding problem such as prolonged bleeding following surgeries or cuts?  10. NO - Have you ever had problems with anemia or been told to take iron pills?  11. NO - Have you had any abnormal blood loss such as black, tarry or bloody stools, or abnormal vaginal bleeding?  12. NO - Have you ever had a blood transfusion?  13. NO - Have you or any of your relatives ever had problems with anesthesia?  14. NO - Do you have sleep apnea, excessive snoring or daytime drowsiness?  15. NO  - Do you have any prosthetic heart valves?  16. NO - Do you have prosthetic joints?  17. NO - Is there any chance that you may be pregnant?      HPI:     HPI related to upcoming procedure: patient has chronic neck pain with prior surgery. Now she has new disc herniation with myelopathy. She is scheduled for anterior cervical decompression fusion from level C3-C5   And ICBG harvest anterior.    Jaimee has Chronic neck pain; Major depression in partial remission; COPD, severe (H); Hypoxia; and O2 dependent on her pertinent problem list.    Patient COPD is quite severe.  She has chronic hypoxia on room air and is oxygen dependent.  She continues to smoke.    She also is on long-term opiates for chronic pain control.    MEDICAL HISTORY:     Patient Active Problem List    Diagnosis Date Noted     COPD, severe (H) 03/15/2015     Priority: High     Hypoxia 03/15/2015     Priority: High     O2 dependent 03/15/2015     Priority: High     Major depression in partial remission 03/04/2013     Priority: High     Has psychiatrist: failed Fetizma (ineffective), Mitazirpine (angry), fluoxetine (ineffective), sertraline (ineffective), wellbutrin (ineffective), Lexapro (ineffective), Cymbalta (ineffective).        Chronic neck pain 10/28/2009     Priority: High     Patient is followed by Dr. Barnes at [facility] for pain management as of 9.4.2013. Patient has pain pump put in on 8/12/2013, mixed of fentanyl and bupivacaine through the pump.   Previous management with Dr. Clayton has stopped as of 9/4/2013. Restarted 1/2014.     Patient is followed by AJVIER CLAYTON for ongoing prescription of pain medication.  All refills should be approved by this provider, or covering partner.    Medication(s): fentaly patch 100 mcg q48 hours and hydrocodone/acetaminophin 10/325 mg QID.   Maximum quantity per month: 15 patches, 120 tabs of vicodin.   Clinic visit frequency required: Q 6  months     Controlled substance agreement on file: Yes       Date(s):  2/10/2012    Pain Clinic evaluation in the past: Yes       Date/Location:  [facility] . Failed pain pump due to pump site infection in 2013    DIRE Total Score(s):  No flowsheet data found.    Last [facility] website verification:  done on 16   [email or web address]           Tobacco use disorder 2018     Priority: Medium     Former smoker 03/15/2015     Priority: Medium     Quite 3/4/2015       Chronic insomnia 03/15/2015     Priority: Medium     Emphysema lung (H) 2014     Priority: Medium     Depression with anxiety 2014     Priority: Medium     Fusion of spine of cervical region 2013     Priority: Medium     CARDIOVASCULAR SCREENING; LDL GOAL LESS THAN 160 2013     Priority: Low     Obesity 2013     Priority: Low     Other acne 2005     Priority: Low      Past Medical History:   Diagnosis Date     Cervical spondylosis without myelopathy     With radiculopathy.     Contact dermatitis and other eczema, due to unspecified cause     eczema     Endometriosis, site unspecified     R uterosacral ligament/surgically removed     Irregular menstrual cycle      NONSPECIFIC MEDICAL HISTORY     Mood swings, Irritability     Other acne      Other and unspecified ovarian cyst     sydni R     Tobacco use disorder 2001     Past Surgical History:   Procedure Laterality Date     C APPENDECTOMY       C  DELIVERY ONLY  ,,    , Low Cervical     C LIGATE FALLOPIAN TUBE      S/P tubal ligation     C TOTAL ABDOM HYSTERECTOMY      not total     pt did not have ovaries removed     HC ENLARGE BREAST WITH IMPLANT       SURGICAL HISTORY OF -   10/18/2005    C6 corpectomy with iliac crest autograft fixation. [facility]     Current Outpatient Prescriptions   Medication Sig Dispense Refill     fentaNYL (DURAGESIC) 100 mcg/hr 72 hr patch Place 1 patch onto the skin every 48 hours (Mylan brand) 15 patch 0     HYDROcodone-acetaminophen (NORCO)   MG per tablet Take 1 tablet by mouth every 6 hours as needed for pain 120 tablet 0     lisinopril-hydrochlorothiazide (PRINZIDE/ZESTORETIC) 10-12.5 MG per tablet Take 1 tablet by mouth daily 90 tablet 1     methocarbamol (ROBAXIN) 500 MG tablet Take 1-2 tablets (500-1,000 mg) by mouth 4 times daily as needed for muscle spasms 90 tablet 1     mometasone-formoterol (DULERA) 200-5 MCG/ACT oral inhaler Inhale 2 puffs into the lungs 2 times daily 1 Inhaler 11     tiotropium (SPIRIVA RESPIMAT) 2.5 MCG/ACT inhalation aerosol Inhale 2 puffs into the lungs daily 12 g 11     traZODone (DESYREL) 100 MG tablet Take 1.5-2 tablets (150-200 mg) by mouth nightly as needed for sleep 60 tablet 3     venlafaxine (EFFEXOR-XR) 150 MG 24 hr capsule Take 1 capsule (150 mg) by mouth daily 30 capsule 1     albuterol (PROAIR HFA/PROVENTIL HFA/VENTOLIN HFA) 108 (90 Base) MCG/ACT Inhaler Inhale 2 puffs into the lungs every 6 hours as needed for shortness of breath / dyspnea or wheezing 1 Inhaler 11     guaiFENesin (MUCINEX) 600 MG 12 hr tablet Take 1 tablet (600 mg) by mouth 2 times daily as needed for congestion 20 tablet 3     order for DME Equipment being ordered: Superfeet - Blue - Size C 1 Device 0     ORDER FOR DME Equipment being ordered: Oxygen 4 lpm via nasal cannula continuous flow with any exertion and 2 lpm continuous flow with nasal cannula at rest and at night. Provide portability.  Length of need 99. 1 Device 1     polyethylene glycol (MIRALAX/GLYCOLAX) powder MIX 17 GRAMS(ONE CAPFUL) IN LIQUID AND DRINK ONCE DAILY AS NEEDED FOR CONSTIPATION 527 g 5     tiZANidine (ZANAFLEX) 4 MG tablet Take 1 tablet (4 mg) by mouth 3 times daily 90 tablet 0     zolpidem (AMBIEN CR) 6.25 MG CR tablet TAKE 1 TABLET BY MOUTH NIGHTLY AS NEEDED FOR SLEEP 30 tablet 0     [DISCONTINUED] fluticasone-salmeterol (ADVAIR) 500-50 MCG/DOSE diskus inhaler Inhale 1 puff into the lungs 2 times daily 1 Inhaler 1     OTC products: None, except as noted  "above    Allergies   Allergen Reactions     Morphine       Latex Allergy: NO    Social History   Substance Use Topics     Smoking status: Current Every Day Smoker     Packs/day: 0.75     Years: 35.00     Types: Cigarettes     Smokeless tobacco: Never Used      Comment: started age 14.     Alcohol use 0.0 oz/week     0 Standard drinks or equivalent per week      Comment: occ     History   Drug Use No       REVIEW OF SYSTEMS:   CONSTITUTIONAL: NEGATIVE for fever, chills, change in weight  ENT/MOUTH: NEGATIVE for ear, mouth and throat problems  RESP: NEGATIVE for significant cough or SOB  CV: NEGATIVE for chest pain, palpitations or peripheral edema  MUSCULOSKELETAL: POSITIVE  for neck pain    EXAM:   /68  Pulse 107  Temp 97.1  F (36.2  C) (Temporal)  Ht 5' 2.5\" (1.588 m)  Wt 188 lb (85.3 kg)  SpO2 (!) 88%  BMI 33.84 kg/m2  GENERAL APPEARANCE: healthy, alert and no distress  HENT: ear canals and TM's normal and nose and mouth without ulcers or lesions  RESP: lungs clear to auscultation - no rales, rhonchi or wheezes  CV: regular rate and rhythm, normal S1 S2, no S3 or S4 and no murmur, click or rub   ABDOMEN: soft, nontender, no HSM or masses and bowel sounds normal  MS: extremities normal- no gross deformities noted    DIAGNOSTICS:   EKG 5/24/18: sinus with frequent PACs    Examination:   NM MPI DOBUTAMINE   Code:   [id number]   Date:  6/15/2018 12:26 PM      Indication:  RODRIGUEZ, chest tightness; Chest tightness      Additional Information: .      Protocol:    Rest and stress myocardial perfusion imaging was performed using 9.6  and 33.8 mCi of Tc-99m tetrafosmin. Pharmacological stress was  performed with dobutamine.      Findings:  1. Overall quality of the study: Good.   2. Left ventricular cavity is normal on the rest and stress studies.  3. SPECT images demonstrate normal myocardial perfusion on rest and  stress imaging.  These results suggest a low post-scan likelihood  significant coronary artery disease. " The summed stress score is 1.   4.  Left ventricular ejection fraction is 68%. Left ventricular  end-diastolic volume is 136 mL. End-systolic volume is 43 mL.         Impression:  1. Normal myocardial SPECT study with a summed stress score of 1.   2. Normal ejection fraction 68%.     SAM RAJAN MD    Recent Labs   Lab Test  06/15/18   0931  11/20/17   1337  12/19/16   0855   HGB  13.3  13.3  13.8   PLT  294  302  280   NA  140   --   141   POTASSIUM  4.0   --   4.4   CR  1.00   --   0.78        IMPRESSION:   Reason for surgery/procedure: Cervical spondylosis with myelopathy  Diagnosis/reason for consult:       ICD-10-CM    1. Preop general physical exam Z01.818 Basic metabolic panel     CBC with platelets   2. Cervical spondylosis with myelopathy M47.12 Basic metabolic panel     CBC with platelets   3. Cervical stenosis of spinal canal M48.02 Basic metabolic panel     CBC with platelets   4. COPD, severe (H) J44.9 Basic metabolic panel     CBC with platelets   5. Recurrent major depressive disorder, in partial remission (H) F33.41 DEPRESSION ACTION PLAN (DAP)   6. Hypertension goal BP (blood pressure) < 140/90 I10 Basic metabolic panel     CBC with platelets     lisinopril-hydrochlorothiazide (PRINZIDE/ZESTORETIC) 10-12.5 MG per tablet   7. O2 dependent Z99.81    8. Hypoxia R09.02    9. Major depressive disorder, recurrent episode, mild (H) F33.0 venlafaxine (EFFEXOR-XR) 150 MG 24 hr capsule   10. Chronic neck pain M54.2     G89.29    11. Screening for HIV (human immunodeficiency virus) Z11.4 HIV Antigen Antibody Combo   12. Long term (current) use of opiate analgesic Z79.891         The proposed surgical procedure is considered INTERMEDIATE risk.    REVISED CARDIAC RISK INDEX  The patient has the following serious cardiovascular risks for perioperative complications such as (MI, PE, VFib and 3  AV Block):  No serious cardiac risks  INTERPRETATION: 0 risks: Class I (very low risk - 0.4% complication  rate)    The patient has the following additional risks for perioperative complications:  Oxygen dependent lung disease -- severe COPD  High tolerance to opioid analgesics due to long term opiate analgesic use  Ongoing tobacco use      ICD-10-CM    1. Preop general physical exam Z01.818 Basic metabolic panel     CBC with platelets   2. Cervical spondylosis with myelopathy M47.12 Basic metabolic panel     CBC with platelets   3. Cervical stenosis of spinal canal M48.02 Basic metabolic panel     CBC with platelets   4. COPD, severe (H) J44.9 Basic metabolic panel     CBC with platelets   5. Recurrent major depressive disorder, in partial remission (H) F33.41 DEPRESSION ACTION PLAN (DAP)   6. Hypertension goal BP (blood pressure) < 140/90 I10 Basic metabolic panel     CBC with platelets     lisinopril-hydrochlorothiazide (PRINZIDE/ZESTORETIC) 10-12.5 MG per tablet   7. O2 dependent Z99.81    8. Hypoxia R09.02    9. Major depressive disorder, recurrent episode, mild (H) F33.0 venlafaxine (EFFEXOR-XR) 150 MG 24 hr capsule   10. Chronic neck pain M54.2     G89.29    11. Screening for HIV (human immunodeficiency virus) Z11.4 HIV Antigen Antibody Combo   12. Long term (current) use of opiate analgesic Z79.891        RECOMMENDATIONS:     --Consult hospital rounder / IM to assist post-op medical management    Pain management:  Consult inpatient pain team for optimal medical management of postoperative pain in the setting of ongoing chronic pain.     I recommend surgical team to manage her postoperative pain and then transfer patient back to primary care provider for her chronic pain management.     Pulmonary Risk  Incentive spirometry post op  Respiratory Therapy (Respiratory Care IP Consult)  post op  NG tube decompression if abdominal distension or significant vomiting   Maximize COPD treatment    Advised smoking cessation.       --Patient is to take all scheduled medications on the day of surgery EXCEPT for modifications  listed below.    ACE Inhibitor or Angiotensin Receptor Blocker (ARB) Use  Ace inhibitor or Angiotensin Receptor Blocker (ARB) and should HOLD this medication for the 24 hours prior to surgery.      APPROVAL GIVEN to proceed with proposed procedure, without further diagnostic evaluation       Signed Electronically by: Maki Topete MD PhD    Copy of this evaluation report is provided to requesting physician.    [place] Preop Guidelines    Revised Cardiac Risk Index

## 2018-07-05 NOTE — MR AVS SNAPSHOT
After Visit Summary   7/5/2018    Jaimee Rodriguez    MRN: [id number]           Patient Information     Date Of Birth          1965        Visit Information        Provider Department      7/5/2018 4:50 PM Maki Topete MD PhD [facility]        Today's Diagnoses     Preop general physical exam    -  1    Cervical spondylosis with myelopathy        Cervical stenosis of spinal canal        COPD, severe (H)        Recurrent major depressive disorder, in partial remission (H)        Hypertension goal BP (blood pressure) < 140/90        O2 dependent        Hypoxia        Major depressive disorder, recurrent episode, mild (H)        Chronic neck pain        Screening for HIV (human immunodeficiency virus)          Care Instructions    Make appointment(s) for:   -- get labs today  -- clinic follow up in mid August.       Before your surgery:  10 days before: stop all over the counter supplements  1 week before: stop aspirin if you are taking aspirin.  2 days before: stop ibuprofen, naproxen or similar antiinflammatory medications. You may use Tylenol for pain or headache.     On the day of your surgery:  You may take Dulera, Fentanyl patch, hydrocodone, and Spiriva with the smallest amount of water possible.     After surgery:   You may resume all your medications after the surgery unless your surgeon instructs you otherwise.       Ask your surgeon to request inpatient pain consult for pain management and surgeon to manage postoperative pain for 6 weeks.       Medication(s) prescribed today:    Orders Placed This Encounter   Medications     lisinopril-hydrochlorothiazide (PRINZIDE/ZESTORETIC) 10-12.5 MG per tablet     Sig: Take 1 tablet by mouth daily     Dispense:  90 tablet     Refill:  1           Before Your Surgery      Call your surgeon if there is any change in your health. This includes signs of a cold or flu (such as a sore throat, runny nose, cough, rash or fever).    Do not smoke,  drink alcohol or take over the counter medicine (unless your surgeon or primary care doctor tells you to) for the 24 hours before and after surgery.    If you take prescribed drugs: Follow your doctor s orders about which medicines to take and which to stop until after surgery.    Eating and drinking prior to surgery: follow the instructions from your surgeon    Take a shower or bath the night before surgery. Use the soap your surgeon gave you to gently clean your skin. If you do not have soap from your surgeon, use your regular soap. Do not shave or scrub the surgery site.  Wear clean pajamas and have clean sheets on your bed.           Follow-ups after your visit        Your next 10 appointments already scheduled     Feb 07, 2019  1:00 PM CST   Office Visit with PFT LAB   [facility])    [address] [place] [id number] [phone]           Bring a current list of meds and any records pertaining to this visit. For Physicals, please bring immunization records and any forms needing to be filled out. Please arrive 10 minutes early to complete paperwork.            Feb 07, 2019  2:00 PM CST   Return Visit with David Morris Perlman, MD   [facility])    [address] [place] [address] [phone]              Who to contact     If you have questions or need follow up information about today's clinic visit or your schedule please contact [facility] directly at [phone].  Normal or non-critical lab and imaging results will be communicated to you by MyChart, letter or phone within 4 business days after the clinic has received the results. If you do not hear from us within 7 days, please contact the clinic through MyChart or phone. If you have a critical or abnormal lab result, we will notify you by phone as soon as possible.  Submit refill requests through [employer] or call  "your pharmacy and they will forward the refill request to us. Please allow 3 business days for your refill to be completed.          Additional Information About Your Visit        [employer] Information     [email or web address] gives you secure access to your electronic health record. If you see a primary care provider, you can also send messages to your care team and make appointments. If you have questions, please call your primary care clinic.  If you do not have a primary care provider, please call [phone] and they will assist you.      [email or web address] is an electronic gateway that provides easy, online access to your medical records. With [email or web address], you can request a clinic appointment, read your test results, renew a prescription or communicate with your care team.     To access your existing account, please contact your [facility] or call [phone] for assistance.        Care EveryWhere ID     This is your Care EveryWhere ID. This could be used by other organizations to access your [facility] medical records  [id number]        Your Vitals Were     Pulse Temperature Height Pulse Oximetry BMI (Body Mass Index)       107 97.1  F (36.2  C) (Temporal) 5' 2.5\" (1.588 m) 88% 33.84 kg/m2        Blood Pressure from Last 3 Encounters:   07/05/18 112/68   05/24/18 166/86   02/22/18 142/85    Weight from Last 3 Encounters:   07/05/18 188 lb (85.3 kg)   05/24/18 193 lb (87.5 kg)   02/22/18 183 lb 3.2 oz (83.1 kg)              We Performed the Following     Basic metabolic panel     CBC with platelets     DEPRESSION ACTION PLAN (DAP)     HIV Antigen Antibody Combo          Today's Medication Changes          These changes are accurate as of 7/5/18  5:31 PM.  If you have any questions, ask your nurse or doctor.               These medicines have changed or have updated prescriptions.        Dose/Directions    venlafaxine 150 MG 24 hr capsule   Commonly known as:  EFFEXOR-XR   This may have changed:    - " medication strength  - how much to take  - how to take this  - when to take this  - additional instructions   Used for:  Major depressive disorder, recurrent episode, mild (H)   Changed by:  Maki Topete MD PhD        Dose:  150 mg   Take 1 capsule (150 mg) by mouth daily   Quantity:  30 capsule   Refills:  1            Where to get your medicines      These medications were sent to [facility] [address] [facility] [address] [facility] [address]  [id number] [place] [address] [address], [facility] [address]     Phone:  [phone]     lisinopril-hydrochlorothiazide 10-12.5 MG per tablet    venlafaxine 150 MG 24 hr capsule                Primary Care Provider Office Phone # Fax #    Maki Topete MD PhD [phone]       [address]  [facility] [address]        Equal Access to Services     [facility]: Hadii pat kumar hadasho Soomaali, waaxda luqadaha, qaybta kaalmada adeegyada, morris lay . So [facility] [phone].    ATENCIÓN: Si habla español, tiene a pepe disposición servicios gratuitos de asistencia lingüística. [facility] [phone].    We comply with applicable federal civil rights laws and [place] laws. We do not discriminate on the basis of race, color, national origin, age, disability, sex, sexual orientation, or gender identity.            Thank you!     Thank you for choosing [facility]  for your care. Our goal is always to provide you with excellent care. Hearing back from our patients is one way we can continue to improve our services. Please take a few minutes to complete the written survey that you may receive in the mail after your visit with us. Thank you!             Your Updated Medication List - Protect others around you: Learn how to safely use, store and throw away your medicines at www.disposemymeds.org.          This list is accurate as of 7/5/18  5:31 PM.  Always use your most recent med list.                   Brand Name Dispense Instructions  for use Diagnosis    albuterol 108 (90 Base) MCG/ACT Inhaler    PROAIR HFA/PROVENTIL HFA/VENTOLIN HFA    1 Inhaler    Inhale 2 puffs into the lungs every 6 hours as needed for shortness of breath / dyspnea or wheezing    Obstructive chronic bronchitis with exacerbation (H)       fentaNYL 100 mcg/hr 72 hr patch    DURAGESIC    15 patch    Place 1 patch onto the skin every 48 hours (Mylan brand)    Chronic neck pain, Fusion of spine of cervical region, Long term current use of opiate analgesic       guaiFENesin 600 MG 12 hr tablet    MUCINEX    20 tablet    Take 1 tablet (600 mg) by mouth 2 times daily as needed for congestion    Pulmonary emphysema, unspecified emphysema type (H)       HYDROcodone-acetaminophen  MG per tablet    NORCO    120 tablet    Take 1 tablet by mouth every 6 hours as needed for pain    Chronic neck pain, Fusion of spine of cervical region, Long term current use of opiate analgesic       lisinopril-hydrochlorothiazide 10-12.5 MG per tablet    PRINZIDE/ZESTORETIC    90 tablet    Take 1 tablet by mouth daily    Hypertension goal BP (blood pressure) < 140/90       methocarbamol 500 MG tablet    ROBAXIN    90 tablet    Take 1-2 tablets (500-1,000 mg) by mouth 4 times daily as needed for muscle spasms    Chronic neck pain, Muscle spasm       mometasone-formoterol 200-5 MCG/ACT oral inhaler    DULERA    1 Inhaler    Inhale 2 puffs into the lungs 2 times daily    Pulmonary emphysema, unspecified emphysema type (H)       order for DME     1 Device    Equipment being ordered: Oxygen 4 lpm via nasal cannula continuous flow with any exertion and 2 lpm continuous flow with nasal cannula at rest and at night. Provide portability.  Length of need 99.    COPD (chronic obstructive pulmonary disease) (H), Dyspnea       order for DME     1 Device    Equipment being ordered: [employer] - Blue - Size C    Plantar fasciitis       polyethylene glycol powder    MIRALAX/GLYCOLAX    527 g    MIX 17 GRAMS(ONE  CAPFUL) IN LIQUID AND DRINK ONCE DAILY AS NEEDED FOR CONSTIPATION    Drug-induced constipation       tiotropium 2.5 MCG/ACT inhalation aerosol    SPIRIVA RESPIMAT    12 g    Inhale 2 puffs into the lungs daily    Pulmonary emphysema, unspecified emphysema type (H)       tiZANidine 4 MG tablet    ZANAFLEX    90 tablet    Take 1 tablet (4 mg) by mouth 3 times daily    Muscle spasm       traZODone 100 MG tablet    DESYREL    60 tablet    Take 1.5-2 tablets (150-200 mg) by mouth nightly as needed for sleep    Chronic insomnia       venlafaxine 150 MG 24 hr capsule    EFFEXOR-XR    30 capsule    Take 1 capsule (150 mg) by mouth daily    Major depressive disorder, recurrent episode, mild (H)       zolpidem 6.25 MG CR tablet    AMBIEN CR    30 tablet    TAKE 1 TABLET BY MOUTH NIGHTLY AS NEEDED FOR SLEEP    Drug-induced constipation

## 2018-07-05 NOTE — PATIENT INSTRUCTIONS
Make appointment(s) for:   -- get labs today  -- clinic follow up in mid August.       Before your surgery:  10 days before: stop all over the counter supplements  1 week before: stop aspirin if you are taking aspirin.  2 days before: stop ibuprofen, naproxen or similar antiinflammatory medications. You may use Tylenol for pain or headache.     On the day of your surgery:  You may take Dulera, Fentanyl patch, hydrocodone, and Spiriva with the smallest amount of water possible.     After surgery:   You may resume all your medications after the surgery unless your surgeon instructs you otherwise.       Ask your surgeon to request inpatient pain consult for pain management and surgeon to manage postoperative pain for 6 weeks.       Medication(s) prescribed today:    Orders Placed This Encounter   Medications     lisinopril-hydrochlorothiazide (PRINZIDE/ZESTORETIC) 10-12.5 MG per tablet     Sig: Take 1 tablet by mouth daily     Dispense:  90 tablet     Refill:  1           Before Your Surgery      Call your surgeon if there is any change in your health. This includes signs of a cold or flu (such as a sore throat, runny nose, cough, rash or fever).    Do not smoke, drink alcohol or take over the counter medicine (unless your surgeon or primary care doctor tells you to) for the 24 hours before and after surgery.    If you take prescribed drugs: Follow your doctor s orders about which medicines to take and which to stop until after surgery.    Eating and drinking prior to surgery: follow the instructions from your surgeon    Take a shower or bath the night before surgery. Use the soap your surgeon gave you to gently clean your skin. If you do not have soap from your surgeon, use your regular soap. Do not shave or scrub the surgery site.  Wear clean pajamas and have clean sheets on your bed.

## 2018-07-06 LAB — HIV 1+2 AB+HIV1 P24 AG SERPL QL IA: NONREACTIVE

## 2018-07-06 NOTE — PROGRESS NOTES
Pre op, ECG and cardiac stress test faxed to [facility] fax# [phone].    Maribell Macias CMA

## 2018-07-07 NOTE — PROGRESS NOTES
Dear Jaimee,   Here are your recent results.   -- labs are normal or at baseline. Acceptable for surgery.     Please call or Mychart to our office if you have further questions.     Maki Topete MD-PhD

## 2018-08-08 DIAGNOSIS — J43.9 PULMONARY EMPHYSEMA, UNSPECIFIED EMPHYSEMA TYPE (H): ICD-10-CM

## 2018-08-08 NOTE — TELEPHONE ENCOUNTER
Writer received a refill request from: Steve in [place].     Medication: Mucus relief   Si mg tablet ER - one tablet twice daily as needed  Date last written: 2018  Dispensed amount: 20  Refills: 3  Date last dispensed: 2018      Pt's last office visit: 2018  Next scheduled office visit: 2019

## 2018-08-13 RX ORDER — GUAIFENESIN 600 MG/1
600 TABLET, EXTENDED RELEASE ORAL 2 TIMES DAILY PRN
Qty: 20 TABLET | Refills: 3 | Status: SHIPPED | OUTPATIENT
Start: 2018-08-13 | End: 2018-11-08

## 2018-08-21 ENCOUNTER — OFFICE VISIT (OUTPATIENT)
Dept: PEDIATRICS | Facility: CLINIC | Age: 53
End: 2018-08-21
Payer: COMMERCIAL

## 2018-08-21 VITALS
TEMPERATURE: 97.4 F | BODY MASS INDEX: 34.74 KG/M2 | SYSTOLIC BLOOD PRESSURE: 110 MMHG | WEIGHT: 193 LBS | OXYGEN SATURATION: 96 % | HEART RATE: 92 BPM | DIASTOLIC BLOOD PRESSURE: 78 MMHG

## 2018-08-21 DIAGNOSIS — M54.2 CHRONIC NECK PAIN: Primary | ICD-10-CM

## 2018-08-21 DIAGNOSIS — G89.29 CHRONIC NECK PAIN: Primary | ICD-10-CM

## 2018-08-21 DIAGNOSIS — Z79.891 LONG TERM CURRENT USE OF OPIATE ANALGESIC: ICD-10-CM

## 2018-08-21 DIAGNOSIS — F51.04 CHRONIC INSOMNIA: ICD-10-CM

## 2018-08-21 DIAGNOSIS — M43.22 FUSION OF SPINE OF CERVICAL REGION: ICD-10-CM

## 2018-08-21 PROCEDURE — 99213 OFFICE O/P EST LOW 20 MIN: CPT | Performed by: INTERNAL MEDICINE

## 2018-08-21 RX ORDER — TRAZODONE HYDROCHLORIDE 100 MG/1
150-200 TABLET ORAL
Qty: 180 TABLET | Refills: 3 | Status: SHIPPED | OUTPATIENT
Start: 2018-08-21 | End: 2021-03-23

## 2018-08-21 RX ORDER — HYDROCODONE BITARTRATE AND ACETAMINOPHEN 10; 325 MG/1; MG/1
1 TABLET ORAL EVERY 6 HOURS PRN
Qty: 120 TABLET | Refills: 0 | Status: SHIPPED | OUTPATIENT
Start: 2018-08-21 | End: 2018-09-05

## 2018-08-21 RX ORDER — FENTANYL 100 UG/1
1 PATCH TRANSDERMAL
Qty: 15 PATCH | Refills: 0 | Status: SHIPPED | OUTPATIENT
Start: 2018-08-21 | End: 2018-09-19

## 2018-08-21 ASSESSMENT — ANXIETY QUESTIONNAIRES
6. BECOMING EASILY ANNOYED OR IRRITABLE: NOT AT ALL
3. WORRYING TOO MUCH ABOUT DIFFERENT THINGS: NOT AT ALL
2. NOT BEING ABLE TO STOP OR CONTROL WORRYING: NOT AT ALL
5. BEING SO RESTLESS THAT IT IS HARD TO SIT STILL: NOT AT ALL
1. FEELING NERVOUS, ANXIOUS, OR ON EDGE: NOT AT ALL
7. FEELING AFRAID AS IF SOMETHING AWFUL MIGHT HAPPEN: NOT AT ALL
GAD7 TOTAL SCORE: 0

## 2018-08-21 ASSESSMENT — PATIENT HEALTH QUESTIONNAIRE - PHQ9: 5. POOR APPETITE OR OVEREATING: NOT AT ALL

## 2018-08-21 NOTE — PATIENT INSTRUCTIONS
Make appointment(s) for:   -- return in 6 months for med review.         Medication(s) prescribed today:    Orders Placed This Encounter   Medications     traZODone (DESYREL) 100 MG tablet     Sig: Take 1.5-2 tablets (150-200 mg) by mouth nightly as needed for sleep     Dispense:  180 tablet     Refill:  3     HYDROcodone-acetaminophen (NORCO)  MG per tablet     Sig: Take 1 tablet by mouth every 6 hours as needed for pain     Dispense:  120 tablet     Refill:  0     fentaNYL (DURAGESIC) 100 mcg/hr 72 hr patch     Sig: Place 1 patch onto the skin every 48 hours (Mylan brand)     Dispense:  15 patch     Refill:  0

## 2018-08-21 NOTE — PROGRESS NOTES
SUBJECTIVE:   Jaimee Rodriguez is a 53 year old female who presents to clinic today for the following health issues:      Medication Followup of pain meds    Taking Medication as prescribed: yes    Side Effects:  None    Medication Helping Symptoms:  yes     Patient had spinal stenosis decompression surgery on 7/23/18. Her surgeon prescribed Dilaudid for her post surgical pain through today. She has surgical follow up in 2 days. Surgeon will decide if the cervical collar will be removed.     From this point on, she resumes presurgical chronic pain meds. She plans to stay on the Fentanyl patch and Norco 4 tabs a day.     ROS:  Constitutional, HEENT, cardiovascular, pulmonary, gi and gu systems are negative, except as otherwise noted.         Current Outpatient Prescriptions on File Prior to Visit:  guaiFENesin (MUCINEX) 600 MG 12 hr tablet Take 1 tablet (600 mg) by mouth 2 times daily as needed for congestion   lisinopril-hydrochlorothiazide (PRINZIDE/ZESTORETIC) 10-12.5 MG per tablet Take 1 tablet by mouth daily   methocarbamol (ROBAXIN) 500 MG tablet Take 1-2 tablets (500-1,000 mg) by mouth 4 times daily as needed for muscle spasms   mometasone-formoterol (DULERA) 200-5 MCG/ACT oral inhaler Inhale 2 puffs into the lungs 2 times daily   tiotropium (SPIRIVA RESPIMAT) 2.5 MCG/ACT inhalation aerosol Inhale 2 puffs into the lungs daily   venlafaxine (EFFEXOR-XR) 150 MG 24 hr capsule Take 1 capsule (150 mg) by mouth daily   albuterol (PROAIR HFA/PROVENTIL HFA/VENTOLIN HFA) 108 (90 Base) MCG/ACT Inhaler Inhale 2 puffs into the lungs every 6 hours as needed for shortness of breath / dyspnea or wheezing   order for DME Equipment being ordered: [facility] - Blue - Size C   ORDER FOR DME Equipment being ordered: Oxygen 4 lpm via nasal cannula continuous flow with any exertion and 2 lpm continuous flow with nasal cannula at rest and at night. Provide portability.  Length of need 99.   polyethylene glycol (MIRALAX/GLYCOLAX)  powder MIX 17 GRAMS(ONE CAPFUL) IN LIQUID AND DRINK ONCE DAILY AS NEEDED FOR CONSTIPATION   tiZANidine (ZANAFLEX) 4 MG tablet Take 1 tablet (4 mg) by mouth 3 times daily   zolpidem (AMBIEN CR) 6.25 MG CR tablet TAKE 1 TABLET BY MOUTH NIGHTLY AS NEEDED FOR SLEEP   [DISCONTINUED] fluticasone-salmeterol (ADVAIR) 500-50 MCG/DOSE diskus inhaler Inhale 1 puff into the lungs 2 times daily   [DISCONTINUED] traZODone (DESYREL) 100 MG tablet Take 1.5-2 tablets (150-200 mg) by mouth nightly as needed for sleep     No current facility-administered medications on file prior to visit.        Patient Active Problem List   Diagnosis     Other acne     Chronic neck pain     Obesity     Major depression in partial remission     Fusion of spine of cervical region     CARDIOVASCULAR SCREENING; LDL GOAL LESS THAN 160     Depression with anxiety     Emphysema lung (H)     COPD, severe (H)     Former smoker     Chronic insomnia     Hypoxia     O2 dependent     Tobacco use disorder     Past Surgical History:   Procedure Laterality Date     C APPENDECTOMY       C  DELIVERY ONLY  ,,    , Low Cervical     C LIGATE FALLOPIAN TUBE      S/P tubal ligation     C TOTAL ABDOM HYSTERECTOMY      not total     pt did not have ovaries removed     HC ENLARGE BREAST WITH IMPLANT       SURGICAL HISTORY OF -   10/18/2005    C6 corpectomy with iliac crest autograft fixation. [facility]       Social History   Substance Use Topics     Smoking status: Current Every Day Smoker     Packs/day: 0.75     Years: 35.00     Types: Cigarettes     Smokeless tobacco: Never Used      Comment: started age 14.     Alcohol use 0.0 oz/week     0 Standard drinks or equivalent per week      Comment: occ     Family History   Problem Relation Age of Onset     Cerebrovascular Disease Mother      Chronic Obstructive Pulmonary Disease Mother      Arthritis Father      Diabetes Maternal Grandmother      Cancer Maternal Aunt       breast             Problem list, Medication list, Allergies, and Medical/Social/Surgical histories reviewed in EPIC and updated as appropriate.    OBJECTIVE:                                                    /78  Pulse 92  Temp 97.4  F (36.3  C) (Temporal)  Wt 193 lb (87.5 kg)  SpO2 96%  BMI 34.74 kg/m2    GENERAL: healthy, alert and no distress  HEENT: unremarkable  Neck: no adenopathy/mass/stiffness. Thyroid normal.  Lung: clear, no wheezing/rhonchi/crackles  Heart: RRR, normal S1/2, no murmur/gallop/rup  Abd: soft, normal BS, non tender, no organomegaly   Ext: no cyanosis/clubbing/edema      Diagnostic test results:  Results for orders placed or performed in visit on 07/05/18   Basic metabolic panel   Result Value Ref Range    Sodium 141 133 - 144 mmol/L    Potassium 4.4 3.4 - 5.3 mmol/L    Chloride 104 94 - 109 mmol/L    Carbon Dioxide 32 20 - 32 mmol/L    Anion Gap 5 3 - 14 mmol/L    Glucose 116 (H) 70 - 99 mg/dL    Urea Nitrogen 22 7 - 30 mg/dL    Creatinine 0.99 0.52 - 1.04 mg/dL    GFR Estimate 58 (L) >60 mL/min/1.7m2    GFR Estimate If Black 71 >60 mL/min/1.7m2    Calcium 9.5 8.5 - 10.1 mg/dL   CBC with platelets   Result Value Ref Range    WBC 9.2 4.0 - 11.0 10e9/L    RBC Count 4.79 3.8 - 5.2 10e12/L    Hemoglobin 14.1 11.7 - 15.7 g/dL    Hematocrit 43.8 35.0 - 47.0 %    MCV 91 78 - 100 fl    MCH 29.4 26.5 - 33.0 pg    MCHC 32.2 31.5 - 36.5 g/dL    RDW 13.6 10.0 - 15.0 %    Platelet Count 284 150 - 450 10e9/L   HIV Antigen Antibody Combo   Result Value Ref Range    HIV Antigen Antibody Combo Nonreactive NR^Nonreactive             ASSESSMENT/PLAN:                                                      53 year old female with the following diagnoses and treatment plan:      ICD-10-CM    1. Chronic neck pain M54.2 HYDROcodone-acetaminophen (NORCO)  MG per tablet    G89.29 fentaNYL (DURAGESIC) 100 mcg/hr 72 hr patch   2. Chronic insomnia F51.04 traZODone (DESYREL) 100 MG tablet   3. Fusion of  spine of cervical region M43.22 HYDROcodone-acetaminophen (NORCO)  MG per tablet     fentaNYL (DURAGESIC) 100 mcg/hr 72 hr patch   4. Long term current use of opiate analgesic Z79.891 HYDROcodone-acetaminophen (NORCO)  MG per tablet     fentaNYL (DURAGESIC) 100 mcg/hr 72 hr patch       -- resume chronic pain medication regimen. If stable, clinic follow up in 6 months.   -- if pt would need dose adjustment, will follow up sooner to discuss.     Will call or return to clinic if worsening or symptoms not improving as discussed.  See Patient Instructions.      Maki Topete MD-PhD  [facility]    (Note: Chart documentation was done in part with Dragon Voice Recognition software. Although reviewed after completion, some word and grammatical errors may remain.)

## 2018-08-21 NOTE — MR AVS SNAPSHOT
After Visit Summary   8/21/2018    Jaimee Rodriguez    MRN: [id number]           Patient Information     Date Of Birth          1965        Visit Information        Provider Department      8/21/2018 10:10 AM Maki Topete MD PhD [facility]        Today's Diagnoses     Chronic neck pain    -  1    Chronic insomnia        Fusion of spine of cervical region        Long term current use of opiate analgesic          Care Instructions    Make appointment(s) for:   -- return in 6 months for med review.         Medication(s) prescribed today:    Orders Placed This Encounter   Medications     traZODone (DESYREL) 100 MG tablet     Sig: Take 1.5-2 tablets (150-200 mg) by mouth nightly as needed for sleep     Dispense:  180 tablet     Refill:  3     HYDROcodone-acetaminophen (NORCO)  MG per tablet     Sig: Take 1 tablet by mouth every 6 hours as needed for pain     Dispense:  120 tablet     Refill:  0     fentaNYL (DURAGESIC) 100 mcg/hr 72 hr patch     Sig: Place 1 patch onto the skin every 48 hours (Mylan brand)     Dispense:  15 patch     Refill:  0                   Follow-ups after your visit        Follow-up notes from your care team     Return in about 6 months (around 2/21/2019) for Medication check.      Your next 10 appointments already scheduled     Feb 07, 2019  1:00 PM CST   Office Visit with PFT LAB   [facility])    [address] [place] [id number] [phone]           Bring a current list of meds and any records pertaining to this visit. For Physicals, please bring immunization records and any forms needing to be filled out. Please arrive 10 minutes early to complete paperwork.            Feb 07, 2019  2:00 PM CST   Return Visit with David Morris Perlman, MD   [facility])    [address] [place] [id number] [phone]              Who to contact      If you have questions or need follow up information about today's clinic visit or your schedule please contact [facility] directly at [phone].  Normal or non-critical lab and imaging results will be communicated to you by [employer], letter or phone within 4 business days after the clinic has received the results. If you do not hear from us within 7 days, please contact the clinic through [employer] or phone. If you have a critical or abnormal lab result, we will notify you by phone as soon as possible.  Submit refill requests through [employer] or call your pharmacy and they will forward the refill request to us. Please allow 3 business days for your refill to be completed.          Additional Information About Your Visit        [employer] Information     [employer] gives you secure access to your electronic health record. If you see a primary care provider, you can also send messages to your care team and make appointments. If you have questions, please call your primary care clinic.  If you do not have a primary care provider, please call [phone] and they will assist you.      [employer] is an electronic gateway that provides easy, online access to your medical records. With [employer], you can request a clinic appointment, read your test results, renew a prescription or communicate with your care team.     To access your existing account, please contact your [facility] or call [phone] for assistance.        Care EveryWhere ID     This is your Care EveryWhere ID. This could be used by other organizations to access your [place] medical records  [id number]        Your Vitals Were     Pulse Temperature Pulse Oximetry BMI (Body Mass Index)          92 97.4  F (36.3  C) (Temporal) 96% 34.74 kg/m2         Blood Pressure from Last 3 Encounters:   08/21/18 110/78   07/05/18 112/68   05/24/18 166/86    Weight from Last 3 Encounters:   08/21/18 193 lb (87.5 kg)   07/05/18  188 lb (85.3 kg)   05/24/18 193 lb (87.5 kg)              Today, you had the following     No orders found for display         Where to get your medicines      These medications were sent to [id number] - [place], [place] - [address] [facility] [address] [address]  [address] [facility] [address], [facility] [address]     Phone:  [phone]     traZODone 100 MG tablet         Some of these will need a paper prescription and others can be bought over the counter.  Ask your nurse if you have questions.     Bring a paper prescription for each of these medications     fentaNYL 100 mcg/hr 72 hr patch    HYDROcodone-acetaminophen  MG per tablet         Information about OPIOIDS     PRESCRIPTION OPIOIDS: WHAT YOU NEED TO KNOW   We gave you an opioid (narcotic) pain medicine. It is important to manage your pain, but opioids are not always the best choice. You should first try all the other options your care team gave you. Take this medicine for as short a time (and as few doses) as possible.    Some activities can increase your pain, such as bandage changes or therapy sessions. It may help to take your pain medicine 30 to 60 minutes before these activities. Reduce your stress by getting enough sleep, working on hobbies you enjoy and practicing relaxation or meditation. Talk to your care team about ways to manage your pain beyond prescription opioids.    These medicines have risks:    DO NOT drive when on new or higher doses of pain medicine. These medicines can affect your alertness and reaction times, and you could be arrested for driving under the influence (DUI). If you need to use opioids long-term, talk to your care team about driving.    DO NOT operate heavy machinery    DO NOT do any other dangerous activities while taking these medicines.    DO NOT drink any alcohol while taking these medicines.     If the opioid prescribed includes acetaminophen, DO NOT take with any other medicines that contain acetaminophen. Read all  labels carefully. Look for the word  acetaminophen  or  Tylenol.  Ask your pharmacist if you have questions or are unsure.    You can get addicted to pain medicines, especially if you have a history of addiction (chemical, alcohol or substance dependence). Talk to your care team about ways to reduce this risk.    All opioids tend to cause constipation. Drink plenty of water and eat foods that have a lot of fiber, such as fruits, vegetables, prune juice, apple juice and high-fiber cereal. Take a laxative (Miralax, milk of magnesia, Colace, Senna) if you don t move your bowels at least every other day. Other side effects include upset stomach, sleepiness, dizziness, throwing up, tolerance (needing more of the medicine to have the same effect), physical dependence and slowed breathing.    Store your pills in a secure place, locked if possible. We will not replace any lost or stolen medicine. If you don t finish your medicine, please throw away (dispose) as directed by your pharmacist. The [place] Pollution Control Agency has more information about safe disposal: https://[email or web address].[email or web address].us/living-green/managing-unwanted-medications         Primary Care Provider Office Phone # Fax #    Maki Topete MD PhD [phone] [address] [facility] [address]        Equal Access to Services     LEOBARDO PANDA : Hadmarylin coheno Soluzali, waaxda luqadaha, qaybta kaalmada adeegyada, morris cameron. So [facility] [phone].    ATENCIÓN: Si habla español, tiene a pepe disposición servicios gratuitos de asistencia lingüística. Llame al [phone].    We comply with applicable federal civil rights laws and [place] laws. We do not discriminate on the basis of race, color, national origin, age, disability, sex, sexual orientation, or gender identity.            Thank you!     Thank you for choosing [facility]  for your care. Our goal is always to provide you with  excellent care. Hearing back from our patients is one way we can continue to improve our services. Please take a few minutes to complete the written survey that you may receive in the mail after your visit with us. Thank you!             Your Updated Medication List - Protect others around you: Learn how to safely use, store and throw away your medicines at www.disposemymeds.org.          This list is accurate as of 8/21/18 10:42 AM.  Always use your most recent med list.                   Brand Name Dispense Instructions for use Diagnosis    albuterol 108 (90 Base) MCG/ACT inhaler    PROAIR HFA/PROVENTIL HFA/VENTOLIN HFA    1 Inhaler    Inhale 2 puffs into the lungs every 6 hours as needed for shortness of breath / dyspnea or wheezing    Obstructive chronic bronchitis with exacerbation (H)       fentaNYL 100 mcg/hr 72 hr patch    DURAGESIC    15 patch    Place 1 patch onto the skin every 48 hours (Mylan brand)    Chronic neck pain, Fusion of spine of cervical region, Long term current use of opiate analgesic       guaiFENesin 600 MG 12 hr tablet    MUCINEX    20 tablet    Take 1 tablet (600 mg) by mouth 2 times daily as needed for congestion    Pulmonary emphysema, unspecified emphysema type (H)       HYDROcodone-acetaminophen  MG per tablet    NORCO    120 tablet    Take 1 tablet by mouth every 6 hours as needed for pain    Chronic neck pain, Fusion of spine of cervical region, Long term current use of opiate analgesic       lisinopril-hydrochlorothiazide 10-12.5 MG per tablet    PRINZIDE/ZESTORETIC    90 tablet    Take 1 tablet by mouth daily    Hypertension goal BP (blood pressure) < 140/90       methocarbamol 500 MG tablet    ROBAXIN    90 tablet    Take 1-2 tablets (500-1,000 mg) by mouth 4 times daily as needed for muscle spasms    Chronic neck pain, Muscle spasm       mometasone-formoterol 200-5 MCG/ACT oral inhaler    DULERA    1 Inhaler    Inhale 2 puffs into the lungs 2 times daily    Pulmonary  emphysema, unspecified emphysema type (H)       order for DME     1 Device    Equipment being ordered: Oxygen 4 lpm via nasal cannula continuous flow with any exertion and 2 lpm continuous flow with nasal cannula at rest and at night. Provide portability.  Length of need 99.    COPD (chronic obstructive pulmonary disease) (H), Dyspnea       order for DME     1 Device    Equipment being ordered: Superfeet - Blue - Size C    Plantar fasciitis       polyethylene glycol powder    MIRALAX/GLYCOLAX    527 g    MIX 17 GRAMS(ONE CAPFUL) IN LIQUID AND DRINK ONCE DAILY AS NEEDED FOR CONSTIPATION    Drug-induced constipation       tiotropium 2.5 MCG/ACT inhalation aerosol    SPIRIVA RESPIMAT    12 g    Inhale 2 puffs into the lungs daily    Pulmonary emphysema, unspecified emphysema type (H)       tiZANidine 4 MG tablet    ZANAFLEX    90 tablet    Take 1 tablet (4 mg) by mouth 3 times daily    Muscle spasm       traZODone 100 MG tablet    DESYREL    180 tablet    Take 1.5-2 tablets (150-200 mg) by mouth nightly as needed for sleep    Chronic insomnia       venlafaxine 150 MG 24 hr capsule    EFFEXOR-XR    30 capsule    Take 1 capsule (150 mg) by mouth daily    Major depressive disorder, recurrent episode, mild (H)       zolpidem 6.25 MG CR tablet    AMBIEN CR    30 tablet    TAKE 1 TABLET BY MOUTH NIGHTLY AS NEEDED FOR SLEEP    Drug-induced constipation

## 2018-08-22 ASSESSMENT — PATIENT HEALTH QUESTIONNAIRE - PHQ9: SUM OF ALL RESPONSES TO PHQ QUESTIONS 1-9: 8

## 2018-08-22 ASSESSMENT — ANXIETY QUESTIONNAIRES: GAD7 TOTAL SCORE: 0

## 2018-08-23 ENCOUNTER — TRANSFERRED RECORDS (OUTPATIENT)
Dept: HEALTH INFORMATION MANAGEMENT | Facility: CLINIC | Age: 53
End: 2018-08-23

## 2018-09-04 ENCOUNTER — MYC MEDICAL ADVICE (OUTPATIENT)
Dept: PEDIATRICS | Facility: CLINIC | Age: 53
End: 2018-09-04

## 2018-09-04 DIAGNOSIS — M54.2 CHRONIC NECK PAIN: ICD-10-CM

## 2018-09-04 DIAGNOSIS — M43.22 FUSION OF SPINE OF CERVICAL REGION: ICD-10-CM

## 2018-09-04 DIAGNOSIS — Z98.1 STATUS POST CERVICAL SPINAL ARTHRODESIS: Primary | ICD-10-CM

## 2018-09-04 DIAGNOSIS — G89.29 CHRONIC NECK PAIN: ICD-10-CM

## 2018-09-04 DIAGNOSIS — Z79.891 LONG TERM CURRENT USE OF OPIATE ANALGESIC: ICD-10-CM

## 2018-09-04 NOTE — TELEPHONE ENCOUNTER
Routing to provider to please review and advise.     Patient has been taking every 4 hours instead of 6 hours for neck pain.     HYDROcodone-acetaminophen (NORCO)  MG per tablet 120 tablet 0 8/21/2018  No   Sig - Route: Take 1 tablet by mouth every 6 hours as needed for pain - Oral     Last OV with Dr. Topete: 8/21/18    No future apts.     Shanna Maria RN

## 2018-09-05 RX ORDER — HYDROCODONE BITARTRATE AND ACETAMINOPHEN 10; 325 MG/1; MG/1
1 TABLET ORAL EVERY 4 HOURS PRN
Qty: 180 TABLET | Refills: 0 | Status: SHIPPED | OUTPATIENT
Start: 2018-09-05 | End: 2018-10-15

## 2018-09-06 DIAGNOSIS — F33.0 MAJOR DEPRESSIVE DISORDER, RECURRENT EPISODE, MILD (H): ICD-10-CM

## 2018-09-07 RX ORDER — VENLAFAXINE HYDROCHLORIDE 150 MG/1
150 CAPSULE, EXTENDED RELEASE ORAL DAILY
Qty: 30 CAPSULE | Refills: 5 | Status: SHIPPED | OUTPATIENT
Start: 2018-09-07 | End: 2019-04-19

## 2018-09-07 NOTE — TELEPHONE ENCOUNTER
Routing refill request to provider for review/approval because:  PHQ-9 greater than 5.    PHQ-9 score:    PHQ-9 SCORE 8/21/2018   Total Score -   Total Score MyChart -   Total Score 8     Trudy Alaniz RN   [facility]

## 2018-09-19 ENCOUNTER — MYC REFILL (OUTPATIENT)
Dept: PEDIATRICS | Facility: CLINIC | Age: 53
End: 2018-09-19

## 2018-09-19 DIAGNOSIS — Z79.891 LONG TERM CURRENT USE OF OPIATE ANALGESIC: ICD-10-CM

## 2018-09-19 DIAGNOSIS — M43.22 FUSION OF SPINE OF CERVICAL REGION: ICD-10-CM

## 2018-09-19 DIAGNOSIS — M54.2 CHRONIC NECK PAIN: ICD-10-CM

## 2018-09-19 DIAGNOSIS — G89.29 CHRONIC NECK PAIN: ICD-10-CM

## 2018-09-19 NOTE — TELEPHONE ENCOUNTER
Routing refill request to provider for review/approval because:  Drug not on the [facility] refill protocol   **Patient's spouse Steve Rodriguez will be picking up script at     fentaNYL (DURAGESIC) 100 mcg/hr 72 hr patch 15 patch 0 8/21/2018  No   Sig - Route: Place 1 patch onto the skin every 48 hours (Mylan brand) - Transdermal     Last OV with Dr. Topete: 8/21/2018    No future apts.     Shanna Maria RN

## 2018-09-19 NOTE — TELEPHONE ENCOUNTER
Message from [employer]:  Original authorizing provider: Maki Topete MD PhD    Jaimee Rodriguez would like a refill of the following medications:  fentaNYL (DURAGESIC) 100 mcg/hr 72 hr patch [Maki Topete MD PhD]    Preferred pharmacy: [facility]    Comment:  Steve Rodriguez will  written script for me

## 2018-09-21 RX ORDER — FENTANYL 100 UG/1
1 PATCH TRANSDERMAL
Qty: 15 PATCH | Refills: 0 | Status: SHIPPED | OUTPATIENT
Start: 2018-09-21 | End: 2018-10-23

## 2018-09-21 NOTE — TELEPHONE ENCOUNTER
Patient advised script for fetaNYL placed at the  for  check in C.      Patient stated understanding and advised me her  Germán will be picking up the script and he will have his id.    Maribell Macias CMA

## 2018-10-15 ENCOUNTER — MYC REFILL (OUTPATIENT)
Dept: PEDIATRICS | Facility: CLINIC | Age: 53
End: 2018-10-15

## 2018-10-15 DIAGNOSIS — Z98.1 STATUS POST CERVICAL SPINAL ARTHRODESIS: ICD-10-CM

## 2018-10-15 DIAGNOSIS — M54.2 CHRONIC NECK PAIN: ICD-10-CM

## 2018-10-15 DIAGNOSIS — Z79.891 LONG TERM CURRENT USE OF OPIATE ANALGESIC: ICD-10-CM

## 2018-10-15 DIAGNOSIS — G89.29 CHRONIC NECK PAIN: ICD-10-CM

## 2018-10-15 DIAGNOSIS — M43.22 FUSION OF SPINE OF CERVICAL REGION: ICD-10-CM

## 2018-10-15 NOTE — TELEPHONE ENCOUNTER
Message from [employer]Suitet:  Original authorizing provider: Maki Topete MD PhD    Jaimee Rodriguez would like a refill of the following medications:  HYDROcodone-acetaminophen (NORCO)  MG per tablet [Maki Topete MD PhD]    Preferred pharmacy: [facility]    Comment:  My  Steve will  the written script.

## 2018-10-15 NOTE — TELEPHONE ENCOUNTER
Pending Prescriptions:                       Disp   Refills    HYDROcodone-acetaminophen (NORCO)  *180 ta*0            Sig: Take 1 tablet by mouth every 4 hours as needed           for pain (For 1 time increase only)      Last Written Prescription Date:  9/5/18  Last Fill Quantity: 180,  # refills: 0   Last office visit: 8/21/2018 with prescribing provider:  Dr. Maki Topete   Future Office Visit:      Routing refill request to provider for review/approval because:  Drug not on the [facility], [facility] or [facility] refill protocol or controlled substance

## 2018-10-16 RX ORDER — HYDROCODONE BITARTRATE AND ACETAMINOPHEN 10; 325 MG/1; MG/1
1 TABLET ORAL EVERY 4 HOURS PRN
Qty: 180 TABLET | Refills: 0 | Status: SHIPPED | OUTPATIENT
Start: 2018-10-16 | End: 2018-11-13

## 2018-10-23 ENCOUNTER — MYC REFILL (OUTPATIENT)
Dept: PEDIATRICS | Facility: CLINIC | Age: 53
End: 2018-10-23

## 2018-10-23 DIAGNOSIS — M54.2 CHRONIC NECK PAIN: ICD-10-CM

## 2018-10-23 DIAGNOSIS — M43.22 FUSION OF SPINE OF CERVICAL REGION: ICD-10-CM

## 2018-10-23 DIAGNOSIS — G89.29 CHRONIC NECK PAIN: ICD-10-CM

## 2018-10-23 DIAGNOSIS — Z79.891 LONG TERM CURRENT USE OF OPIATE ANALGESIC: ICD-10-CM

## 2018-10-23 RX ORDER — FENTANYL 100 UG/1
1 PATCH TRANSDERMAL
Qty: 15 PATCH | Refills: 0 | Status: SHIPPED | OUTPATIENT
Start: 2018-10-23 | End: 2018-11-13

## 2018-10-23 NOTE — TELEPHONE ENCOUNTER
fentaNYL (DURAGESIC) 100 mcg/hr 72 hr patch      Last Written Prescription Date:  09/21/18  Last Fill Quantity: 15 patches,   # refills: 0  Last Office Visit: 08/21/18  Future Office visit:       Routing refill request to provider for review/approval because:  Drug not on the [facility], [facility] or [facility] refill protocol or controlled substance.    Per [employer] message, patient's , Turner, will  script when completed.

## 2018-10-23 NOTE — TELEPHONE ENCOUNTER
fentaNYL (DURAGESIC) 100 mcg/hr 72 hr patch script placed at  for patient's spouse, Turner, to .    Sent patient MyChart reply stating refill request has been completed.  Sola Madrigal CMA

## 2018-10-23 NOTE — TELEPHONE ENCOUNTER
Message from RupeeTimeshart:  Original authorizing provider: Maki Topete MD PhD    Jaimee FITZPATRICKIlda Jennifer would like a refill of the following medications:  fentaNYL (DURAGESIC) 100 mcg/hr 72 hr patch [Maki Topete MD PhD]    Preferred pharmacy: [facility]    Comment:  My  Steve will  the written script

## 2018-11-08 ENCOUNTER — TRANSFERRED RECORDS (OUTPATIENT)
Dept: HEALTH INFORMATION MANAGEMENT | Facility: CLINIC | Age: 53
End: 2018-11-08

## 2018-11-08 DIAGNOSIS — J43.9 PULMONARY EMPHYSEMA, UNSPECIFIED EMPHYSEMA TYPE (H): ICD-10-CM

## 2018-11-08 RX ORDER — GUAIFENESIN 600 MG/1
600 TABLET, EXTENDED RELEASE ORAL 2 TIMES DAILY PRN
Qty: 20 TABLET | Refills: 3 | Status: SHIPPED | OUTPATIENT
Start: 2018-11-08 | End: 2019-01-30

## 2018-11-08 NOTE — TELEPHONE ENCOUNTER
Writer received a refill request from: Steve  in [facility].     Medication:      guaiFENesin (MUCINEX) 600 MG 12 hr tablet 20 tablet 3 8/13/2018  No     Sig - Route: Take 1 tablet (600 mg) by mouth 2 times daily as needed for congestion - Oral     Sig: Take 1 tab PO BID PRN  Date last written: 08/13/2018  Dispensed amount: 20  Refills: 3  Date last dispensed: 08/13/2018      Pt's last office visit: 02/22/2018  Next scheduled office visit: 02/21/2019      Per the RN/LPN medication refill protocol, writer is  to refill this request. If able to refill, please call the pt to inform them the RX was sent to the pharmacy. If unable to refill, route this encounter to the prescribing physician for authorization or further instructions.

## 2018-11-12 ENCOUNTER — TRANSFERRED RECORDS (OUTPATIENT)
Dept: HEALTH INFORMATION MANAGEMENT | Facility: CLINIC | Age: 53
End: 2018-11-12

## 2018-11-13 ENCOUNTER — MYC REFILL (OUTPATIENT)
Dept: PEDIATRICS | Facility: CLINIC | Age: 53
End: 2018-11-13

## 2018-11-13 DIAGNOSIS — Z79.891 LONG TERM CURRENT USE OF OPIATE ANALGESIC: ICD-10-CM

## 2018-11-13 DIAGNOSIS — M54.2 CHRONIC NECK PAIN: ICD-10-CM

## 2018-11-13 DIAGNOSIS — G89.29 CHRONIC NECK PAIN: ICD-10-CM

## 2018-11-13 DIAGNOSIS — M43.22 FUSION OF SPINE OF CERVICAL REGION: ICD-10-CM

## 2018-11-13 DIAGNOSIS — Z98.1 STATUS POST CERVICAL SPINAL ARTHRODESIS: ICD-10-CM

## 2018-11-13 RX ORDER — FENTANYL 100 UG/1
1 PATCH TRANSDERMAL
Qty: 15 PATCH | Refills: 0 | Status: SHIPPED | OUTPATIENT
Start: 2018-11-22 | End: 2018-12-16

## 2018-11-13 RX ORDER — HYDROCODONE BITARTRATE AND ACETAMINOPHEN 10; 325 MG/1; MG/1
1 TABLET ORAL EVERY 4 HOURS PRN
Qty: 180 TABLET | Refills: 0 | Status: SHIPPED | OUTPATIENT
Start: 2018-11-22 | End: 2018-12-16

## 2018-11-13 NOTE — TELEPHONE ENCOUNTER
Routing refill request to provider for review/approval because:  Drug not on the [facility] refill protocol     fentaNYL (DURAGESIC) 100 mcg/hr 72 hr patch 15 patch 0 10/23/2018  No   Sig - Route: Place 1 patch onto the skin every 48 hours (Mylan brand) - Transdermal     HYDROcodone-acetaminophen (NORCO)  MG per tablet 180 tablet 0 10/16/2018  No   Sig - Route: Take 1 tablet by mouth every 4 hours as needed for pain (For 1 time increase only) - Oral     Last OV with Dr. Topete: 8/21/18    No future apts.     Shanna Maria RN

## 2018-11-13 NOTE — TELEPHONE ENCOUNTER
Message from Kingland [employer]est:  Original authorizing provider: Maki Topete MD PhD    Jaimee NANETTEIlda Rodriguez would like a refill of the following medications:  HYDROcodone-acetaminophen (NORCO)  MG per tablet [Maki Topete MD PhD]  fentaNYL (DURAGESIC) 100 mcg/hr 72 hr patch [Maki Topete MD PhD]    Preferred pharmacy: [facility]    Comment:  I know I'm a bit early for the fentanyl patch I will hold script til next week. My  Dave Rodriguez will be picking up the written scripts.

## 2018-12-14 ENCOUNTER — TELEPHONE (OUTPATIENT)
Dept: PULMONOLOGY | Facility: CLINIC | Age: 53
End: 2018-12-14

## 2018-12-14 NOTE — TELEPHONE ENCOUNTER
Central Prior Authorization Team   Phone: [phone]    Prior Authorization Retail Medication Request    Medication/Dose: guaiFENesin (MUCINEX) 600 MG 12 hr tablet  ICD code (if different than what is on RX):  Pulmonary emphysema, unspecified emphysema type (H) [J43.9]   Previously Tried and Failed:    Rationale:      Insurance Name:  [facility]  Insurance ID:  [id number]      Pharmacy Information (if different than what is on RX)  Name:  Steve Romano  Phone:  [phone]

## 2018-12-16 ENCOUNTER — MYC REFILL (OUTPATIENT)
Dept: PEDIATRICS | Facility: CLINIC | Age: 53
End: 2018-12-16

## 2018-12-16 ENCOUNTER — MYC REFILL (OUTPATIENT)
Dept: OTHER | Age: 53
End: 2018-12-16

## 2018-12-16 DIAGNOSIS — M43.22 FUSION OF SPINE OF CERVICAL REGION: ICD-10-CM

## 2018-12-16 DIAGNOSIS — Z79.891 LONG TERM CURRENT USE OF OPIATE ANALGESIC: ICD-10-CM

## 2018-12-16 DIAGNOSIS — M54.2 CHRONIC NECK PAIN: ICD-10-CM

## 2018-12-16 DIAGNOSIS — M72.2 PLANTAR FASCIITIS: ICD-10-CM

## 2018-12-16 DIAGNOSIS — Z98.1 STATUS POST CERVICAL SPINAL ARTHRODESIS: ICD-10-CM

## 2018-12-16 DIAGNOSIS — G89.29 CHRONIC NECK PAIN: ICD-10-CM

## 2018-12-16 RX ORDER — FENTANYL 100 UG/1
1 PATCH TRANSDERMAL
Qty: 15 PATCH | Refills: 0 | Status: CANCELLED | OUTPATIENT
Start: 2018-12-16

## 2018-12-16 RX ORDER — HYDROCODONE BITARTRATE AND ACETAMINOPHEN 10; 325 MG/1; MG/1
1 TABLET ORAL EVERY 4 HOURS PRN
Qty: 180 TABLET | Refills: 0 | Status: CANCELLED | OUTPATIENT
Start: 2018-12-16

## 2018-12-17 NOTE — TELEPHONE ENCOUNTER
Spoke with patient and pharmacy. Medication was already approved and paid for by patient's other [facility] plan. Patient picked up on 12/9/18.     Rose Mcghee RN   Pulmonary/Rheumatology Care Coordinator  [facility]

## 2018-12-17 NOTE — TELEPHONE ENCOUNTER
Please see [employer] request for [employer].   Last office visit: 11/25/2014    DANO Lane RN

## 2018-12-17 NOTE — TELEPHONE ENCOUNTER
PRIOR AUTHORIZATION DENIED    Medication: guaiFENesin (MUCINEX) 600 MG 12 hr tablet - DENIED Excluded Med    Denial Date: 12/17/2018    Denial Rational:     The medication requested is excluded from the patients plan. Patient may receive medication, but would have to pay out of pocket.    Appeal Information: N/A

## 2018-12-17 NOTE — TELEPHONE ENCOUNTER
PA Initiation    Medication: guaiFENesin (MUCINEX) 600 MG 12 hr tablet -   Insurance Company: [employer] - Phone [phone] Fax [phone]  Pharmacy Filling the Rx: [facility] [id number] [place] [address]  Filling Pharmacy Phone: [phone]  Filling Pharmacy Fax: [phone]  Start Date: 12/17/2018

## 2018-12-18 NOTE — TELEPHONE ENCOUNTER
Routing refill request to provider for review/approval because:  Drug not on the [facility] refill protocol      Disp Refills Start End TAMMY   HYDROcodone-acetaminophen (NORCO)  MG per tablet 180 tablet 0 11/22/2018  No   Sig - Route: Take 1 tablet by mouth every 4 hours as needed for pain (For 1 time increase only) - Oral      Disp Refills Start End TAMMY   fentaNYL (DURAGESIC) 100 mcg/hr 72 hr patch 15 patch 0 11/22/2018  No   Sig - Route: Place 1 patch onto the skin every 48 hours (Mylan brand) - Transdermal     Last OV with Dr. Topete: 8/21/18    Next 5 appointments (look out 90 days)    Feb 21, 2019  1:00 PM CST  Office Visit with PFT LAB  [facility]) [address] [place] [id number] [phone]   Feb 21, 2019  2:00 PM CST  Return Visit with David Morris Perlman, MD  [facility]) [address] [place] [id number] [phone]        Shanna Maria RN

## 2018-12-19 RX ORDER — FENTANYL 100 UG/1
1 PATCH TRANSDERMAL
Qty: 15 PATCH | Refills: 0 | Status: SHIPPED | OUTPATIENT
Start: 2018-12-22 | End: 2019-01-21

## 2018-12-19 RX ORDER — HYDROCODONE BITARTRATE AND ACETAMINOPHEN 10; 325 MG/1; MG/1
1 TABLET ORAL EVERY 4 HOURS PRN
Qty: 180 TABLET | Refills: 0 | Status: SHIPPED | OUTPATIENT
Start: 2018-12-21 | End: 2019-01-21

## 2018-12-20 NOTE — TELEPHONE ENCOUNTER
Received Get[employer]ippy message from Call Center staff. Patient asking status of prescription refill request. Patient stated her  will be picking up completed prescriptions. Patient was advised to have  bring photo ID with him when picking up the prescriptions.    fentaNYL (DURAGESIC) 100 mcg/hr 72 hr patch and HYDROcodone-acetaminophen (NORCO)  MG per tablet scripts placed at  for patient's , Germán Rodriguez, to .  Sola Madrigal CMA

## 2019-01-21 ENCOUNTER — MYC REFILL (OUTPATIENT)
Dept: PEDIATRICS | Facility: CLINIC | Age: 54
End: 2019-01-21

## 2019-01-21 DIAGNOSIS — M43.22 FUSION OF SPINE OF CERVICAL REGION: ICD-10-CM

## 2019-01-21 DIAGNOSIS — G89.29 CHRONIC NECK PAIN: ICD-10-CM

## 2019-01-21 DIAGNOSIS — Z98.1 STATUS POST CERVICAL SPINAL ARTHRODESIS: ICD-10-CM

## 2019-01-21 DIAGNOSIS — M54.2 CHRONIC NECK PAIN: ICD-10-CM

## 2019-01-21 DIAGNOSIS — Z79.891 LONG TERM CURRENT USE OF OPIATE ANALGESIC: ICD-10-CM

## 2019-01-21 NOTE — TELEPHONE ENCOUNTER
Pending Prescriptions:                       Disp   Refills    HYDROcodone-acetaminophen (NORCO)  *180 ta*0            Sig: Take 1 tablet by mouth every 4 hours as needed           for pain (For 1 time increase only)    fentaNYL (DURAGESIC) 100 mcg/hr 72 hr pat*15 pat*0            Sig: Place 1 patch onto the skin every 48 hours (Mylan           brand)    HYDROcodone-acetaminophen (NORCO)  MG per tablet  Last Written Prescription Date:  12/21/18  Last Fill Quantity: 180,  # refills: 0     fentaNYL (DURAGESIC) 100 mcg/hr 72 hr patch  Last Written Prescription Date:  12/22/18  Last Fill Quantity: 15 patch,  # refills: 0   Last office visit: 8/21/2018 with prescribing provider:  Dr. Maki Topete   Future Office Visit:   Next 5 appointments (look out 90 days)    Feb 21, 2019  1:00 PM CST  [facility] with PFT LAB  [facility] [address] [place] [phone]  [phone]   Feb 21, 2019  2:00 PM CST  Return Visit with David Morris Perlman, MD  [facility] ([facility]) [address] [place] [phone]  [phone]           Routing refill request to provider for review/approval because:  Drug not on the [facility], [facility] or [facility] refill protocol or controlled substance

## 2019-01-23 RX ORDER — FENTANYL 100 UG/1
1 PATCH TRANSDERMAL
Qty: 15 PATCH | Refills: 0 | Status: SHIPPED | OUTPATIENT
Start: 2019-01-23 | End: 2019-02-05

## 2019-01-23 RX ORDER — HYDROCODONE BITARTRATE AND ACETAMINOPHEN 10; 325 MG/1; MG/1
1 TABLET ORAL EVERY 4 HOURS PRN
Qty: 180 TABLET | Refills: 0 | Status: SHIPPED | OUTPATIENT
Start: 2019-01-23 | End: 2019-02-05

## 2019-01-24 NOTE — TELEPHONE ENCOUNTER
[facility] Call Center    Phone Message    May a detailed message be left on voicemail: yes    Reason for Call: Other: Pt called in requesting to speak to someone about the status of the medication requests. Pt states ok to leave detailed message. Please advise.      Action Taken: Message routed to:  Primary Care p [id number]

## 2019-01-30 DIAGNOSIS — J43.9 PULMONARY EMPHYSEMA, UNSPECIFIED EMPHYSEMA TYPE (H): ICD-10-CM

## 2019-01-30 RX ORDER — GUAIFENESIN 600 MG/1
600 TABLET, EXTENDED RELEASE ORAL 2 TIMES DAILY PRN
Qty: 20 TABLET | Refills: 0 | Status: SHIPPED | OUTPATIENT
Start: 2019-01-30 | End: 2019-04-08

## 2019-01-30 NOTE — TELEPHONE ENCOUNTER
Writer received a refill request from: Steve  in [place].     Pending Prescriptions:                       Disp   Refills    guaiFENesin (MUCINEX) 600 MG 12 hr tablet 20 tab*3            Sig: Take 1 tablet (600 mg) by mouth 2 times daily as           needed for congestion      Pt's last office visit: 2/22/18  Next scheduled office visit: 2/21/19      Per the RN/LPN medication refill protocol, writer is able to refill this request. If able to refill, please call the pt to inform them the RX was sent to the pharmacy. If unable to refill, route this encounter to the prescribing physician for authorization or further instructions.    Refilled x1 until appt next month    Dona Newman LPN

## 2019-02-05 ENCOUNTER — OFFICE VISIT (OUTPATIENT)
Dept: PEDIATRICS | Facility: CLINIC | Age: 54
End: 2019-02-05
Payer: COMMERCIAL

## 2019-02-05 VITALS
SYSTOLIC BLOOD PRESSURE: 112 MMHG | TEMPERATURE: 98 F | BODY MASS INDEX: 34.02 KG/M2 | HEART RATE: 89 BPM | DIASTOLIC BLOOD PRESSURE: 69 MMHG | WEIGHT: 189 LBS | OXYGEN SATURATION: 94 %

## 2019-02-05 DIAGNOSIS — M43.22 FUSION OF SPINE OF CERVICAL REGION: ICD-10-CM

## 2019-02-05 DIAGNOSIS — J20.9 ACUTE BRONCHITIS, UNSPECIFIED ORGANISM: ICD-10-CM

## 2019-02-05 DIAGNOSIS — I10 HYPERTENSION GOAL BP (BLOOD PRESSURE) < 140/90: Primary | ICD-10-CM

## 2019-02-05 DIAGNOSIS — Z13.220 LIPID SCREENING: ICD-10-CM

## 2019-02-05 DIAGNOSIS — Z79.891 LONG TERM CURRENT USE OF OPIATE ANALGESIC: ICD-10-CM

## 2019-02-05 DIAGNOSIS — Z98.1 STATUS POST CERVICAL SPINAL ARTHRODESIS: ICD-10-CM

## 2019-02-05 DIAGNOSIS — J44.1 COPD EXACERBATION (H): ICD-10-CM

## 2019-02-05 DIAGNOSIS — M54.2 CHRONIC NECK PAIN: ICD-10-CM

## 2019-02-05 DIAGNOSIS — G89.29 CHRONIC NECK PAIN: ICD-10-CM

## 2019-02-05 PROCEDURE — 99000 SPECIMEN HANDLING OFFICE-LAB: CPT | Performed by: INTERNAL MEDICINE

## 2019-02-05 PROCEDURE — 99214 OFFICE O/P EST MOD 30 MIN: CPT | Performed by: INTERNAL MEDICINE

## 2019-02-05 PROCEDURE — 80307 DRUG TEST PRSMV CHEM ANLYZR: CPT | Mod: 90 | Performed by: INTERNAL MEDICINE

## 2019-02-05 RX ORDER — HYDROCODONE BITARTRATE AND ACETAMINOPHEN 10; 325 MG/1; MG/1
1 TABLET ORAL EVERY 4 HOURS PRN
Qty: 180 TABLET | Refills: 0 | Status: SHIPPED | OUTPATIENT
Start: 2019-04-23 | End: 2019-06-20

## 2019-02-05 RX ORDER — FENTANYL 100 UG/1
1 PATCH TRANSDERMAL
Qty: 15 PATCH | Refills: 0 | Status: SHIPPED | OUTPATIENT
Start: 2019-02-19 | End: 2019-05-18

## 2019-02-05 RX ORDER — HYDROCODONE BITARTRATE AND ACETAMINOPHEN 10; 325 MG/1; MG/1
1 TABLET ORAL EVERY 4 HOURS PRN
Qty: 180 TABLET | Refills: 0 | Status: SHIPPED | OUTPATIENT
Start: 2019-03-24 | End: 2019-06-20

## 2019-02-05 RX ORDER — HYDROCODONE BITARTRATE AND ACETAMINOPHEN 10; 325 MG/1; MG/1
1 TABLET ORAL EVERY 4 HOURS PRN
Qty: 180 TABLET | Refills: 0 | Status: SHIPPED | OUTPATIENT
Start: 2019-02-19 | End: 2019-05-18

## 2019-02-05 RX ORDER — FENTANYL 100 UG/1
1 PATCH TRANSDERMAL
Qty: 15 PATCH | Refills: 0 | Status: SHIPPED | OUTPATIENT
Start: 2019-04-23 | End: 2019-06-20

## 2019-02-05 RX ORDER — AZITHROMYCIN 250 MG/1
TABLET, FILM COATED ORAL
Qty: 6 TABLET | Refills: 0 | Status: SHIPPED | OUTPATIENT
Start: 2019-02-05 | End: 2019-04-08

## 2019-02-05 RX ORDER — PREDNISONE 20 MG/1
40 TABLET ORAL DAILY
Qty: 10 TABLET | Refills: 0 | Status: SHIPPED | OUTPATIENT
Start: 2019-02-05 | End: 2019-04-08

## 2019-02-05 RX ORDER — LISINOPRIL/HYDROCHLOROTHIAZIDE 10-12.5 MG
1 TABLET ORAL DAILY
Qty: 90 TABLET | Refills: 3 | Status: SHIPPED | OUTPATIENT
Start: 2019-02-05 | End: 2020-02-20

## 2019-02-05 RX ORDER — FENTANYL 100 UG/1
1 PATCH TRANSDERMAL
Qty: 15 PATCH | Refills: 0 | Status: SHIPPED | OUTPATIENT
Start: 2019-03-24 | End: 2019-06-20

## 2019-02-05 NOTE — PATIENT INSTRUCTIONS
Make appointment(s) for:   -- get urine test today.   -- clinic follow up in July with fasting labs.         Medication(s) prescribed today:    Orders Placed This Encounter   Medications     azithromycin (ZITHROMAX) 250 MG tablet     Si tablets on day one, then 1 tablet daily for 4 more days.     Dispense:  6 tablet     Refill:  0     predniSONE (DELTASONE) 20 MG tablet     Sig: Take 40 mg by mouth daily for 5 days.     Dispense:  10 tablet     Refill:  0     fentaNYL (DURAGESIC) 100 mcg/hr 72 hr patch     Sig: Place 1 patch onto the skin every 48 hours (Mylan brand)     Dispense:  15 patch     Refill:  0     HYDROcodone-acetaminophen (NORCO)  MG per tablet     Sig: Take 1 tablet by mouth every 4 hours as needed for pain (For 1 time increase only)     Dispense:  180 tablet     Refill:  0     lisinopril-hydrochlorothiazide (PRINZIDE/ZESTORETIC) 10-12.5 MG tablet     Sig: Take 1 tablet by mouth daily     Dispense:  90 tablet     Refill:  3     HYDROcodone-acetaminophen (NORCO)  MG per tablet     Sig: Take 1 tablet by mouth every 4 hours as needed for pain (For 1 time increase only)     Dispense:  180 tablet     Refill:  0     fentaNYL (DURAGESIC) 100 mcg/hr 72 hr patch     Sig: Place 1 patch onto the skin every 48 hours (Mylan brand)     Dispense:  15 patch     Refill:  0     HYDROcodone-acetaminophen (NORCO)  MG per tablet     Sig: Take 1 tablet by mouth every 4 hours as needed for pain (For 1 time increase only)     Dispense:  180 tablet     Refill:  0     fentaNYL (DURAGESIC) 100 mcg/hr 72 hr patch     Sig: Place 1 patch onto the skin every 48 hours (Mylan brand)     Dispense:  15 patch     Refill:  0

## 2019-02-05 NOTE — PROGRESS NOTES
SUBJECTIVE:   Jaimee Rodriguez is a 53 year old female who presents to clinic today for the following health issues:      Medication Followup of pain med, also Yearly letter for insurance for medications. (Prior Auth?)    Taking Medication as prescribed: yes    Side Effects:  None    Medication Helping Symptoms:  yes     Chronic pain: Patient has a history of chronic neck pain, status post cervical spine fusion surgery x2, and decompression surgery last year.  She has been evaluated at the pain clinic in the past, failed pain pump.  She has been maintained on fentanyl patch and Vicodin, and transition to maintaining dose to primary care.  She has been quite stable with the current dose.  Her refills this consistent with her [place] .    She needs to get the prescription with a fill date 3 days earlier than her due date in order to allow prior authorization process to go through per her insurance company.    RESPIRATORY SYMPTOMS      Duration: 2 weeks    Description  nasal congestion, cough, wheezing and fatigue/malaise    Severity: moderate    Accompanying signs and symptoms: None    History (predisposing factors):  none    Precipitating or alleviating factors: None    Therapies tried and outcome:  oral decongestant      History of COPD, followed by pulmonology.  She is on Dulera ipratropium and pro-air.  She started upper respiratory infection about 2 weeks ago.  This is gotten progressively worse.   Denies any fevers. Cough is productive.    ROS:  Constitutional, HEENT, cardiovascular, pulmonary, gi and gu systems are negative, except as otherwise noted.         Current Outpatient Medications on File Prior to Visit:  albuterol (PROAIR HFA/PROVENTIL HFA/VENTOLIN HFA) 108 (90 Base) MCG/ACT Inhaler Inhale 2 puffs into the lungs every 6 hours as needed for shortness of breath / dyspnea or wheezing   guaiFENesin (MUCINEX) 600 MG 12 hr tablet Take 1 tablet (600 mg) by mouth 2 times daily as needed for congestion    mometasone-formoterol (DULERA) 200-5 MCG/ACT oral inhaler Inhale 2 puffs into the lungs 2 times daily   order for DME Equipment being ordered: Peek Kidseet - Blue - Size C (2 pairs please)   ORDER FOR DME Equipment being ordered: Oxygen 4 lpm via nasal cannula continuous flow with any exertion and 2 lpm continuous flow with nasal cannula at rest and at night. Provide portability.  Length of need 99.   tiotropium (SPIRIVA RESPIMAT) 2.5 MCG/ACT inhalation aerosol Inhale 2 puffs into the lungs daily   tiZANidine (ZANAFLEX) 4 MG tablet Take 1 tablet (4 mg) by mouth 3 times daily   traZODone (DESYREL) 100 MG tablet Take 1.5-2 tablets (150-200 mg) by mouth nightly as needed for sleep   methocarbamol (ROBAXIN) 500 MG tablet Take 1-2 tablets (500-1,000 mg) by mouth 4 times daily as needed for muscle spasms (Patient not taking: Reported on 2/5/2019)   polyethylene glycol (MIRALAX/GLYCOLAX) powder MIX 17 GRAMS(ONE CAPFUL) IN LIQUID AND DRINK ONCE DAILY AS NEEDED FOR CONSTIPATION (Patient not taking: Reported on 2/5/2019)   venlafaxine (EFFEXOR-XR) 150 MG 24 hr capsule Take 1 capsule (150 mg) by mouth daily (Patient not taking: Reported on 2/5/2019)   zolpidem (AMBIEN CR) 6.25 MG CR tablet TAKE 1 TABLET BY MOUTH NIGHTLY AS NEEDED FOR SLEEP (Patient not taking: Reported on 2/5/2019)   [DISCONTINUED] fluticasone-salmeterol (ADVAIR) 500-50 MCG/DOSE diskus inhaler Inhale 1 puff into the lungs 2 times daily     No current facility-administered medications on file prior to visit.        Patient Active Problem List   Diagnosis     Other acne     Chronic neck pain     Obesity     Major depression in partial remission     Fusion of spine of cervical region     CARDIOVASCULAR SCREENING; LDL GOAL LESS THAN 160     Depression with anxiety     Emphysema lung (H)     COPD, severe (H)     Former smoker     Chronic insomnia     Hypoxia     O2 dependent     Tobacco use disorder     Past Surgical History:   Procedure Laterality Date     C  APPENDECTOMY  1988     C  DELIVERY ONLY  ,,    , Low Cervical     C LIGATE FALLOPIAN TUBE      S/P tubal ligation     C TOTAL ABDOM HYSTERECTOMY      not total     pt did not have ovaries removed     HC ENLARGE BREAST WITH IMPLANT       SURGICAL HISTORY OF -   10/18/2005    C6 corpectomy with iliac crest autograft fixation. [facility]       Social History     Tobacco Use     Smoking status: Current Every Day Smoker     Packs/day: 0.75     Years: 35.00     Pack years: 26.25     Types: Cigarettes     Smokeless tobacco: Never Used     Tobacco comment: started age 14.   Substance Use Topics     Alcohol use: Yes     Alcohol/week: 0.0 oz     Comment: occ     Family History   Problem Relation Age of Onset     Cerebrovascular Disease Mother      Chronic Obstructive Pulmonary Disease Mother      Arthritis Father      Diabetes Maternal Grandmother      Cancer Maternal Aunt         breast             Problem list, Medication list, Allergies, and Medical/Social/Surgical histories reviewed in [facility] and updated as appropriate.    OBJECTIVE:                                                    /69   Pulse 89   Temp 98  F (36.7  C) (Temporal)   Wt 85.7 kg (189 lb)   SpO2 94%   BMI 34.02 kg/m      GENERAL: healthy, alert and no distress  HEENT: no congestion  Lung: decreased air movement, no wheezing or crackles, harsh persistent cough  Heart: RRR, normal S1/2, no murmur/gallop/rup        Diagnostic test results:        ASSESSMENT/PLAN:                                                      53 year old female with the following diagnoses and treatment plan:      ICD-10-CM    1. Acute bronchitis, unspecified organism J20.9 azithromycin (ZITHROMAX) 250 MG tablet   2. COPD exacerbation (H) J44.1 predniSONE (DELTASONE) 20 MG tablet   3. Chronic neck pain M54.2 fentaNYL (DURAGESIC) 100 mcg/hr 72 hr patch    G89.29 HYDROcodone-acetaminophen (NORCO)  MG per tablet      HYDROcodone-acetaminophen (NORCO)  MG per tablet     fentaNYL (DURAGESIC) 100 mcg/hr 72 hr patch     HYDROcodone-acetaminophen (NORCO)  MG per tablet     fentaNYL (DURAGESIC) 100 mcg/hr 72 hr patch     Drug  Screen Comprehensive , Urine with Reported Meds (MedTox) (Pain Care Package)   4. Fusion of spine of cervical region M43.22 fentaNYL (DURAGESIC) 100 mcg/hr 72 hr patch     HYDROcodone-acetaminophen (NORCO)  MG per tablet     HYDROcodone-acetaminophen (NORCO)  MG per tablet     fentaNYL (DURAGESIC) 100 mcg/hr 72 hr patch     HYDROcodone-acetaminophen (NORCO)  MG per tablet     fentaNYL (DURAGESIC) 100 mcg/hr 72 hr patch     Drug  Screen Comprehensive , Urine with Reported Meds (MedTox) (Pain Care Package)   5. Long term current use of opiate analgesic Z79.891 fentaNYL (DURAGESIC) 100 mcg/hr 72 hr patch     HYDROcodone-acetaminophen (NORCO)  MG per tablet     HYDROcodone-acetaminophen (NORCO)  MG per tablet     fentaNYL (DURAGESIC) 100 mcg/hr 72 hr patch     HYDROcodone-acetaminophen (NORCO)  MG per tablet     fentaNYL (DURAGESIC) 100 mcg/hr 72 hr patch     Drug  Screen Comprehensive , Urine with Reported Meds (MedTox) (Pain Care Package)   6. Status post cervical spinal arthrodesis Z98.1 HYDROcodone-acetaminophen (NORCO)  MG per tablet     HYDROcodone-acetaminophen (NORCO)  MG per tablet     HYDROcodone-acetaminophen (NORCO)  MG per tablet   7. Hypertension goal BP (blood pressure) < 140/90 I10 lisinopril-hydrochlorothiazide (PRINZIDE/ZESTORETIC) 10-12.5 MG tablet       -- acute bronchitis with COPD exacerbation: zpak and prednisone. Continue her inhalers.   -- chronic pain: urine drug screen. rx for the next 3 months. The February script written for 3 days earlier than expected refill.   -- HTN: controlled.lisinopril/hydrochlorothiazide  Refilled.   -- return in July for annual wellness and fasting labs.     Will call or return to clinic if  worsening or symptoms not improving as discussed.  See Patient Instructions.      Maki Topete MD-PhD  [facility]    (Note: Chart documentation was done in part with Dragon Voice Recognition software. Although reviewed after completion, some word and grammatical errors may remain.)

## 2019-02-06 ASSESSMENT — ANXIETY QUESTIONNAIRES
2. NOT BEING ABLE TO STOP OR CONTROL WORRYING: NOT AT ALL
3. WORRYING TOO MUCH ABOUT DIFFERENT THINGS: NOT AT ALL
1. FEELING NERVOUS, ANXIOUS, OR ON EDGE: NOT AT ALL
5. BEING SO RESTLESS THAT IT IS HARD TO SIT STILL: NOT AT ALL
GAD7 TOTAL SCORE: 0
6. BECOMING EASILY ANNOYED OR IRRITABLE: NOT AT ALL
7. FEELING AFRAID AS IF SOMETHING AWFUL MIGHT HAPPEN: NOT AT ALL

## 2019-02-06 ASSESSMENT — PATIENT HEALTH QUESTIONNAIRE - PHQ9
SUM OF ALL RESPONSES TO PHQ QUESTIONS 1-9: 11
5. POOR APPETITE OR OVEREATING: NOT AT ALL

## 2019-02-08 LAB — PAIN DRUG SCR UR W RPTD MEDS: NORMAL

## 2019-02-08 ASSESSMENT — ANXIETY QUESTIONNAIRES: GAD7 TOTAL SCORE: 0

## 2019-02-12 NOTE — RESULT ENCOUNTER NOTE
Dear Jaimee,   Your recent test result are within acceptable range or at baseline. Please continue with your current plan of care.       Please call or Mychart to our office if you have further questions.     Maki Topete MD-PhD

## 2019-02-15 ENCOUNTER — TELEPHONE (OUTPATIENT)
Dept: PULMONOLOGY | Facility: CLINIC | Age: 54
End: 2019-02-15

## 2019-02-15 NOTE — TELEPHONE ENCOUNTER
Contacted patient to inform her of schedule change; she requested I leave this info on a voicemail, which I did for her. She knows the number to call back if she has questions.    Please note she is still scheduled in our PFT Lab at  on 2/21/19- wasn't sure if this appt should be kept or cancelled? Will have Pulm Team address. Denise Martinez RN

## 2019-02-15 NOTE — TELEPHONE ENCOUNTER
Contacted [facility] to schedule patient for a Methacholine Bronchial Challenge.     This test was ordered by Dr. Perlman, unfortunately, it is not something that can be completed in [place].     Patient has been scheduled for a Methacholine Bronchial Challenge at the [facility] Tuesday 02/19/2019 at 1pm. The appointment takes 90 mins. The location of the Methacholine Bronchial Challenge in the [facility] is [address] [place] [address]. Located on the 3rd floor. The telephone number is [phone].     Will call to inform patient of schedule change.    Rodriguez Santos LPN    Rheumatology / Pulmonology  [facility]

## 2019-02-18 NOTE — TELEPHONE ENCOUNTER
Patient scheduled for Methacholine Challenge test Tuesday 02/19/2019 at the [facility].    Message sent to MARIBEL Anaya from Dr. Perlman stating that patient does not need Methacholine Challenge Test completed.     Left voice mail for patient informing her of this information. Appointment for Methacholine Challenge Test at the [facility] has been cancelled.    [employer] message sent informing patient of the schedule changes.    Rodriguez Santos LPN    Rheumatology / Pulmonology  [facility]

## 2019-02-21 ENCOUNTER — OFFICE VISIT (OUTPATIENT)
Dept: PULMONOLOGY | Facility: CLINIC | Age: 54
End: 2019-02-21
Payer: COMMERCIAL

## 2019-02-21 ENCOUNTER — OFFICE VISIT (OUTPATIENT)
Dept: NURSING | Facility: CLINIC | Age: 54
End: 2019-02-21
Payer: COMMERCIAL

## 2019-02-21 VITALS
OXYGEN SATURATION: 93 % | HEART RATE: 80 BPM | SYSTOLIC BLOOD PRESSURE: 128 MMHG | BODY MASS INDEX: 35.11 KG/M2 | HEIGHT: 62 IN | TEMPERATURE: 97.8 F | DIASTOLIC BLOOD PRESSURE: 63 MMHG | WEIGHT: 190.8 LBS

## 2019-02-21 DIAGNOSIS — J43.9 PULMONARY EMPHYSEMA, UNSPECIFIED EMPHYSEMA TYPE (H): ICD-10-CM

## 2019-02-21 DIAGNOSIS — J44.9 CHRONIC OBSTRUCTIVE PULMONARY DISEASE, UNSPECIFIED COPD TYPE (H): Primary | ICD-10-CM

## 2019-02-21 PROCEDURE — 99214 OFFICE O/P EST MOD 30 MIN: CPT | Mod: 25 | Performed by: INTERNAL MEDICINE

## 2019-02-21 PROCEDURE — 94729 DIFFUSING CAPACITY: CPT | Performed by: INTERNAL MEDICINE

## 2019-02-21 PROCEDURE — 94375 RESPIRATORY FLOW VOLUME LOOP: CPT | Performed by: INTERNAL MEDICINE

## 2019-02-21 ASSESSMENT — PAIN SCALES - GENERAL: PAINLEVEL: NO PAIN (0)

## 2019-02-21 ASSESSMENT — MIFFLIN-ST. JEOR: SCORE: 1418.71

## 2019-02-21 NOTE — NURSING NOTE
Patient is here for a recheck. She currently uses oxygen 3L at rest, 4 L with exertion. She is currently smoking 1/2 pack of cigarettes per day.     Patient recently finished Prednisone burst for a cold, Dr. Topete prescribed it.     Rose Mcghee RN   Pulmonary/Rheumatology Care Coordinator  [facility]

## 2019-02-22 LAB
DLCOUNC-%PRED-PRE: 51 %
DLCOUNC-PRE: 10.2 ML/MIN/MMHG
DLCOUNC-PRED: 19.68 ML/MIN/MMHG
ERV-%PRED-PRE: 102 %
ERV-PRE: 0.51 L
ERV-PRED: 0.5 L
EXPTIME-PRE: 9.56 SEC
FEF2575-%PRED-PRE: 15 %
FEF2575-PRE: 0.38 L/SEC
FEF2575-PRED: 2.46 L/SEC
FEFMAX-%PRED-PRE: 58 %
FEFMAX-PRE: 3.69 L/SEC
FEFMAX-PRED: 6.36 L/SEC
FEV1-%PRED-PRE: 43 %
FEV1-PRE: 1.1 L
FEV1FEV6-PRE: 50 %
FEV1FEV6-PRED: 82 %
FEV1FVC-PRE: 45 %
FEV1FVC-PRED: 80 %
FEV1SVC-PRE: 42 %
FEV1SVC-PRED: 79 %
FIFMAX-PRE: 4.61 L/SEC
FVC-%PRED-PRE: 77 %
FVC-PRE: 2.47 L
FVC-PRED: 3.17 L
IC-%PRED-PRE: 78 %
IC-PRE: 2.13 L
IC-PRED: 2.71 L
VA-%PRED-PRE: 94 %
VA-PRE: 4.31 L
VC-%PRED-PRE: 82 %
VC-PRE: 2.64 L
VC-PRED: 3.21 L

## 2019-02-25 ENCOUNTER — TELEPHONE (OUTPATIENT)
Dept: PEDIATRICS | Facility: CLINIC | Age: 54
End: 2019-02-25

## 2019-02-25 NOTE — TELEPHONE ENCOUNTER
Received phone call from chris Garland at [facility] who states that when they run Rx through patients [employer] insurance they still receive a kick back that a PA is needed.     Pharmacy has a current Rx so no need for new prescription per pharmacist.     Attempted to transfer pharmacy to PA department for assistance, but PA staff indicated they were unable to assist.    Advised chris Garland will call insurance to assist.  Emory RIOJAS, CMA

## 2019-02-25 NOTE — TELEPHONE ENCOUNTER
Routing encounter as a high priority to Prior Authorization department to initiate PA for fentaNYL (DURAGESIC) 100 mcg/hr 72 hr patch.  Sola Madrigal, CMA

## 2019-02-25 NOTE — TELEPHONE ENCOUNTER
Prior Authorization Approval    Authorization Effective Date: 1/26/2019  Authorization Expiration Date: 2/25/2020  Medication: fentaNYL (DURAGESIC) 100 mcg/hr 72 hr patch-APPROVED  Approved Dose/Quantity:   Reference #: [id number]   Insurance Company: [employer] - Phone [phone] Fax [phone]  Expected CoPay:       CoPay Card Available:      Foundation Assistance Needed:    Which Pharmacy is filling the prescription (Not needed for infusion/clinic administered): [facility] [id number] [place] [address]  Pharmacy Notified: Yes  Patient Notified: No-Pharmacy will notify patient when ready.

## 2019-02-25 NOTE — TELEPHONE ENCOUNTER
Patient's Rx for fentanyl patches were written for every 48 hours.    Routing to provider to please advise on starting a JESSICA Quiroga RN, [facility]

## 2019-02-25 NOTE — TELEPHONE ENCOUNTER
"Called insurance who states PA is approved and entered in system. Insurance states that if pharmacy is still getting rejection when running prescription they need to call the pharmacy help desk and indicate they are calling regarding a \"DUR rejection\". Called Steve. Gill gone for the day. Advised the onsite pharmacist of above info.  Emory RIOJAS CMA    "

## 2019-02-25 NOTE — TELEPHONE ENCOUNTER
Routing Prior Authorization approval to PCP as an FYI. Pharmacy has been notified and will contact patient when prescription is available for .  Sola Madrigal CMA

## 2019-02-25 NOTE — TELEPHONE ENCOUNTER
Health Call Center    Phone Message    May a detailed message be left on voicemail: yes    Reason for Call: Medication Question or concern regarding medication   Prescription Clarification  Name of Medication: fentaNYL (DURAGESIC) 100 mcg/hr 72 hr patch [92718] (Order [id number])     Prescribing Provider: Dr. Topete    Pharmacy: [facility] [address]   What on the order needs clarification? Gill is calling to as if an Urgent PA can be started for patient. She states it is a quantity limit PA. Patient   is due a new patch today. Please advise [phone].           Action Taken: Message routed to:  Primary Care p [phone]

## 2019-02-26 NOTE — TELEPHONE ENCOUNTER
Add Letter when rec'd. Pharmacy notified.     Prior Authorization Approval (updated PA)    Authorization Effective Date: 1/26/2019  Authorization Expiration Date: 2/25/2020  Medication: fentaNYL (DURAGESIC) 100 mcg/hr 72 hr patch-APPROVED  Approved Dose/Quantity:   Reference #: [id number]   Insurance Company: [employer] - Phone [phone] Fax [phone]  Expected CoPay:       CoPay Card Available:      Foundation Assistance Needed:    Which Pharmacy is filling the prescription (Not needed for infusion/clinic administered): [facility] [address] [address]  Pharmacy Notified: Yes  Patient Notified: No-Pharmacy will notify patient when ready.

## 2019-02-26 NOTE — TELEPHONE ENCOUNTER
I have Luann is calling from [facility] pharmacy [facility] stating that the PA for patient's fentaNYL (DURAGESIC) 100 mcg/hr 72 hr patch [[id number]] (Order [id number]) States the MME in the PA was submitted for 400. It does need to be resubmitted for another PA with the MME being listed at either 420 or 425. Insurance is including the lidocaine in that as well. Please call Luann to follow up [phone].

## 2019-02-27 NOTE — PROGRESS NOTES
Reason for Visit  Jaimee Rodriguez is a 53 year old year old female who is being seen for RECHECK    ILD HPI    Jaimee Rodrigeuz is a 53-year-old female who follows up today for COPD.  She follows up for an annual visit today.   She continues to smoke About 1/2-1 pack of cigarettes per day.  She returns clinic today overall stating there has not been much change in her breathing.  She does report significant sleep disturbances though she claims that she has difficulty falling asleep at night and then she will be tired during the day.  She continues to use her inhalers as prescribed.  Using her albuterol inhaler 1-2 times per day.  Otherwise no other new complaints today.      Current Outpatient Medications   Medication     albuterol (PROAIR HFA/PROVENTIL HFA/VENTOLIN HFA) 108 (90 Base) MCG/ACT Inhaler     azithromycin (ZITHROMAX) 250 MG tablet     fentaNYL (DURAGESIC) 100 mcg/hr 72 hr patch     [START ON 3/24/2019] fentaNYL (DURAGESIC) 100 mcg/hr 72 hr patch     [START ON 4/23/2019] fentaNYL (DURAGESIC) 100 mcg/hr 72 hr patch     guaiFENesin (MUCINEX) 600 MG 12 hr tablet     HYDROcodone-acetaminophen (NORCO)  MG per tablet     [START ON 3/24/2019] HYDROcodone-acetaminophen (NORCO)  MG per tablet     [START ON 4/23/2019] HYDROcodone-acetaminophen (NORCO)  MG per tablet     lisinopril-hydrochlorothiazide (PRINZIDE/ZESTORETIC) 10-12.5 MG tablet     methocarbamol (ROBAXIN) 500 MG tablet     mometasone-formoterol (DULERA) 200-5 MCG/ACT inhaler     order for DME     ORDER FOR DME     polyethylene glycol (MIRALAX/GLYCOLAX) powder     tiotropium (SPIRIVA RESPIMAT) 2.5 MCG/ACT inhalation aerosol     tiZANidine (ZANAFLEX) 4 MG tablet     traZODone (DESYREL) 100 MG tablet     venlafaxine (EFFEXOR-XR) 150 MG 24 hr capsule     zolpidem (AMBIEN CR) 6.25 MG CR tablet     No current facility-administered medications for this visit.      Allergies   Allergen Reactions     Morphine      Past Medical History:   Diagnosis  Date     Cervical spondylosis without myelopathy     With radiculopathy.     Contact dermatitis and other eczema, due to unspecified cause     eczema     Endometriosis, site unspecified     R uterosacral ligament/surgically removed     Irregular menstrual cycle      NONSPECIFIC MEDICAL HISTORY     Mood swings, Irritability     Other acne      Other and unspecified ovarian cyst     sydni R     Tobacco use disorder 2001       Past Surgical History:   Procedure Laterality Date     C APPENDECTOMY       C  DELIVERY ONLY  ,,    , Low Cervical     C LIGATE FALLOPIAN TUBE      S/P tubal ligation     C TOTAL ABDOM HYSTERECTOMY      not total     pt did not have ovaries removed     HC ENLARGE BREAST WITH IMPLANT       SURGICAL HISTORY OF -   10/18/2005    C6 corpectomy with iliac crest autograft fixation. [facility]       Social History     Socioeconomic History     Marital status:      Spouse name: Not on file     Number of children: Not on file     Years of education: Not on file     Highest education level: Not on file   Occupational History     Not on file   Social Needs     Financial resource strain: Not on file     Food insecurity:     Worry: Not on file     Inability: Not on file     Transportation needs:     Medical: Not on file     Non-medical: Not on file   Tobacco Use     Smoking status: Current Every Day Smoker     Packs/day: 0.75     Years: 35.00     Pack years: 26.25     Types: Cigarettes     Smokeless tobacco: Never Used     Tobacco comment: started age 14.   Substance and Sexual Activity     Alcohol use: Yes     Alcohol/week: 0.0 oz     Comment: occ     Drug use: No     Sexual activity: Yes     Partners: Male     Birth control/protection: Surgical   Lifestyle     Physical activity:     Days per week: Not on file     Minutes per session: Not on file     Stress: Not on file   Relationships     Social connections:     Talks on phone: Not on file      "Gets together: Not on file     Attends Sabianism service: Not on file     Active member of club or organization: Not on file     Attends meetings of clubs or organizations: Not on file     Relationship status: Not on file     Intimate partner violence:     Fear of current or ex partner: Not on file     Emotionally abused: Not on file     Physically abused: Not on file     Forced sexual activity: Not on file   Other Topics Concern      Service No     Blood Transfusions No     Caffeine Concern No     Occupational Exposure No     Hobby Hazards No     Sleep Concern Yes     Stress Concern No     Weight Concern No     Special Diet No     Comment: not a milk drinker     Back Care No     Exercise No     Comment: walks at work     Bike Helmet Not Asked     Seat Belt Yes     Self-Exams No     Parent/sibling w/ CABG, MI or angioplasty before 65F 55M? Not Asked   Social History Narrative     Not on file       Family History   Problem Relation Age of Onset     Cerebrovascular Disease Mother      Chronic Obstructive Pulmonary Disease Mother      Arthritis Father      Diabetes Maternal Grandmother      Cancer Maternal Aunt         breast       ROS Pulmonary    A complete ROS was otherwise negative except as noted in the HPI.  Vitals:    02/21/19 1357   BP: 128/63   BP Location: Right leg   Patient Position: Sitting   Cuff Size: Adult Regular   Pulse: 80   Temp: 97.8  F (36.6  C)   TempSrc: Oral   SpO2: 93%   Weight: 86.5 kg (190 lb 12.8 oz)   Height: 1.575 m (5' 2\")     Exam:   GENERAL APPEARANCE: Well developed, well nourished, alert, and in no apparent distress.  NECK: supple, no masses, no thyromegaly.  LYMPHATICS: No significant axillary, cervical, or supraclavicular nodes.  RESP: mildly decreased BS, no wheezes  CV: Normal S1, S2, regular rhythm, normal rate, no rub, no murmur,  no gallop, no LE edema.   ABDOMEN:  Bowel sounds normal, soft, nontender, no HSM or masses.   MS: extremities normal- no clubbing, no " cyanosis.  PSYCH: mentation appears normal. and affect normal/bright  Results: I have reviewed all imaging, PFTs and other relavent tests, please see below for details, PFT and imaging results were reviewed with the patient.  PFTs: Moderate to severe obstruction.  Stable from previous.    Assessment and plan:    53-year-old female with stable COPD ongoing tobacco abuse.  We again discussed smoking cessation and it is something that she is working on.  We discussed various methods of nicotine replacement.  She has not been able to tolerate other medications in the past.  I will also make a referral to sleep clinic to help with her sleeping issues.  Otherwise I will see her back in 1 year with point function test she can call sooner with any changes in her symptoms    CBC   Recent Labs   Lab Test 07/05/18  1733 06/15/18  0931   RBC 4.79 4.53   HGB 14.1 13.3   HCT 43.8 41.4    294       Basic Metabolic Panel  Recent Labs   Lab Test 07/05/18  1733 06/15/18  0931    140   POTASSIUM 4.4 4.0   CHLORIDE 104 102   CO2 32 32   BUN 22 28   * 87   JOSEPH 9.5 9.3       INR  No results for input(s): INR in the last 46160 hours.    PFT  PFT Latest Ref Rng & Units 2/21/2019   FVC L 2.47   FEV1 L 1.10   FVC% % 77   FEV1% % 43           CC:

## 2019-03-13 DIAGNOSIS — J43.9 PULMONARY EMPHYSEMA, UNSPECIFIED EMPHYSEMA TYPE (H): ICD-10-CM

## 2019-03-13 NOTE — TELEPHONE ENCOUNTER
SPIRIVA RESPIMAT 2.5MCG INH 4GM 60D  Last Written Prescription Date:  02/22/2018  Last Fill Quantity: 12 g ,  # refills: 11  Last office visit: 2/21/2019 with prescribing provider:    Last refill per Pharmacy  Future Office Visit:      Sky Briggs CMA ([facility])

## 2019-03-20 ENCOUNTER — MYC MEDICAL ADVICE (OUTPATIENT)
Dept: PEDIATRICS | Facility: CLINIC | Age: 54
End: 2019-03-20

## 2019-03-20 DIAGNOSIS — G89.29 CHRONIC NECK PAIN: Primary | ICD-10-CM

## 2019-03-20 DIAGNOSIS — M54.2 CHRONIC NECK PAIN: Primary | ICD-10-CM

## 2019-03-21 NOTE — TELEPHONE ENCOUNTER
Physical Therapy referral faxed to Nicole at fax #: [phone]. Received confirmation from [employer] that fax was sent successfully.    Sent patient MyChart reply stating referral has been faxed as requested.  Sola Madrigal CMA

## 2019-04-02 ENCOUNTER — TRANSFERRED RECORDS (OUTPATIENT)
Dept: HEALTH INFORMATION MANAGEMENT | Facility: CLINIC | Age: 54
End: 2019-04-02

## 2019-04-08 ENCOUNTER — OFFICE VISIT (OUTPATIENT)
Dept: PEDIATRICS | Facility: CLINIC | Age: 54
End: 2019-04-08
Payer: COMMERCIAL

## 2019-04-08 ENCOUNTER — ANCILLARY PROCEDURE (OUTPATIENT)
Dept: GENERAL RADIOLOGY | Facility: CLINIC | Age: 54
End: 2019-04-08
Attending: FAMILY MEDICINE
Payer: COMMERCIAL

## 2019-04-08 VITALS
HEART RATE: 97 BPM | WEIGHT: 191 LBS | DIASTOLIC BLOOD PRESSURE: 67 MMHG | OXYGEN SATURATION: 94 % | BODY MASS INDEX: 34.93 KG/M2 | SYSTOLIC BLOOD PRESSURE: 91 MMHG | TEMPERATURE: 99.1 F

## 2019-04-08 DIAGNOSIS — J20.9 ACUTE BRONCHITIS, UNSPECIFIED ORGANISM: ICD-10-CM

## 2019-04-08 DIAGNOSIS — J44.1 COPD EXACERBATION (H): Primary | ICD-10-CM

## 2019-04-08 DIAGNOSIS — J44.1 COPD EXACERBATION (H): ICD-10-CM

## 2019-04-08 DIAGNOSIS — F17.200 TOBACCO DEPENDENCE SYNDROME: ICD-10-CM

## 2019-04-08 DIAGNOSIS — J10.1 INFLUENZA A: ICD-10-CM

## 2019-04-08 LAB
ALBUMIN SERPL-MCNC: 3.7 G/DL (ref 3.4–5)
ALP SERPL-CCNC: 58 U/L (ref 40–150)
ALT SERPL W P-5'-P-CCNC: 40 U/L (ref 0–50)
ANION GAP SERPL CALCULATED.3IONS-SCNC: 6 MMOL/L (ref 3–14)
AST SERPL W P-5'-P-CCNC: 25 U/L (ref 0–45)
BILIRUB SERPL-MCNC: 0.3 MG/DL (ref 0.2–1.3)
BUN SERPL-MCNC: 27 MG/DL (ref 7–30)
CALCIUM SERPL-MCNC: 8.8 MG/DL (ref 8.5–10.1)
CHLORIDE SERPL-SCNC: 99 MMOL/L (ref 94–109)
CO2 SERPL-SCNC: 30 MMOL/L (ref 20–32)
CREAT SERPL-MCNC: 1.13 MG/DL (ref 0.52–1.04)
ERYTHROCYTE [DISTWIDTH] IN BLOOD BY AUTOMATED COUNT: 14 % (ref 10–15)
FLUAV+FLUBV AG SPEC QL: NEGATIVE
FLUAV+FLUBV AG SPEC QL: POSITIVE
GFR SERPL CREATININE-BSD FRML MDRD: 55 ML/MIN/{1.73_M2}
GLUCOSE SERPL-MCNC: 100 MG/DL (ref 70–99)
HCT VFR BLD AUTO: 33.7 % (ref 35–47)
HGB BLD-MCNC: 11.2 G/DL (ref 11.7–15.7)
MCH RBC QN AUTO: 29.6 PG (ref 26.5–33)
MCHC RBC AUTO-ENTMCNC: 33.2 G/DL (ref 31.5–36.5)
MCV RBC AUTO: 89 FL (ref 78–100)
PLATELET # BLD AUTO: 223 10E9/L (ref 150–450)
POTASSIUM SERPL-SCNC: 3.8 MMOL/L (ref 3.4–5.3)
PROT SERPL-MCNC: 7.1 G/DL (ref 6.8–8.8)
RBC # BLD AUTO: 3.79 10E12/L (ref 3.8–5.2)
SODIUM SERPL-SCNC: 135 MMOL/L (ref 133–144)
SPECIMEN SOURCE: ABNORMAL
WBC # BLD AUTO: 7.4 10E9/L (ref 4–11)

## 2019-04-08 PROCEDURE — 96372 THER/PROPH/DIAG INJ SC/IM: CPT | Performed by: FAMILY MEDICINE

## 2019-04-08 PROCEDURE — 36415 COLL VENOUS BLD VENIPUNCTURE: CPT | Performed by: FAMILY MEDICINE

## 2019-04-08 PROCEDURE — 87804 INFLUENZA ASSAY W/OPTIC: CPT | Performed by: FAMILY MEDICINE

## 2019-04-08 PROCEDURE — 80053 COMPREHEN METABOLIC PANEL: CPT | Performed by: FAMILY MEDICINE

## 2019-04-08 PROCEDURE — 85027 COMPLETE CBC AUTOMATED: CPT | Performed by: FAMILY MEDICINE

## 2019-04-08 PROCEDURE — 71046 X-RAY EXAM CHEST 2 VIEWS: CPT | Performed by: RADIOLOGY

## 2019-04-08 PROCEDURE — 94640 AIRWAY INHALATION TREATMENT: CPT | Performed by: FAMILY MEDICINE

## 2019-04-08 PROCEDURE — 99214 OFFICE O/P EST MOD 30 MIN: CPT | Mod: 25 | Performed by: FAMILY MEDICINE

## 2019-04-08 RX ORDER — GUAIFENESIN 600 MG/1
600 TABLET, EXTENDED RELEASE ORAL 2 TIMES DAILY
Qty: 10 TABLET | Refills: 0 | Status: SHIPPED | OUTPATIENT
Start: 2019-04-08 | End: 2019-04-12

## 2019-04-08 RX ORDER — ALBUTEROL SULFATE 0.83 MG/ML
2.5 SOLUTION RESPIRATORY (INHALATION) EVERY 6 HOURS PRN
Qty: 1 BOX | Refills: 0 | Status: SHIPPED | OUTPATIENT
Start: 2019-04-08 | End: 2019-04-08

## 2019-04-08 RX ORDER — AZITHROMYCIN 250 MG/1
TABLET, FILM COATED ORAL
Qty: 6 TABLET | Refills: 0 | Status: SHIPPED | OUTPATIENT
Start: 2019-04-08 | End: 2019-04-19

## 2019-04-08 RX ORDER — BENZONATATE 100 MG/1
100 CAPSULE ORAL 3 TIMES DAILY PRN
Qty: 42 CAPSULE | Refills: 0 | Status: SHIPPED | OUTPATIENT
Start: 2019-04-08 | End: 2019-04-19

## 2019-04-08 RX ORDER — ALBUTEROL SULFATE 0.83 MG/ML
2.5 SOLUTION RESPIRATORY (INHALATION) EVERY 4 HOURS
Qty: 1 BOX | Refills: 0 | Status: SHIPPED | OUTPATIENT
Start: 2019-04-08 | End: 2019-06-20

## 2019-04-08 RX ORDER — METHYLPREDNISOLONE SOD SUCC 125 MG
125 VIAL (EA) INJECTION ONCE
Status: COMPLETED | OUTPATIENT
Start: 2019-04-08 | End: 2019-04-08

## 2019-04-08 RX ORDER — PREDNISONE 20 MG/1
TABLET ORAL
Qty: 45 TABLET | Refills: 0 | Status: SHIPPED | OUTPATIENT
Start: 2019-04-08 | End: 2019-06-20 | Stop reason: SINTOL

## 2019-04-08 RX ORDER — ALBUTEROL SULFATE 0.83 MG/ML
2.5 SOLUTION RESPIRATORY (INHALATION) ONCE
Status: COMPLETED | OUTPATIENT
Start: 2019-04-08 | End: 2019-04-08

## 2019-04-08 RX ORDER — OSELTAMIVIR PHOSPHATE 75 MG/1
75 CAPSULE ORAL 2 TIMES DAILY
Qty: 10 CAPSULE | Refills: 0 | Status: SHIPPED | OUTPATIENT
Start: 2019-04-08 | End: 2019-04-19

## 2019-04-08 RX ADMIN — ALBUTEROL SULFATE 2.5 MG: 0.83 SOLUTION RESPIRATORY (INHALATION) at 10:22

## 2019-04-08 RX ADMIN — Medication 125 MG: at 10:22

## 2019-04-08 NOTE — PROGRESS NOTES
SUBJECTIVE:                                                    Jaimee Rodriguez is a 53 year old female who presents to clinic today for the following health issues:      RESPIRATORY SYMPTOMS      Duration: 3 days    Description  cough, wheezing, fever, chills, headache, fatigue/malaise and myalgias    Severity: severe    Accompanying signs and symptoms: COPD Flare up    History (predisposing factors):  COPD    Precipitating or alleviating factors: None    Therapies tried and outcome:  OTC NSAID      COPD Follow-Up    Symptoms are currently: slightly worsened    Current fatigue or dyspnea with ambulation: worsened from baseline    Shortness of breath: slightly worsened    Increased or change in Cough/Sputum: No    Fever(s): Yes-  Last elevated temp of 100 was yesterday afternoon    She is on 2-4 liters of oxygen depending on her exertion. She is currently on just 2 liters at rest with sats at 94%    Any ER/UC or hospital admissions since your last visit? No     History   Smoking Status     Current Every Day Smoker     Packs/day: 0.75     Years: 35.00     Types: Cigarettes   Smokeless Tobacco     Never Used     Comment: started age 14.       Problem list and histories reviewed & adjusted, as indicated.  Additional history: as documented    Patient Active Problem List   Diagnosis     Other acne     Chronic neck pain     Obesity     Major depression in partial remission     Fusion of spine of cervical region     CARDIOVASCULAR SCREENING; LDL GOAL LESS THAN 160     Depression with anxiety     Emphysema lung (H)     COPD, severe (H)     Former smoker     Chronic insomnia     Hypoxia     O2 dependent     Tobacco use disorder     Past Surgical History:   Procedure Laterality Date     C APPENDECTOMY       C  DELIVERY ONLY  ,,    , Low Cervical     C LIGATE FALLOPIAN TUBE      S/P tubal ligation     C TOTAL ABDOM HYSTERECTOMY      not total     pt did not have ovaries removed     HC  ENLARGE BREAST WITH IMPLANT  1998     SURGICAL HISTORY OF -   10/18/2005    C6 corpectomy with iliac crest autograft fixation. [facility]       Social History     Tobacco Use     Smoking status: Current Every Day Smoker     Packs/day: 0.75     Years: 35.00     Pack years: 26.25     Types: Cigarettes     Smokeless tobacco: Never Used     Tobacco comment: started age 14.   Substance Use Topics     Alcohol use: Yes     Alcohol/week: 0.0 oz     Comment: occ     Family History   Problem Relation Age of Onset     Cerebrovascular Disease Mother      Chronic Obstructive Pulmonary Disease Mother      Arthritis Father      Diabetes Maternal Grandmother      Cancer Maternal Aunt         breast         Current Outpatient Medications   Medication Sig Dispense Refill     albuterol (PROAIR HFA/PROVENTIL HFA/VENTOLIN HFA) 108 (90 Base) MCG/ACT Inhaler Inhale 2 puffs into the lungs every 6 hours as needed for shortness of breath / dyspnea or wheezing 1 Inhaler 11     albuterol (PROVENTIL) (2.5 MG/3ML) 0.083% neb solution Take 1 vial (2.5 mg) by nebulization every 4 hours 1 Box 0     azithromycin (ZITHROMAX) 250 MG tablet Take 2 tablets (500 mg) by mouth daily for 1 day, THEN 1 tablet (250 mg) daily for 4 days. 6 tablet 0     benzonatate (TESSALON) 100 MG capsule Take 1 capsule (100 mg) by mouth 3 times daily as needed for cough 42 capsule 0     fentaNYL (DURAGESIC) 100 mcg/hr 72 hr patch Place 1 patch onto the skin every 48 hours (Mylan brand) 15 patch 0     guaiFENesin (MUCINEX) 600 MG 12 hr tablet Take 1 tablet (600 mg) by mouth 2 times daily for 5 days 10 tablet 0     HYDROcodone-acetaminophen (NORCO)  MG per tablet Take 1 tablet by mouth every 4 hours as needed for pain (For 1 time increase only) 180 tablet 0     lisinopril-hydrochlorothiazide (PRINZIDE/ZESTORETIC) 10-12.5 MG tablet Take 1 tablet by mouth daily 90 tablet 3     mometasone-formoterol (DULERA) 200-5 MCG/ACT inhaler Inhale 2 puffs into the lungs 2 times  daily 1 Inhaler 11     order for DME Equipment being ordered: Nebulizer 1 Application 0     oseltamivir (TAMIFLU) 75 MG capsule Take 1 capsule (75 mg) by mouth 2 times daily for 5 days 10 capsule 0     predniSONE (DELTASONE) 20 MG tablet 60 MG FOR 3 DAYS, 50 MG FOR  3 DAYS, 40 MG FOR  3 DAYS, 30 MG 2 DAYS, 20 MG 2 DAYS, 10 MG 1 DAY, 5 MG 1 DAY. 45 tablet 0     tiotropium (SPIRIVA RESPIMAT) 2.5 MCG/ACT inhaler Inhale 2 puffs into the lungs daily 12 g 11     tiZANidine (ZANAFLEX) 4 MG tablet Take 1 tablet (4 mg) by mouth 3 times daily 90 tablet 3     traZODone (DESYREL) 100 MG tablet Take 1.5-2 tablets (150-200 mg) by mouth nightly as needed for sleep 180 tablet 3     fentaNYL (DURAGESIC) 100 mcg/hr 72 hr patch Place 1 patch onto the skin every 48 hours (Mylan brand) 15 patch 0     [START ON 4/23/2019] fentaNYL (DURAGESIC) 100 mcg/hr 72 hr patch Place 1 patch onto the skin every 48 hours (Mylan brand) 15 patch 0     HYDROcodone-acetaminophen (NORCO)  MG per tablet Take 1 tablet by mouth every 4 hours as needed for pain (For 1 time increase only) 180 tablet 0     [START ON 4/23/2019] HYDROcodone-acetaminophen (NORCO)  MG per tablet Take 1 tablet by mouth every 4 hours as needed for pain (For 1 time increase only) 180 tablet 0     methocarbamol (ROBAXIN) 500 MG tablet Take 1-2 tablets (500-1,000 mg) by mouth 4 times daily as needed for muscle spasms (Patient not taking: Reported on 4/8/2019) 90 tablet 1     order for DME Equipment being ordered: Superfeet - Blue - Size C (2 pairs please) 2 Device 0     ORDER FOR DME Equipment being ordered: Oxygen 4 lpm via nasal cannula continuous flow with any exertion and 2 lpm continuous flow with nasal cannula at rest and at night. Provide portability.  Length of need 99. 1 Device 1     polyethylene glycol (MIRALAX/GLYCOLAX) powder MIX 17 GRAMS(ONE CAPFUL) IN LIQUID AND DRINK ONCE DAILY AS NEEDED FOR CONSTIPATION (Patient not taking: Reported on 4/8/2019) 527 g 5      venlafaxine (EFFEXOR-XR) 150 MG 24 hr capsule Take 1 capsule (150 mg) by mouth daily (Patient not taking: Reported on 4/8/2019) 30 capsule 5       ROS:  Constitutional, HEENT, cardiovascular, pulmonary, gi and gu systems are negative, except as otherwise noted.    OBJECTIVE:     BP 91/67   Pulse 97   Temp 99.1  F (37.3  C) (Temporal)   Wt 86.6 kg (191 lb)   SpO2 94%   BMI 34.93 kg/m    Body mass index is 34.93 kg/m .   GENERAL: Mild respiratory distress with audible wheezing.  EYES: Eyes grossly normal to inspection, PERRL and conjunctivae and sclerae normal  HENT: ear canals and TM's normal, nose and mouth without ulcers or lesions  NECK: no adenopathy, no asymmetry, masses, or scars and thyroid normal to palpation  RESP: Dullness to ascultation  CV: regular rate and rhythm, normal S1 S2, no S3 or S4, no murmur, click or rub, no peripheral edema and peripheral pulses strong  ABDOMEN: soft, nontender, no hepatosplenomegaly, no masses and bowel sounds normal    Results for orders placed or performed in visit on 04/08/19   CBC with platelets   Result Value Ref Range    WBC 7.4 4.0 - 11.0 10e9/L    RBC Count 3.79 (L) 3.8 - 5.2 10e12/L    Hemoglobin 11.2 (L) 11.7 - 15.7 g/dL    Hematocrit 33.7 (L) 35.0 - 47.0 %    MCV 89 78 - 100 fl    MCH 29.6 26.5 - 33.0 pg    MCHC 33.2 31.5 - 36.5 g/dL    RDW 14.0 10.0 - 15.0 %    Platelet Count 223 150 - 450 10e9/L   Comprehensive metabolic panel   Result Value Ref Range    Sodium 135 133 - 144 mmol/L    Potassium 3.8 3.4 - 5.3 mmol/L    Chloride 99 94 - 109 mmol/L    Carbon Dioxide 30 20 - 32 mmol/L    Anion Gap 6 3 - 14 mmol/L    Glucose 100 (H) 70 - 99 mg/dL    Urea Nitrogen 27 7 - 30 mg/dL    Creatinine 1.13 (H) 0.52 - 1.04 mg/dL    GFR Estimate 55 (L) >60 mL/min/[1.73_m2]    GFR Estimate If Black 63 >60 mL/min/[1.73_m2]    Calcium 8.8 8.5 - 10.1 mg/dL    Bilirubin Total 0.3 0.2 - 1.3 mg/dL    Albumin 3.7 3.4 - 5.0 g/dL    Protein Total 7.1 6.8 - 8.8 g/dL    Alkaline  Phosphatase 58 40 - 150 U/L    ALT 40 0 - 50 U/L    AST 25 0 - 45 U/L   Influenza A/B antigen   Result Value Ref Range    Influenza A/B Agn Specimen Nasal     Influenza A Positive (A) NEG^Negative    Influenza B Negative NEG^Negative         ASSESSMENT:   Jaimee was seen today for uri.    Diagnoses and all orders for this visit:    COPD exacerbation (H)  -     CBC with platelets  -     Comprehensive metabolic panel  -     XR Chest 2 Views; Future  -     Influenza A/B antigen  -     Sputum Culture Aerobic Bacterial; Future  -     albuterol (PROVENTIL) neb solution 2.5 mg  -     methylPREDNISolone sodium succinate (solu-MEDROL) injection 125 mg  -     order for DME; Equipment being ordered: Nebulizer  -     Discontinue: albuterol (PROVENTIL) (2.5 MG/3ML) 0.083% neb solution; Take 1 vial (2.5 mg) by nebulization every 6 hours as needed for shortness of breath / dyspnea or wheezing  -     predniSONE (DELTASONE) 20 MG tablet; 60 MG FOR 3 DAYS, 50 MG FOR  3 DAYS, 40 MG FOR  3 DAYS, 30 MG 2 DAYS, 20 MG 2 DAYS, 10 MG 1 DAY, 5 MG 1 DAY.  -     benzonatate (TESSALON) 100 MG capsule; Take 1 capsule (100 mg) by mouth 3 times daily as needed for cough  -     guaiFENesin (MUCINEX) 600 MG 12 hr tablet; Take 1 tablet (600 mg) by mouth 2 times daily for 5 days  -     azithromycin (ZITHROMAX) 250 MG tablet; Take 2 tablets (500 mg) by mouth daily for 1 day, THEN 1 tablet (250 mg) daily for 4 days.  -     albuterol (PROVENTIL) (2.5 MG/3ML) 0.083% neb solution; Take 1 vial (2.5 mg) by nebulization every 4 hours    Acute bronchitis, unspecified organism  -     order for DME; Equipment being ordered: Nebulizer  -     Discontinue: albuterol (PROVENTIL) (2.5 MG/3ML) 0.083% neb solution; Take 1 vial (2.5 mg) by nebulization every 6 hours as needed for shortness of breath / dyspnea or wheezing  -     predniSONE (DELTASONE) 20 MG tablet; 60 MG FOR 3 DAYS, 50 MG FOR  3 DAYS, 40 MG FOR  3 DAYS, 30 MG 2 DAYS, 20 MG 2 DAYS, 10 MG 1 DAY, 5 MG 1  DAY.  -     benzonatate (TESSALON) 100 MG capsule; Take 1 capsule (100 mg) by mouth 3 times daily as needed for cough  -     guaiFENesin (MUCINEX) 600 MG 12 hr tablet; Take 1 tablet (600 mg) by mouth 2 times daily for 5 days  -     azithromycin (ZITHROMAX) 250 MG tablet; Take 2 tablets (500 mg) by mouth daily for 1 day, THEN 1 tablet (250 mg) daily for 4 days.  -     albuterol (PROVENTIL) (2.5 MG/3ML) 0.083% neb solution; Take 1 vial (2.5 mg) by nebulization every 4 hours    Tobacco dependence syndrome    Influenza A  -     oseltamivir (TAMIFLU) 75 MG capsule; Take 1 capsule (75 mg) by mouth 2 times daily for 5 days            PLAN:   -Orders as above, she received an albuterol nebulizer with 125 mg of solumedrol which helped tremendously with her symptoms.  -Symptomatic therapy suggested: push fluids, rest, use vaporizer or mist needed , use acetaminophen, ibuprofen as needed and encouraged very strongly to quit smoking. Close follow up with me in 2 days or sooner if concerns or go to the ED if symptoms worsen or persist.  -Discussed general respiratory tract infection care including importance of hydration, rest, over the counter therapies and techniques to prevent future infection as well as transmission to others.  Discussed signs or symptoms that would indicate need for recheck.   Treated with Tamiflu (adults) 75mg twice a day for 5 days  Discussed risks, benefits, side effects, and alternatives of therapy.    Continue spiriva and oxygen protocol but hold pro air HFA and dulera for now. Patient counseling strongly to quit smoking    Do scheduled albuterol nebs every 4 hours scheduled and every 2 hour as needed if short of breath, wheezing or increased cough.      See Patient Instructions    Rodriguez Boss MD  [facility]

## 2019-04-08 NOTE — PATIENT INSTRUCTIONS
Patient Education     How to Quit Smoking  Smoking is one of the hardest habits to break. About half of all people who have ever smoked have been able to quit. Most people who still smoke want to quit. Here are some of the best ways to stop smoking.    Keep trying  Most smokers make many attempts at quitting before they are successful. It s important not to give up.  Go cold turkey  Most former smokers quit cold turkey (all at once). Trying to cut back gradually doesn't seem to work as well, perhaps because it continues the smoking habit. Also, it is possible to inhale more while smoking fewer cigarettes. This results in the same amount of nicotine in your body.  Get support  Support programs can be a big help, especially for heavy smokers. These groups offer lectures, ways to change behavior, and peer support. Here are some ways to find a support program:    Free national quitline: 800-QUIT-NOW (106-134-1834).    [facility] quit-smoking programs.    American Lung Association: (350.566.5193).    American Cancer Society (192-163-3726).  Support at home is important too. Nonsmokers can offer praise and encouragement. If the smoker in your life finds it hard to quit, encourage them to keep trying.  Over-the-counter medicines  Nicotine replacement therapy may make quitting easier. Certain aids, such as the nicotine patch, gum, and lozenges, are available without a prescription. It is best to use these under a doctor s care, though. The skin patch provides a steady supply of nicotine. Nicotine gum and lozenges give temporary bursts of low levels of nicotine. Both methods reduce the craving for cigarettes. Warning: If you have nausea, vomiting, dizziness, weakness, or a fast heartbeat, stop using these products and see your doctor.  Prescription medicines  After reviewing your smoking patterns and past attempts to quit, your doctor may offer a prescription medicine such as bupropion, varenicline, a nicotine inhaler, or  "nasal spray. Each has advantages and side effects. Your doctor can review these with you.  Health benefits of quitting  The benefits of quitting start right away and keep improving the longer you go without smoking. These benefits occur at any age.  So whether you are 17 or 70, quitting is a good decision. Some of the benefits include:    20 minutes: Blood pressure and pulse return to normal.    8 hours: Oxygen levels return to normal.    2 days: Ability to smell and taste begin to improve as damaged nerves regrow.    2 to 3 weeks: Circulation and lung function improve.    1 to 9 months: Coughing, congestion, and shortness of breath decrease; tiredness decreases.    1 year: Risk of heart attack decreases by half.    5 years: Risk of lung cancer decreases by half; risk of stroke becomes the same as a nonsmoker s.  For more on how to quit smoking, try these online resources:     Smokefree.gov    \"Clearing the Air\" booklet from the National Cancer [place]: smokefree.gov/sites/default/files/pdf/clearing-the-air-accessible.pdf  Date Last Reviewed: 3/1/2017    6759-1760 [employer]. [address] [place] [address]. All rights reserved. This information is not intended as a substitute for professional medical care. Always follow your healthcare professional's instructions.           "

## 2019-04-08 NOTE — NURSING NOTE
Prior to injection, verified patient identity using patient's name and date of birth.  Due to injection administration, patient instructed to remain in clinic for 15 minutes  afterwards, and to report any adverse reaction to me immediately.    Albuterol neb    Drug Amount Wasted:  None.  Vial/Syringe: Single dose vial  Expiration Date:     Lot [id number]  Ilene Small [facility]    Prior to injection, verified patient identity using patient's name and date of birth.  Due to injection administration, patient instructed to remain in clinic for 15 minutes  afterwards, and to report any adverse reaction to me immediately.    solu medrol    Drug Amount Wasted:  None.  Vial/Syringe: Single dose vial  Ex[facility] Date:  9-2020  [facility]

## 2019-04-12 DIAGNOSIS — J20.9 ACUTE BRONCHITIS, UNSPECIFIED ORGANISM: ICD-10-CM

## 2019-04-12 DIAGNOSIS — J44.1 COPD EXACERBATION (H): ICD-10-CM

## 2019-04-12 RX ORDER — GUAIFENESIN 600 MG/1
600 TABLET, EXTENDED RELEASE ORAL 2 TIMES DAILY
Qty: 20 TABLET | Refills: 0 | Status: SHIPPED | OUTPATIENT
Start: 2019-04-12 | End: 2020-02-20

## 2019-04-12 NOTE — TELEPHONE ENCOUNTER
Based on the current taper, she is on 40 mg right now. She can go down to 20 mg for 3 days, the 10 mg for 3 days, then go off.

## 2019-04-12 NOTE — TELEPHONE ENCOUNTER
"Pt states that she is still on 50 mg daily, did not start the day the Rx was written because she received an injection in clinic.  Pt has not taken dose yet today.  She will take 20 mg starting today.  Will contact the clinic if worsening symptoms or no improvement in the emotional symptoms she has been experiencing.  Did discuss that it may interfere with sleep if taken in evening, pt states that she \"sleeps all different hours, so it won't matter\".  Mali Ramon RN   "

## 2019-04-12 NOTE — TELEPHONE ENCOUNTER
Seen on 4/8 for flu, bronchitis and COPD exacerbation.  Breathing is better, denies wheezing, SOB and less weight on her chest. Fever also resolved.   Coughing up yellow/dark green and she is still tired.    Had 4 panic attacks with neb and she used inhaler and hasn't had a panic attack since.   Prednisone makes her sr- can she decrease her Prednisone dose or taper down?  She also wants a refill of Mucinex.     Yesenia Corral RN

## 2019-04-12 NOTE — TELEPHONE ENCOUNTER
[facility] Call Center    Phone Message    May a detailed message be left on voicemail: yes    Reason for Call: Symptoms or Concerns     If patient has red-flag symptoms, warm transfer to triage line    Current symptom or concern: Panic attack when doing the nebulizer and makes her feel like she cant breath. predniSONE (DELTASONE) 20 MG tablet makes her feel angry and then start crying. Please advise.      Symptoms have been present for:  1 day(s)    Has patient previously been seen for this? No          Action Taken: Message routed to:  Primary Care p [phone]

## 2019-04-19 ENCOUNTER — OFFICE VISIT (OUTPATIENT)
Dept: SLEEP MEDICINE | Facility: CLINIC | Age: 54
End: 2019-04-19
Payer: COMMERCIAL

## 2019-04-19 VITALS
SYSTOLIC BLOOD PRESSURE: 146 MMHG | HEIGHT: 63 IN | BODY MASS INDEX: 34.02 KG/M2 | WEIGHT: 192 LBS | DIASTOLIC BLOOD PRESSURE: 88 MMHG | HEART RATE: 80 BPM | OXYGEN SATURATION: 89 %

## 2019-04-19 DIAGNOSIS — F51.04 CHRONIC INSOMNIA: Primary | ICD-10-CM

## 2019-04-19 DIAGNOSIS — R29.818 SUSPECTED SLEEP APNEA: ICD-10-CM

## 2019-04-19 PROCEDURE — 90791 PSYCH DIAGNOSTIC EVALUATION: CPT | Performed by: PSYCHOLOGIST

## 2019-04-19 ASSESSMENT — MIFFLIN-ST. JEOR: SCORE: 1440.04

## 2019-04-19 ASSESSMENT — ANXIETY QUESTIONNAIRES
5. BEING SO RESTLESS THAT IT IS HARD TO SIT STILL: SEVERAL DAYS
GAD7 TOTAL SCORE: 2
IF YOU CHECKED OFF ANY PROBLEMS ON THIS QUESTIONNAIRE, HOW DIFFICULT HAVE THESE PROBLEMS MADE IT FOR YOU TO DO YOUR WORK, TAKE CARE OF THINGS AT HOME, OR GET ALONG WITH OTHER PEOPLE: NOT DIFFICULT AT ALL
7. FEELING AFRAID AS IF SOMETHING AWFUL MIGHT HAPPEN: NOT AT ALL
1. FEELING NERVOUS, ANXIOUS, OR ON EDGE: NOT AT ALL
3. WORRYING TOO MUCH ABOUT DIFFERENT THINGS: NOT AT ALL
6. BECOMING EASILY ANNOYED OR IRRITABLE: NOT AT ALL
2. NOT BEING ABLE TO STOP OR CONTROL WORRYING: NOT AT ALL

## 2019-04-19 ASSESSMENT — PATIENT HEALTH QUESTIONNAIRE - PHQ9
5. POOR APPETITE OR OVEREATING: SEVERAL DAYS
SUM OF ALL RESPONSES TO PHQ QUESTIONS 1-9: 6

## 2019-04-19 NOTE — NURSING NOTE
"Chief Complaint   Patient presents with     Sleep Problem     Consult- always waking up- poor sleep.       Initial /88   Pulse 80   Ht 1.6 m (5' 3\")   Wt 87.1 kg (192 lb)   SpO2 (!) 89%   BMI 34.01 kg/m   Estimated body mass index is 34.01 kg/m  as calculated from the following:    Height as of this encounter: 1.6 m (5' 3\").    Weight as of this encounter: 87.1 kg (192 lb).    Medication Reconciliation: complete      "

## 2019-04-19 NOTE — PATIENT INSTRUCTIONS
We will schedule you to see a sleep physician as I am concerned that although you are not reporting snoring or witnessed apnea, your use of narcotic medication with hx of COPD increases risk for hypoventilation, obstructive and central sleep apnea.    Your sleep pattern is very fragmented across the 24 hour day.  You are a good candidate for sleep training.    Goal 1: Limit time in bed to 8.5 hours with bedtime no earlier than 10 PM and rise time with alarm of 6:30 AM.    Goal 2:  Avoid napping throughout the rest of day.  You indicate it is easier to stay awake if you get outside and spend time in kitchen instead of retreating to bed or recliner.    Goal 3:  Use only your  Bed for sleep, stop sleeping on couch.  Goal 4.  If you wake up in the middle of the night and can't return to sleep get out of bed and go to the kitchen and do something relaxing until you feel ready for sleep again.    Goal 4:  Avoid taking your trazodone in the middle of the night.  It likely is contributing to daytime sedation and sleepiness in late morning.  Take your trazodone as prescribed before bed.    Goal 5:  Limit caffeine use to morning and early afternoon.  We discussed that nicotine is a stimulant and can affect sleep but you indicate that you would not be able to avoid smoking in afternoon or when you wake up in the middle of the night.  Limit nicotine use as much as possible after dinner.    When you wake up you report your neck feels stiff, continue your habit of stretching your neck and doing exercises that help.  You should do this out of bed.  We

## 2019-04-20 ASSESSMENT — ANXIETY QUESTIONNAIRES: GAD7 TOTAL SCORE: 2

## 2019-04-23 ENCOUNTER — OFFICE VISIT (OUTPATIENT)
Dept: PEDIATRICS | Facility: CLINIC | Age: 54
End: 2019-04-23
Payer: COMMERCIAL

## 2019-04-23 VITALS
TEMPERATURE: 98.2 F | DIASTOLIC BLOOD PRESSURE: 71 MMHG | WEIGHT: 189 LBS | HEART RATE: 110 BPM | BODY MASS INDEX: 33.48 KG/M2 | OXYGEN SATURATION: 90 % | SYSTOLIC BLOOD PRESSURE: 124 MMHG

## 2019-04-23 DIAGNOSIS — E78.5 HYPERLIPIDEMIA LDL GOAL <130: ICD-10-CM

## 2019-04-23 DIAGNOSIS — I10 HYPERTENSION GOAL BP (BLOOD PRESSURE) < 140/90: ICD-10-CM

## 2019-04-23 DIAGNOSIS — M94.0 COSTOCHONDRITIS: Primary | ICD-10-CM

## 2019-04-23 PROCEDURE — 99213 OFFICE O/P EST LOW 20 MIN: CPT | Performed by: INTERNAL MEDICINE

## 2019-04-23 NOTE — PROGRESS NOTES
SUBJECTIVE:   Jaimee Rodriguez is a 53 year old female who presents to clinic today for the following   health issues:    Jaimee has Chronic neck pain; Major depression in partial remission; COPD, severe (H); Hypoxia; and O2 dependent on their pertinent problem list.    Patient was seen on 4/18/2018 for COPD exacerbation and bronchitis.  She had a checks x-ray at that time, did not show any pneumonia.  She was put on inhaler and prednisone as well as azithromycin.  Overall she is feeling better from a cough standpoint.  She does not cough as much as he used to.  However yesterday she developed her pain on the left chest above the breast.  It hurts to move certain ways and hurts to take a deep breath.  It hurts when she coughs.  She has to hold onto her chest and press on it in order to cough.    She has a home oximeter that checks her home oxygen level.  This has actually improved.  She is back at her baseline of 93-94% at home.        ROS:  Constitutional, HEENT, cardiovascular, pulmonary, gi and gu systems are negative, except as otherwise noted.         Current Outpatient Medications on File Prior to Visit:  albuterol (PROAIR HFA/PROVENTIL HFA/VENTOLIN HFA) 108 (90 Base) MCG/ACT Inhaler Inhale 2 puffs into the lungs every 6 hours as needed for shortness of breath / dyspnea or wheezing   fentaNYL (DURAGESIC) 100 mcg/hr 72 hr patch Place 1 patch onto the skin every 48 hours (Mylan brand)   guaiFENesin (MUCINEX) 600 MG 12 hr tablet Take 1 tablet (600 mg) by mouth 2 times daily   HYDROcodone-acetaminophen (NORCO)  MG per tablet Take 1 tablet by mouth every 4 hours as needed for pain (For 1 time increase only)   HYDROcodone-acetaminophen (NORCO)  MG per tablet Take 1 tablet by mouth every 4 hours as needed for pain (For 1 time increase only)   lisinopril-hydrochlorothiazide (PRINZIDE/ZESTORETIC) 10-12.5 MG tablet Take 1 tablet by mouth daily   mometasone-formoterol (DULERA) 200-5 MCG/ACT inhaler Inhale 2  puffs into the lungs 2 times daily   tiotropium (SPIRIVA RESPIMAT) 2.5 MCG/ACT inhaler Inhale 2 puffs into the lungs daily   tiZANidine (ZANAFLEX) 4 MG tablet Take 1 tablet (4 mg) by mouth 3 times daily   traZODone (DESYREL) 100 MG tablet Take 1.5-2 tablets (150-200 mg) by mouth nightly as needed for sleep   albuterol (PROVENTIL) (2.5 MG/3ML) 0.083% neb solution Take 1 vial (2.5 mg) by nebulization every 4 hours (Patient not taking: Reported on 4/23/2019)   fentaNYL (DURAGESIC) 100 mcg/hr 72 hr patch Place 1 patch onto the skin every 48 hours (Mylan brand)   fentaNYL (DURAGESIC) 100 mcg/hr 72 hr patch Place 1 patch onto the skin every 48 hours (Mylan brand)   HYDROcodone-acetaminophen (NORCO)  MG per tablet Take 1 tablet by mouth every 4 hours as needed for pain (For 1 time increase only)   order for DME Equipment being ordered: Nebulizer   order for DME Equipment being ordered: Superfeet - Blue - Size C (2 pairs please)   ORDER FOR DME Equipment being ordered: Oxygen 4 lpm via nasal cannula continuous flow with any exertion and 2 lpm continuous flow with nasal cannula at rest and at night. Provide portability.  Length of need 99.   predniSONE (DELTASONE) 20 MG tablet 60 MG FOR 3 DAYS, 50 MG FOR  3 DAYS, 40 MG FOR  3 DAYS, 30 MG 2 DAYS, 20 MG 2 DAYS, 10 MG 1 DAY, 5 MG 1 DAY. (Patient not taking: Reported on 4/23/2019.)   [DISCONTINUED] fluticasone-salmeterol (ADVAIR) 500-50 MCG/DOSE diskus inhaler Inhale 1 puff into the lungs 2 times daily     No current facility-administered medications on file prior to visit.        Patient Active Problem List   Diagnosis     Other acne     Chronic neck pain     Obesity     Major depression in partial remission     Fusion of spine of cervical region     CARDIOVASCULAR SCREENING; LDL GOAL LESS THAN 160     Depression with anxiety     Emphysema lung (H)     COPD, severe (H)     Former smoker     Chronic insomnia     Hypoxia     O2 dependent     Tobacco use disorder     Past  Surgical History:   Procedure Laterality Date     C APPENDECTOMY       C  DELIVERY ONLY  ,,    , Low Cervical     C LIGATE FALLOPIAN TUBE      S/P tubal ligation     C TOTAL ABDOM HYSTERECTOMY      not total     pt did not have ovaries removed     HC ENLARGE BREAST WITH IMPLANT       SURGICAL HISTORY OF -   10/18/2005    C6 corpectomy with iliac crest autograft fixation. [facility]       Social History     Tobacco Use     Smoking status: Current Every Day Smoker     Packs/day: 0.75     Years: 35.00     Pack years: 26.25     Types: Cigarettes     Smokeless tobacco: Never Used     Tobacco comment: started age 14.   Substance Use Topics     Alcohol use: Yes     Alcohol/week: 0.0 oz     Comment: occ     Family History   Problem Relation Age of Onset     Cerebrovascular Disease Mother      Chronic Obstructive Pulmonary Disease Mother      Arthritis Father      Diabetes Maternal Grandmother      Cancer Maternal Aunt         breast             Problem list, Medication list, Allergies, and Medical/Social/Surgical histories reviewed in [facility] and updated as appropriate.    OBJECTIVE:                                                    /71   Pulse 110   Temp 98.2  F (36.8  C) (Temporal)   Wt 85.7 kg (189 lb)   SpO2 90%   BMI 33.48 kg/m      GENERAL: healthy, alert and no distress  Chest wall: Focal tenderness to palpation over the first and second rib right next to the sternum on the left side.      Diagnostic test results:  Results for orders placed or performed in visit on 19   XR Chest 2 Views    Narrative    EXAMINATION: XR CHEST 2 VW, 2019 10:32 AM    INDICATION: COPD exacerbation (H)    COMPARISON: CT for comparison 2018, CXR 2013.    FINDINGS: PA and lateral radiographs of the chest. Cervical spinal  fusion hardware. Prominent hilar vasculature. Hyperlucent lungs  bilaterally with apical predominance. Flattening of the diaphragms.  The lower  lung fields are normal in appearance. No focal pulmonary  opacity. The central tracheobronchial tree is patent. The trachea is  midline. The cardiomediastinal silhouette is within normal limits. No  pneumothorax. No pleural effusion. No acute osseous abnormality. Soft  tissue is grossly remarkable. Visualized upper abdomen is grossly  unremarkable.      Impression    IMPRESSION:  1. Hyperlucency of the lung  with flattening of the diaphragms  consistent with chronic emphysematous change. No acute airspace  disease.    I have personally reviewed the examination and initial interpretation  and I agree with the findings.    TERRENCE JAIN MD         ASSESSMENT/PLAN:                                                      53 year old female with the following diagnoses and treatment plan:      ICD-10-CM    1. Costochondritis M94.0 diclofenac (VOLTAREN) 1 % topical gel       --I gave her diclofenac gel to apply topically 4 times a day.  She may supplement with ibuprofen 600 mg 3 times a day with meals as needed.    -- Return in June for her annual wellness and routine monitoring labs.    Will call or return to clinic if worsening or symptoms not improving as discussed.  See Patient Instructions.      Maki Topete MD-PhD  [facility] – [place], [place]    (Note: Chart documentation was done in part with Dragon Voice Recognition software. Although reviewed after completion, some word and grammatical errors may remain.)

## 2019-04-23 NOTE — PATIENT INSTRUCTIONS
Make appointment(s) for:   -- wellness visit with fasting labs in June.       Medication(s) prescribed today:    Orders Placed This Encounter   Medications     diclofenac (VOLTAREN) 1 % topical gel     Sig: Place 2 g onto the skin 4 times daily     Dispense:  100 g     Refill:  1         You  Can also take Ibuprofen 600 mg three times a day as needed to reduce inflammation.     Patient Education     Costochondritis    Costochondritis is inflammation of a rib or the cartilage that connects a rib to your breastbone (sternum). It causes tenderness, and sometimes chest pain may be sharp or aching, or it may feel like pressure. Pain may get worse with deep breathing, movement, or exercise. In some cases, the pain is mistaken for a heart attack. Despite this, the condition is not serious. Read on to learn more about the condition and how it can be treated.  What causes costochondritis?  The cause of costochondritis is not completely clear, but it may happen after a chest injury, chest infection, or coughing episode. Some physical activities can sometimes lead to costochondritis. Large-breasted women may be more likely to have the condition. Often, the reason for the inflammation is unknown.  Diagnosing costochondritis  There is no test for costochondritis. The condition is diagnosed by the symptoms you have. Your healthcare provider will perform a physical exam. He or she will ask you about your symptoms and examine your chest for tenderness. In some cases, tests are done to rule out more serious problems. These tests may include imaging tests such as chest X-ray, CT scan, or an ECG.  Treating costochondritis  If an underlying cause is found, treatment for that will likely relieve the problem. Costochondritis often goes away on its own. The course of the condition varies from person to person. It usually lasts from weeks to months. In some cases, mild symptoms continue for months to years. To ease symptoms:    Take  medicine as directed. These relieve pain and swelling. Ibuprofen or other NSAIDs are often recommended. In some cases, you may be given prescription medicine, such as muscle relaxants.    Avoid activities that put stress on the chest or spine.    Apply a heating pad (set to warm, not too high, heat) to the breastbone several times a day.    Perform stretching exercises as directed.  Call the healthcare provider right away if you have any of the following:    Pain that is not relieved by medicine    Shortness of breath    Lightheadedness, dizziness, or fainting    Feeling of irregular heartbeat or fast pulse  Anyone with chest pain should see a healthcare provider, especially those who are older and may be at risk for heart disease.   Date Last Reviewed: 10/1/2016    [id number] [employer]. [address] [place] [address], [place] [address]. All rights reserved. This information is not intended as a substitute for professional medical care. Always follow your healthcare professional's instructions.

## 2019-04-24 ENCOUNTER — MYC MEDICAL ADVICE (OUTPATIENT)
Dept: PEDIATRICS | Facility: CLINIC | Age: 54
End: 2019-04-24

## 2019-04-24 DIAGNOSIS — R05.9 COUGH: Primary | ICD-10-CM

## 2019-04-24 RX ORDER — BENZONATATE 200 MG/1
200 CAPSULE ORAL 3 TIMES DAILY PRN
Qty: 30 CAPSULE | Refills: 0 | Status: SHIPPED | OUTPATIENT
Start: 2019-04-24 | End: 2020-02-20

## 2019-04-26 NOTE — PROGRESS NOTES
SLEEP MEDICINE CONSULTATION  Sleep Psychology    Name: Jaimee Rodriguez MRN# [id number]   Age: 53 year old YOB: 1965     Date of Consultation: Apr 19, 2019  Consultation is requested by: David Morris Perlman, MD  [address] [address]  [place] [address]  Primary care provider: Maki Topete    Reason for Sleep Consultation     Jaimee Rodriguez is a 53 year old female seen today for a behavioral sleep medicine consultation because of insomnia.      Assessment and Plan     Sleep Diagnoses/Recommendations:    1. Chronic insomnia  Insomnia appears multifactorial and associated with inadequate sleep hygiene, poor stimulus control, poorly consolidated sleep-wake.  And extensive napping throughout the day.  I am also concerned about increased risk for possible sleep apnea in the setting of history of COPD, also possibly raising risks with use of narcotics for hypoventilation, obstructive and central sleep apnea.    To address market sleep fragmentation the first goal is to attempt to consolidate sleep and limiting time in bed to 8.5hours within a sleep window of 10 PM and rise time using alarm 6:30 AM.  We discussed  specific strategies to discriminate activity in the bedroom- used only for sleep-and increasing activity during the day to avoid frequent inadvertent napping.  It is likely to use of narcotic medication increases daytime drowsiness as well.  She indicated that it is easiest for her to stay awake if she sits in her kitchen instead of retreating to her bed or recliner during the day.  She also agreed to use the kitchen as a place to go when she cannot sleep in the middle of the night.    Patient was strongly advised to take trazodone as prescribed at the site at bedtime rather than currently taking it in the middle of the night.  It is quite likely that patient is experiencing drug carryover effects in the morning to late morning.  She also agreed to limit caffeine use to the morning and early afternoon  as late night caffeine use is likely also contributing to sleep fragmentation.  Patient was also advised to pursue continued treatment for co-occurring depression with anxiety.    2. Suspected sleep apnea  The patient is not aware of loud snoring or witnessed apneas.  I am concerned that overall poor health status, COPD in the setting of narcotic use may increase risk for hypoventilation, obstructive and central apnea.  She agreed to see sleep physician colleague for for consultation and further medical evaluation of these factors and other medical issues surrounding sleep difficulties.      See patient instructions for initial treatment recommendations and behavioral sleep plan.    Summary Counseling:      Jaimee was provided information about the pathophysiology of insomnia and psychophysiological factors contributing to the onset and maintenance of insomnia associated with COPD, chronic pain, depression with anxiety..  Treatment option were discussed including component of cognitive-behavioral therapy for insomnia. The benefits and potential early side effects of treatment including increased daytime sleepiness were discussed. She was advised to seek medical advise and consultation around use of or changes to prescription sleep medication, Patient was counseled on the importance of avoiding driving if drowsy.        Follow-up: 4 weeks     History of Present Sleep Complaint     Jaimee Rodriguez is a 53 year old year old female who reports long-standing difficulties with initiating and maintaining sleep and the context of co-occurring depression with anxiety, history of emphysema, chronic pain and COPD.    Her primary complaint is frequent awakenings, poor quality sleep and nonrestorative sleep.  She did complete 2 weeks of sleep diaries which indicated markedly fragmented sleep, cost 24-hour day.  Bedtime varies widely between 8 PM- 1 AM.  Sleep offset also varies markedly between 6-10:30 AM.  Sleep latency is between  5-10 minutes.  Wake after sleep onset is between 120-240 minutes with sleep frequently interrupted and patient reporting sleep continuity rarely lasting more than 90 minutes.  Throughout the late morning and early afternoon she feels tired and drowsy and may nap several times inadvertently throughout the afternoon and early evening.    She indicates that she spends a great deal of her time in bed in the afternoons and throughout the morning.  She describes her bedroom as refuge in a place to retrieve to when in pain, feeling depressed tired or for sleep.  She is rather inactive during the day and frequently will fall asleep when she reclines on the sofa or recliner chair.  She states that she is most awake and alert when staying in the kitchen and sitting at the kitchen table.  During her frequent awakenings in the middle of the night, she will at times get up and go to the kitchen where she may have a snack or sit at the table for a while until she feels sleepy.  She drinks coffee and other caffeinated beverages throughout the day.  Her first caffeine is usually when she wakes up between 6-7 AM.  She will then often feel drowsy or go back to bed and then have another cup of coffee around 10 AM.  This is followed by the middle of the afternoon and early afternoon caffeine on most days.    She reports that she does smoke cigarettes.  She frequently will smoke in the evening and in the middle of the night.    Patient was unable to identify her best sleep.  Or an estimate of how much sleep she needs.  Review of her sleep diary suggest that she is sleeping or dozing cumulatively approximately 8-9 hours/day.  She states that she does not watch that TV or read in bed.    Patient was uncertain as to whether she snores.  She did not know whether anyone else had observed her snoring, gasping or choking.  She does report waking with morning headaches.  She states that she does wake with dry mouth.  She sleeps both on her back  "and side.      Patient uses oxygen for treatment of COPD.    She does not report restless or uncomfortable legs.  There is no reported history of accidents related to sleepiness while driving or operating machinery.  She does not report a history of sleepwalking, sleep talking or other parasomnias.  She does not report any history of hypnagogi anxiety in the setting of attempting to fall asleep or staying asleep.  C hallucinations, sudden sleep attacks, sleep paralysis or cataplexy.  No history of dream enactment.  No history of bruxism.    Jaimee does not report that sleep specific anxiety worry or rumination.  She is concerned about the quality of her sleep but does not report conditioned arousal.    She has been sleep in separate bedrooms.    Currently takes trazodone, 100mg at about 10 PM.  But sometimes also takes in the middle of the night around 3-4 AM awakening.    Previous Sleep Studies:    No previous sleep studies reported      Substance Use:    Tobacco: Smokes cigarettes,   Caffeine: 5-6 caffeinated beverages per day.   Alcohol: None reported  Recreational Drugs: None reported      Screening      Stop bang- 3/8    Leland Sleepiness Scale  Total score - Leland: 13 .    JULIET Total Score: 22       PHQ-9 SCORE 4/19/2019   PHQ-9 Total Score -   PHQ-9 Total Score MyChart -   PHQ-9 Total Score 6       LUIZ-7 SCORE 8/21/2018 2/6/2019 4/19/2019   Total Score - - -   Total Score 0 0 2       Patient Activation Score     ERAN Score (Last Two) 12/23/2011   ERAN Raw Score 26   Activation Score 32.2   ERAN Level 1         Vitals     /88   Pulse 80   Ht 1.6 m (5' 3\")   Wt 87.1 kg (192 lb)   SpO2 (!) 89%   BMI 34.01 kg/m       Medical History     Patient Active Problem List   Diagnosis     Other acne     Chronic neck pain     Obesity     Major depression in partial remission     Fusion of spine of cervical region     CARDIOVASCULAR SCREENING; LDL GOAL LESS THAN 160     Depression with anxiety     Emphysema lung " (H)     COPD, severe (H)     Former smoker     Chronic insomnia     Hypoxia     O2 dependent     Tobacco use disorder        Current Outpatient Medications   Medication Sig Dispense Refill     albuterol (PROAIR HFA/PROVENTIL HFA/VENTOLIN HFA) 108 (90 Base) MCG/ACT Inhaler Inhale 2 puffs into the lungs every 6 hours as needed for shortness of breath / dyspnea or wheezing 1 Inhaler 11     albuterol (PROVENTIL) (2.5 MG/3ML) 0.083% neb solution Take 1 vial (2.5 mg) by nebulization every 4 hours (Patient not taking: Reported on 4/23/2019) 1 Box 0     fentaNYL (DURAGESIC) 100 mcg/hr 72 hr patch Place 1 patch onto the skin every 48 hours (Mylan brand) 15 patch 0     fentaNYL (DURAGESIC) 100 mcg/hr 72 hr patch Place 1 patch onto the skin every 48 hours (Mylan brand) 15 patch 0     fentaNYL (DURAGESIC) 100 mcg/hr 72 hr patch Place 1 patch onto the skin every 48 hours (Mylan brand) 15 patch 0     guaiFENesin (MUCINEX) 600 MG 12 hr tablet Take 1 tablet (600 mg) by mouth 2 times daily 20 tablet 0     HYDROcodone-acetaminophen (NORCO)  MG per tablet Take 1 tablet by mouth every 4 hours as needed for pain (For 1 time increase only) 180 tablet 0     HYDROcodone-acetaminophen (NORCO)  MG per tablet Take 1 tablet by mouth every 4 hours as needed for pain (For 1 time increase only) 180 tablet 0     HYDROcodone-acetaminophen (NORCO)  MG per tablet Take 1 tablet by mouth every 4 hours as needed for pain (For 1 time increase only) 180 tablet 0     lisinopril-hydrochlorothiazide (PRINZIDE/ZESTORETIC) 10-12.5 MG tablet Take 1 tablet by mouth daily 90 tablet 3     mometasone-formoterol (DULERA) 200-5 MCG/ACT inhaler Inhale 2 puffs into the lungs 2 times daily 1 Inhaler 11     order for DME Equipment being ordered: Nebulizer 1 Application 0     order for DME Equipment being ordered: Superfeet - Blue - Size C (2 pairs please) 2 Device 0     ORDER FOR DME Equipment being ordered: Oxygen 4 lpm via nasal cannula continuous  flow with any exertion and 2 lpm continuous flow with nasal cannula at rest and at night. Provide portability.  Length of need 99. 1 Device 1     predniSONE (DELTASONE) 20 MG tablet 60 MG FOR 3 DAYS, 50 MG FOR  3 DAYS, 40 MG FOR  3 DAYS, 30 MG 2 DAYS, 20 MG 2 DAYS, 10 MG 1 DAY, 5 MG 1 DAY. (Patient not taking: Reported on 2019.) 45 tablet 0     tiotropium (SPIRIVA RESPIMAT) 2.5 MCG/ACT inhaler Inhale 2 puffs into the lungs daily 12 g 11     tiZANidine (ZANAFLEX) 4 MG tablet Take 1 tablet (4 mg) by mouth 3 times daily 90 tablet 3     traZODone (DESYREL) 100 MG tablet Take 1.5-2 tablets (150-200 mg) by mouth nightly as needed for sleep 180 tablet 3     benzonatate (TESSALON) 200 MG capsule Take 1 capsule (200 mg) by mouth 3 times daily as needed for cough 30 capsule 0     diclofenac (VOLTAREN) 1 % topical gel Place 2 g onto the skin 4 times daily 100 g 1       Past Surgical History:   Procedure Laterality Date     C APPENDECTOMY       C  DELIVERY ONLY  ,,    , Low Cervical     C LIGATE FALLOPIAN TUBE      S/P tubal ligation     C TOTAL ABDOM HYSTERECTOMY      not total     pt did not have ovaries removed     HC ENLARGE BREAST WITH IMPLANT       SURGICAL HISTORY OF -   10/18/2005    C6 corpectomy with iliac crest autograft fixation. [facility]          Allergies   Allergen Reactions     Morphine          Psychiatric History     Prior Psychiatric Diagnoses: yes, depression with anxiety   Psychiatric Hospitalizations: none   Use of Psychotropics yes, trazodone, for sleep problems, no current antidepressant otherwise.      Chemical Use     Prior Chemical Dependency Treatment: none         Family History     Family History   Problem Relation Age of Onset     Cerebrovascular Disease Mother      Chronic Obstructive Pulmonary Disease Mother      Arthritis Father      Diabetes Maternal Grandmother      Cancer Maternal Aunt         breast       Sleep Disorders: Reports that  father may have had sleep apnea.    Social History     Social History     Socioeconomic History     Marital status:      Spouse name: None     Number of children: None     Years of education: None     Highest education level: None   Occupational History     None   Social Needs     Financial resource strain: None     Food insecurity:     Worry: None     Inability: None     Transportation needs:     Medical: None     Non-medical: None   Tobacco Use     Smoking status: Current Every Day Smoker     Packs/day: 0.75     Years: 35.00     Pack years: 26.25     Types: Cigarettes     Smokeless tobacco: Never Used     Tobacco comment: started age 14.   Substance and Sexual Activity     Alcohol use: Yes     Alcohol/week: 0.0 oz     Comment: occ     Drug use: No     Sexual activity: Yes     Partners: Male     Birth control/protection: Surgical   Lifestyle     Physical activity:     Days per week: None     Minutes per session: None     Stress: None   Relationships     Social connections:     Talks on phone: None     Gets together: None     Attends Yarsanism service: None     Active member of club or organization: None     Attends meetings of clubs or organizations: None     Relationship status: None     Intimate partner violence:     Fear of current or ex partner: None     Emotionally abused: None     Physically abused: None     Forced sexual activity: None   Other Topics Concern      Service No     Blood Transfusions No     Caffeine Concern No     Occupational Exposure No     Hobby Hazards No     Sleep Concern Yes     Stress Concern No     Weight Concern No     Special Diet No     Comment: not a milk drinker     Back Care No     Exercise No     Comment: walks at work     Bike Helmet Not Asked     Seat Belt Yes     Self-Exams No     Parent/sibling w/ CABG, MI or angioplasty before 65F 55M? Not Asked   Social History Narrative     None                                                                                           Mental Status Examination     Jaimee presented as appropriately dressed and groomed and was oriented X3 with speech language intact.  The patient was cooperative throughout the evaluation with no signs of hallucinations, delusions, loosening of associations or other thought disturbance.  Mood was stable.  Affect was congruent with mood. Insight and judgement we intact.  Memory was intact for recent and remote elements.  There was no report of suicidal ideation, intention or plan. Attention and concentration were within normal.      Time Spent: 50 minutes    Copy:   Ho, Libin David Morris Perlman, MD  [address] [facility] [address]  [place] [address]    Raheel Martinez PsyD, LP, [facility]  Diplomate, Behavioral Sleep Medicine  [facility]  [facility] [facility] [place]

## 2019-05-01 DIAGNOSIS — R05.9 COUGH: ICD-10-CM

## 2019-05-01 RX ORDER — BENZONATATE 200 MG/1
200 CAPSULE ORAL 3 TIMES DAILY PRN
Qty: 30 CAPSULE | Refills: 0 | OUTPATIENT
Start: 2019-05-01

## 2019-05-01 NOTE — TELEPHONE ENCOUNTER
BENZONATATE 200MG CAPSULES  Last Written Prescription Date:  04/24/2019  Last Fill Quantity: 30 TALBETS,  # refills: 0   Last office visit: 4/23/2019 with prescribing provider:    Last refill per Pharmacy   Future Office Visit:      Sky Briggs CMA ([facility])

## 2019-05-01 NOTE — TELEPHONE ENCOUNTER
Treated 4/24 for bronchitis. Called patient- she has some left and denies a refill at this time. She will give us a few days notice before it is needed.   Yesenia Corral RN

## 2019-05-18 ENCOUNTER — MYC REFILL (OUTPATIENT)
Dept: PEDIATRICS | Facility: CLINIC | Age: 54
End: 2019-05-18

## 2019-05-18 DIAGNOSIS — M43.22 FUSION OF SPINE OF CERVICAL REGION: ICD-10-CM

## 2019-05-18 DIAGNOSIS — Z79.891 LONG TERM CURRENT USE OF OPIATE ANALGESIC: ICD-10-CM

## 2019-05-18 DIAGNOSIS — M54.2 CHRONIC NECK PAIN: ICD-10-CM

## 2019-05-18 DIAGNOSIS — Z98.1 STATUS POST CERVICAL SPINAL ARTHRODESIS: ICD-10-CM

## 2019-05-18 DIAGNOSIS — G89.29 CHRONIC NECK PAIN: ICD-10-CM

## 2019-05-20 NOTE — TELEPHONE ENCOUNTER
Norco      Last Written Prescription Date:  4/23  Last Fill Quantity: 180,   # refills: 0  Last Office Visit: 4/23/19  Future Office visit:    Next 5 appointments (look out 90 days)    Jun 20, 2019  8:10 AM CDT  PHYSICAL with Maki Topete MD PhD  [facility] [address] [place] [id number] [phone]         Fentanyl      Last Written Prescription Date:  4/23  Last Fill Quantity: 15,   # refills: 0  Last Office Visit: 4/23  Future Office visit:    Next 5 appointments (look out 90 days)    Jun 20, 2019  8:10 AM CDT  PHYSICAL with Maki Topete MD PhD  [facility] ([facility]) [address] [place] [id number] [phone]           Routing refill request to provider for review/approval because:  Drug not on the [facility], P or [facility] refill protocol or controlled substance

## 2019-05-21 RX ORDER — HYDROCODONE BITARTRATE AND ACETAMINOPHEN 10; 325 MG/1; MG/1
1 TABLET ORAL EVERY 4 HOURS PRN
Qty: 180 TABLET | Refills: 0 | Status: SHIPPED | OUTPATIENT
Start: 2019-05-23 | End: 2019-06-20

## 2019-05-21 RX ORDER — FENTANYL 100 UG/1
1 PATCH TRANSDERMAL
Qty: 15 PATCH | Refills: 0 | Status: SHIPPED | OUTPATIENT
Start: 2019-05-23 | End: 2019-06-20

## 2019-05-22 ENCOUNTER — TRANSFERRED RECORDS (OUTPATIENT)
Dept: HEALTH INFORMATION MANAGEMENT | Facility: CLINIC | Age: 54
End: 2019-05-22

## 2019-06-20 ENCOUNTER — OFFICE VISIT (OUTPATIENT)
Dept: PEDIATRICS | Facility: CLINIC | Age: 54
End: 2019-06-20
Payer: COMMERCIAL

## 2019-06-20 ENCOUNTER — TELEPHONE (OUTPATIENT)
Dept: PEDIATRICS | Facility: CLINIC | Age: 54
End: 2019-06-20

## 2019-06-20 ENCOUNTER — MYC MEDICAL ADVICE (OUTPATIENT)
Dept: PEDIATRICS | Facility: CLINIC | Age: 54
End: 2019-06-20

## 2019-06-20 VITALS
DIASTOLIC BLOOD PRESSURE: 64 MMHG | TEMPERATURE: 97.9 F | BODY MASS INDEX: 34.23 KG/M2 | OXYGEN SATURATION: 92 % | HEART RATE: 74 BPM | HEIGHT: 62 IN | SYSTOLIC BLOOD PRESSURE: 96 MMHG | WEIGHT: 186 LBS

## 2019-06-20 DIAGNOSIS — Z87.891 PERSONAL HISTORY OF TOBACCO USE, PRESENTING HAZARDS TO HEALTH: ICD-10-CM

## 2019-06-20 DIAGNOSIS — J20.9 ACUTE BRONCHITIS WITH COEXISTING CONDITION REQUIRING PROPHYLACTIC TREATMENT: ICD-10-CM

## 2019-06-20 DIAGNOSIS — M54.2 CHRONIC NECK PAIN: ICD-10-CM

## 2019-06-20 DIAGNOSIS — Z79.891 LONG TERM CURRENT USE OF OPIATE ANALGESIC: ICD-10-CM

## 2019-06-20 DIAGNOSIS — F17.200 TOBACCO USE DISORDER: ICD-10-CM

## 2019-06-20 DIAGNOSIS — M43.22 FUSION OF SPINE OF CERVICAL REGION: ICD-10-CM

## 2019-06-20 DIAGNOSIS — I10 HYPERTENSION GOAL BP (BLOOD PRESSURE) < 140/90: ICD-10-CM

## 2019-06-20 DIAGNOSIS — Z98.1 STATUS POST CERVICAL SPINAL ARTHRODESIS: ICD-10-CM

## 2019-06-20 DIAGNOSIS — J44.9 COPD, SEVERE (H): ICD-10-CM

## 2019-06-20 DIAGNOSIS — Z00.00 MEDICARE ANNUAL WELLNESS VISIT, SUBSEQUENT: Primary | ICD-10-CM

## 2019-06-20 DIAGNOSIS — G89.29 CHRONIC NECK PAIN: ICD-10-CM

## 2019-06-20 DIAGNOSIS — Z12.31 ENCOUNTER FOR SCREENING MAMMOGRAM FOR BREAST CANCER: ICD-10-CM

## 2019-06-20 DIAGNOSIS — Z13.220 LIPID SCREENING: ICD-10-CM

## 2019-06-20 LAB
ANION GAP SERPL CALCULATED.3IONS-SCNC: 4 MMOL/L (ref 3–14)
BUN SERPL-MCNC: 19 MG/DL (ref 7–30)
CALCIUM SERPL-MCNC: 9.4 MG/DL (ref 8.5–10.1)
CHLORIDE SERPL-SCNC: 101 MMOL/L (ref 94–109)
CHOLEST SERPL-MCNC: 194 MG/DL
CO2 SERPL-SCNC: 32 MMOL/L (ref 20–32)
CREAT SERPL-MCNC: 0.92 MG/DL (ref 0.52–1.04)
CREAT UR-MCNC: 164 MG/DL
GFR SERPL CREATININE-BSD FRML MDRD: 70 ML/MIN/{1.73_M2}
GLUCOSE SERPL-MCNC: 99 MG/DL (ref 70–99)
HDLC SERPL-MCNC: 56 MG/DL
LDLC SERPL CALC-MCNC: 109 MG/DL
MICROALBUMIN UR-MCNC: 34 MG/L
MICROALBUMIN/CREAT UR: 20.61 MG/G CR (ref 0–25)
NONHDLC SERPL-MCNC: 138 MG/DL
POTASSIUM SERPL-SCNC: 4.2 MMOL/L (ref 3.4–5.3)
SODIUM SERPL-SCNC: 137 MMOL/L (ref 133–144)
TRIGL SERPL-MCNC: 145 MG/DL

## 2019-06-20 PROCEDURE — 99214 OFFICE O/P EST MOD 30 MIN: CPT | Mod: 25 | Performed by: INTERNAL MEDICINE

## 2019-06-20 PROCEDURE — 80048 BASIC METABOLIC PNL TOTAL CA: CPT | Performed by: INTERNAL MEDICINE

## 2019-06-20 PROCEDURE — 36415 COLL VENOUS BLD VENIPUNCTURE: CPT | Performed by: INTERNAL MEDICINE

## 2019-06-20 PROCEDURE — 80061 LIPID PANEL: CPT | Performed by: INTERNAL MEDICINE

## 2019-06-20 PROCEDURE — 82043 UR ALBUMIN QUANTITATIVE: CPT | Performed by: INTERNAL MEDICINE

## 2019-06-20 PROCEDURE — G0439 PPPS, SUBSEQ VISIT: HCPCS | Performed by: INTERNAL MEDICINE

## 2019-06-20 RX ORDER — HYDROCODONE BITARTRATE AND ACETAMINOPHEN 10; 325 MG/1; MG/1
1 TABLET ORAL EVERY 4 HOURS PRN
Qty: 180 TABLET | Refills: 0 | Status: SHIPPED | OUTPATIENT
Start: 2019-06-22 | End: 2019-07-18

## 2019-06-20 RX ORDER — AZITHROMYCIN 250 MG/1
TABLET, FILM COATED ORAL
Qty: 6 TABLET | Refills: 0 | Status: SHIPPED | OUTPATIENT
Start: 2019-06-20 | End: 2019-06-25

## 2019-06-20 RX ORDER — FENTANYL 100 UG/1
1 PATCH TRANSDERMAL
Qty: 15 PATCH | Refills: 0 | Status: SHIPPED | OUTPATIENT
Start: 2019-06-22 | End: 2019-07-18

## 2019-06-20 ASSESSMENT — ANXIETY QUESTIONNAIRES
3. WORRYING TOO MUCH ABOUT DIFFERENT THINGS: NOT AT ALL
7. FEELING AFRAID AS IF SOMETHING AWFUL MIGHT HAPPEN: NOT AT ALL
2. NOT BEING ABLE TO STOP OR CONTROL WORRYING: NOT AT ALL
GAD7 TOTAL SCORE: 1
6. BECOMING EASILY ANNOYED OR IRRITABLE: SEVERAL DAYS
5. BEING SO RESTLESS THAT IT IS HARD TO SIT STILL: NOT AT ALL
1. FEELING NERVOUS, ANXIOUS, OR ON EDGE: NOT AT ALL

## 2019-06-20 ASSESSMENT — PATIENT HEALTH QUESTIONNAIRE - PHQ9
SUM OF ALL RESPONSES TO PHQ QUESTIONS 1-9: 7
5. POOR APPETITE OR OVEREATING: NOT AT ALL

## 2019-06-20 ASSESSMENT — MIFFLIN-ST. JEOR: SCORE: 1400.91

## 2019-06-20 NOTE — TELEPHONE ENCOUNTER
Central Prior Authorization Team   Phone: [phone]    PA Initiation    Medication: naloxone (EVZIO) 0.4 MG/0.4ML auto-injector  Insurance Company: [employer] - Phone [phone] Fax [phone]  Pharmacy Filling the Rx: [facility] [id number] [address]  Filling Pharmacy Phone: [phone]  Filling Pharmacy Fax:    Start Date: 6/20/2019    Received prior authorization form from [employer]. Form filled out and faxed back to insurance at [phone].

## 2019-06-20 NOTE — PATIENT INSTRUCTIONS
Make appointment(s) for:   -- clinic follow up in 6 months  -- mammogram        Additional Instructions:   1. Remind me next month to order the lung cancer screening CT.   2. Check with your pharmacy regarding coverage for Shingrix (RZV) - the new shingles vaccine and Tetanus vaccine (Td).  3. Send in the stool sample for colon cancer screening        Medication(s) prescribed today:    Orders Placed This Encounter   Medications     HYDROcodone-acetaminophen (NORCO)  MG per tablet     Sig: Take 1 tablet by mouth every 4 hours as needed for pain (For 1 time increase only)     Dispense:  180 tablet     Refill:  0     fentaNYL (DURAGESIC) 100 mcg/hr 72 hr patch     Sig: Place 1 patch onto the skin every 48 hours (Mylan brand)     Dispense:  15 patch     Refill:  0     naloxone (EVZIO) 0.4 MG/0.4ML auto-injector     Sig: Inject 0.4 mLs (0.4 mg) into the muscle as needed for opioid reversal every 2-3 minutes until assistance arrives     Dispense:  0.8 mL     Refill:  0     azithromycin (ZITHROMAX) 250 MG tablet     Sig: Take 2 tablets (500 mg) by mouth daily for 1 day, THEN 1 tablet (250 mg) daily for 4 days.     Dispense:  6 tablet     Refill:  0         Preventive Health Recommendations  Female Ages 50 - 64    Yearly exam: See your health care provider every year in order to  o Review health changes.   o Discuss preventive care.    o Review your medicines if your doctor has prescribed any.      Get a Pap test every three years (unless you have an abnormal result and your provider advises testing more often).    If you get Pap tests with HPV test, you only need to test every 5 years, unless you have an abnormal result.     You do not need a Pap test if your uterus was removed (hysterectomy) and you have not had cancer.    You should be tested each year for STDs (sexually transmitted diseases) if you're at risk.     Have a mammogram every 1 to 2 years.    Have a colonoscopy at age 50, or have a yearly FIT test  (stool test). These exams screen for colon cancer.      Have a cholesterol test every 5 years, or more often if advised.    Have a diabetes test (fasting glucose) every three years. If you are at risk for diabetes, you should have this test more often.     If you are at risk for osteoporosis (brittle bone disease), think about having a bone density scan (DEXA).    Shots: Get a flu shot each year. Get a tetanus shot every 10 years.    Nutrition:     Eat at least 5 servings of fruits and vegetables each day.    Eat whole-grain bread, whole-wheat pasta and brown rice instead of white grains and rice.    Get adequate Calcium and Vitamin D.     Lifestyle    Exercise at least 150 minutes a week (30 minutes a day, 5 days a week). This will help you control your weight and prevent disease.    Limit alcohol to one drink per day.    No smoking.     Wear sunscreen to prevent skin cancer.     See your dentist every six months for an exam and cleaning.    See your eye doctor every 1 to 2 years.    Patient Education   Personalized Prevention Plan  You are due for the preventive services outlined below.  Your care team is available to assist you in scheduling these services.  If you have already completed any of these items, please share that information with your care team to update in your medical record.  Health Maintenance Due   Topic Date Due     COPD Action Plan  1965     Zoster (Shingles) Vaccine (1 of 2) 07/05/2014     FIT Test  03/12/2018     Mammogram  03/15/2019     Diptheria Tetanus Pertussis (DTAP/TDAP/TD) Vaccine (2 - Td) 03/18/2019     Kidney Function Urine Test  06/15/2019

## 2019-06-20 NOTE — PROGRESS NOTES
"  SUBJECTIVE:   Jaimee Rodriguez is a 53 year old female who presents for Preventive Visit.    HPI:  Patient has a history of severe COPD, home oxygen dependent.  She also has history of chronic neck pain on long-term opioids.  She is on Social Security disability.    She would like to have a letter stating that she has long-term chronic health issues   For her 401(k).    She started cough and sore throat 3 days ago. Has felt more chest tightness. No fever.   She had bad bronchitis. She was given solumedrol IM, then high dose prednisone and albuterol neb. The prednisone made her very angry after 3 days on 60 mg. The neb made her feel like she couldn't breath. So she quit taking them. She is on Dulera and albuterol inhaler. No problem with inhaler use.   Are you in the first 12 months of your Medicare Part B coverage?  No    Physical Health:    In general, how would you rate your overall physical health? fair    Outside of work, how many days during the week do you exercise? none    Outside of work, approximately how many minutes a day do you exercise?not applicable    If you drink alcohol do you typically have >3 drinks per day or >7 drinks per week? No    Do you usually eat at least 4 servings of fruit and vegetables a day, include whole grains & fiber and avoid regularly eating high fat or \"junk\" foods? Yes    Do you have any problems taking medications regularly?  No    Do you have any side effects from medications? none    Needs assistance for the following daily activities: no assistance needed    Which of the following safety concerns are present in your home?  none identified     Hearing impairment: No    In the past 6 months, have you been bothered by leaking of urine? no    Mental Health:    In general, how would you rate your overall mental or emotional health? fair  PHQ-2 Score: 0    Do you feel safe in your environment? Yes    Do you have a Health Care Directive? No: Advance care planning was reviewed with " patient; patient declined at this time.    Additional concerns to address?  No    Fall risk:  Fallen 2 or more times in the past year?: No  Any fall with injury in the past year?: No    Cognitive Screenin) Repeat 3 items (Leader, Season, Table)    2) Clock draw: NORMAL  3) 3 item recall: Recalls 3 objects  Results: 3 items recalled: COGNITIVE IMPAIRMENT LESS LIKELY    Mini-CogTM Copyright HUSAM Shah. Licensed by the author for use in [facility]; reprinted with permission (maria dolores@[facility]). All rights reserved.      Do you have sleep apnea, excessive snoring or daytime drowsiness?: no        -------------------------------------    Reviewed and updated as needed this visit by clinical staff  Tobacco  Allergies  Meds  Med Hx  Surg Hx  Fam Hx  Soc Hx        Reviewed and updated as needed this visit by Provider        Social History     Tobacco Use     Smoking status: Current Every Day Smoker     Packs/day: 0.75     Years: 39.00     Pack years: 29.25     Types: Cigarettes     Smokeless tobacco: Never Used     Tobacco comment: started age 14.   Substance Use Topics     Alcohol use: Yes     Alcohol/week: 0.0 oz     Comment: occ                           Current providers sharing in care for this patient include:   Patient Care Team:  Maki Topete MD PhD as PCP - General (Internal Medicine)  Perlman, David Morris, MD as Assigned PCP    The following health maintenance items are reviewed in Epic and correct as of today:  Health Maintenance   Topic Date Due     COPD ACTION PLAN  1965     ZOSTER IMMUNIZATION (1 of 2) 2014     FIT  2018     MAMMO SCREENING  03/15/2019     DTAP/TDAP/TD IMMUNIZATION (2 - Td) 2019     MICROALBUMIN  06/15/2019     PHQ-9  10/19/2019     URINE DRUG SCREEN  2020     BMP  2020     LUIZ ASSESSMENT  2020     LIPID  06/15/2023     MEDICARE ANNUAL WELLNESS VISIT  2029     SPIROMETRY  Completed     HEPATITIS C SCREENING  Completed     HIV  "SCREENING  Completed     DEPRESSION ACTION PLAN  Completed     INFLUENZA VACCINE  Addressed     IPV IMMUNIZATION  Aged Out     MENINGITIS IMMUNIZATION  Aged Out     Labs reviewed in EPIC  Mammogram Screening: Mammogram Screening: Patient over age 50, mutual decision to screen reflected in health maintenance.    ROS:  Constitutional, HEENT, cardiovascular, pulmonary, gi and gu systems are negative, except as otherwise noted.    OBJECTIVE:   BP 96/64   Pulse 74   Temp 97.9  F (36.6  C) (Temporal)   Ht 1.581 m (5' 2.25\")   Wt 84.4 kg (186 lb)   SpO2 92%   BMI 33.75 kg/m   Estimated body mass index is 33.75 kg/m  as calculated from the following:    Height as of this encounter: 1.581 m (5' 2.25\").    Weight as of this encounter: 84.4 kg (186 lb).  EXAM:   GENERAL: healthy, alert and no distress  EYES: Eyes grossly normal to inspection, PERRL and conjunctivae and sclerae normal  HENT: ear canals and TM's normal, nose and mouth without ulcers or lesions  NECK: no adenopathy, no asymmetry, masses, or scars and thyroid normal to palpation  RESP: lungs clear to auscultation - no rales, rhonchi or wheezes  BREAST: normal without masses, tenderness or nipple discharge and no palpable axillary masses or adenopathy  CV: regular rate and rhythm, normal S1 S2, no S3 or S4, no murmur, click or rub, no peripheral edema and peripheral pulses strong  ABDOMEN: soft, nontender, no hepatosplenomegaly, no masses and bowel sounds normal  MS: no gross musculoskeletal defects noted, no edema  SKIN: no suspicious lesions or rashes  NEURO: Normal strength and tone, mentation intact and speech normal  PSYCH: mentation appears normal, affect normal/bright    Diagnostic Test Results:  Results for orders placed or performed in visit on 06/20/19 (from the past 24 hour(s))   Basic metabolic panel   Result Value Ref Range    Sodium 137 133 - 144 mmol/L    Potassium 4.2 3.4 - 5.3 mmol/L    Chloride 101 94 - 109 mmol/L    Carbon Dioxide 32 20 - " 32 mmol/L    Anion Gap 4 3 - 14 mmol/L    Glucose 99 70 - 99 mg/dL    Urea Nitrogen 19 7 - 30 mg/dL    Creatinine 0.92 0.52 - 1.04 mg/dL    GFR Estimate 70 >60 mL/min/[1.73_m2]    GFR Estimate If Black 81 >60 mL/min/[1.73_m2]    Calcium 9.4 8.5 - 10.1 mg/dL   Albumin Random Urine Quantitative with Creat Ratio   Result Value Ref Range    Creatinine Urine 164 mg/dL    Albumin Urine mg/L 34 mg/L    Albumin Urine mg/g Cr 20.61 0 - 25 mg/g Cr   Lipid panel reflex to direct LDL Fasting   Result Value Ref Range    Cholesterol 194 <200 mg/dL    Triglycerides 145 <150 mg/dL    HDL Cholesterol 56 >49 mg/dL    LDL Cholesterol Calculated 109 (H) <100 mg/dL    Non HDL Cholesterol 138 (H) <130 mg/dL       ASSESSMENT / PLAN:       ICD-10-CM    1. Medicare annual wellness visit, subsequent Z00.00    2. Chronic neck pain M54.2 HYDROcodone-acetaminophen (NORCO)  MG per tablet    G89.29 fentaNYL (DURAGESIC) 100 mcg/hr 72 hr patch   3. Fusion of spine of cervical region M43.22 HYDROcodone-acetaminophen (NORCO)  MG per tablet     fentaNYL (DURAGESIC) 100 mcg/hr 72 hr patch   4. Long term current use of opiate analgesic Z79.891 HYDROcodone-acetaminophen (NORCO)  MG per tablet     fentaNYL (DURAGESIC) 100 mcg/hr 72 hr patch     naloxone (EVZIO) 0.4 MG/0.4ML auto-injector   5. Status post cervical spinal arthrodesis Z98.1 HYDROcodone-acetaminophen (NORCO)  MG per tablet   6. Encounter for screening mammogram for breast cancer Z12.31 [place] Screening Digital Bilateral   7. Tobacco use disorder F17.200    8. COPD, severe (H) J44.9 azithromycin (ZITHROMAX) 250 MG tablet   9. Acute bronchitis with coexisting condition requiring prophylactic treatment J20.9 azithromycin (ZITHROMAX) 250 MG tablet   10. Personal history of tobacco use, presenting hazards to health Z87.891    11. Hypertension goal BP (blood pressure) < 140/90 I10      --Acute bronchitis: Given the severity of her COPD and oxygen need, I elected to treat her  "empirically with Z-Syed.  She can also double up on the Dulera for 3 to 5 days, in place of oral steroids.  -- Chronic pain medication refilled.  She has not been given Narcan in the past, this was discussed, will give her prescription of Narcan in case of incidental overdose.  --Hypertension: Blood pressure is in the lowish side.  Patient reports normally she runs in the 120s.  No change in her current blood pressure medication.  --Tobacco use disorder: Patient reported she is cutting down on smoking to about half pack a day, from 3/4 pack a day.  She would qualify for lung cancer screening CT when she turns 54, which would be in July.  Patient will contact me in July for the order.  Patient declined assistance for smoking cessation.  -- letter done for documenting chronic illnesses.    Preventive screening/immunizations:   Mammogram: due, ordered.   Colon cancer screening: send in stool sample for FIT testing  Immunizations: up to date   -- Shingrix (RZV) and Td advised. Pt will check insurance coverage.     End of Life Planning:  Patient currently has an advanced directive: No.  I have verified the patient's ablity to prepare an advanced directive/make health care decisions.  Literature was provided to assist patient in preparing an advanced directive.    COUNSELING:  Reviewed preventive health counseling, as reflected in patient instructions    Estimated body mass index is 33.75 kg/m  as calculated from the following:    Height as of this encounter: 1.581 m (5' 2.25\").    Weight as of this encounter: 84.4 kg (186 lb).         reports that she has been smoking cigarettes.  She has a 29.25 pack-year smoking history. She has never used smokeless tobacco.      Appropriate preventive services were discussed with this patient, including applicable screening as appropriate for cardiovascular disease, diabetes, osteopenia/osteoporosis, and glaucoma.  As appropriate for age/gender, discussed screening for colorectal " cancer, prostate cancer, breast cancer, and cervical cancer. Checklist reviewing preventive services available has been given to the patient.    Reviewed patients plan of care and provided an AVS. The Intermediate Care Plan ( asthma action plan, low back pain action plan, and migraine action plan) for Jaimee meets the Care Plan requirement. This Care Plan has been established and reviewed with the Patient.    Counseling Resources:  ATP IV Guidelines  Pooled Cohorts Equation Calculator  Breast Cancer Risk Calculator  FRAX Risk Assessment  ICSI Preventive Guidelines  Dietary Guidelines for Americans, 2010  USDA's MyPlate  ASA Prophylaxis  Lung CA Screening    Maki Topete MD PhD  [facility]

## 2019-06-20 NOTE — LETTER
June 20, 2019      Jaimee Rodriguez  [address] [address]              To Whom It May Concern,    Jaimee Rodriguez has multiple chronic illnesses including severe COPD with oxygen dependence, chronic pain, depression, and hypertension. She is on multiple medications. She has been disabled. These chronic conditions will be long term for the rest of her life.       Should you have any questions, please feel free to contact me at the address above.        Sincerely,        Maki Topete MD PhD

## 2019-06-21 ASSESSMENT — ANXIETY QUESTIONNAIRES: GAD7 TOTAL SCORE: 1

## 2019-06-22 ENCOUNTER — MYC MEDICAL ADVICE (OUTPATIENT)
Dept: PEDIATRICS | Facility: CLINIC | Age: 54
End: 2019-06-22

## 2019-06-22 DIAGNOSIS — Z79.891 LONG TERM CURRENT USE OF OPIATE ANALGESIC: ICD-10-CM

## 2019-06-24 NOTE — TELEPHONE ENCOUNTER
Routing to Dr. Topete's team to review medication request      Tennille Quiroga RN, [facility]

## 2019-06-25 ENCOUNTER — TELEPHONE (OUTPATIENT)
Dept: PEDIATRICS | Facility: CLINIC | Age: 54
End: 2019-06-25

## 2019-06-25 NOTE — TELEPHONE ENCOUNTER
[facility] Call Center    Phone Message    May a detailed message be left on voicemail: yes    Reason for Call: Medication Question or concern regarding medication   Prescription Clarification  Name of Medication: naloxone (EVZIO) 2 mg MG/0.4ML auto-injector  Prescribing Provider: dr cochran   Pharmacy:    [facility] [address] [place] [address] [id number] [address]  What on the order needs clarification? Dosage needs to be updated and script sent over/ Please give pharmacy a call to discuss [phone]          Action Taken: Message routed to:  Primary Care p [phone]

## 2019-06-25 NOTE — TELEPHONE ENCOUNTER
Central Prior Authorization Team   Phone: [phone]    Prior Authorization Approval    Authorization Effective Date: 5/22/2019  Authorization Expiration Date: 6/20/2020  Medication: naloxone (EVZIO) 0.4 MG/0.4ML auto-injector-Approved-  Approved Dose/Quantity:   Reference #:     Insurance Company: [employer] - Phone [phone] Fax [phone]  Expected CoPay:       CoPay Card Available:      Foundation Assistance Needed:    Which Pharmacy is filling the prescription (Not needed for infusion/clinic administered): [facility] [id number] [place] [address] [id number] [address]  Pharmacy Notified: Yes  Patient Notified: No

## 2019-06-25 NOTE — TELEPHONE ENCOUNTER
PA approval was for Evzio 2mg as that it that only way it is produced now. Pharmacy will substitute script.  Yesenia Corral RN

## 2019-06-28 ENCOUNTER — MYC MEDICAL ADVICE (OUTPATIENT)
Dept: PEDIATRICS | Facility: CLINIC | Age: 54
End: 2019-06-28

## 2019-06-28 ENCOUNTER — TELEPHONE (OUTPATIENT)
Dept: PEDIATRICS | Facility: CLINIC | Age: 54
End: 2019-06-28

## 2019-06-28 DIAGNOSIS — Z79.891 LONG TERM CURRENT USE OF OPIATE ANALGESIC: ICD-10-CM

## 2019-06-28 DIAGNOSIS — F17.200 TOBACCO USE DISORDER: Primary | ICD-10-CM

## 2019-06-28 RX ORDER — NALOXONE HYDROCHLORIDE 2 MG/.4ML
2 INJECTION, SOLUTION INTRAMUSCULAR; SUBCUTANEOUS PRN
Qty: 2 ML | Refills: 0 | Status: SHIPPED | OUTPATIENT
Start: 2019-06-28 | End: 2021-01-01

## 2019-06-28 NOTE — TELEPHONE ENCOUNTER
Routing to provider to review and advise.    Trudy Alaniz, RN, BSN, PHN  [facility]

## 2019-06-28 NOTE — TELEPHONE ENCOUNTER
Health Call Center    Phone Message    May a detailed message be left on voicemail: yes    Reason for Call: Patient called to request a call from provider to discuss her prescription for naloxone (EVZIO) 0.4 MG/0.4ML auto-injector [[id number]] (Order [id number]). Patient has questions regarding getting a new jessica prescription.Patient request a call back to discuss. Please advise.       Action Taken: Message routed to:  Primary Care p [phone]

## 2019-06-28 NOTE — TELEPHONE ENCOUNTER
I can send her the nasal spray to see if this is cheaper rather than going everywhere to check cheaper price. If she is ok with that, I will send the nasal spray. Otherwise I can do a paper print Rx that she can .

## 2019-06-28 NOTE — TELEPHONE ENCOUNTER
Patient states it costs her insurance pays $4,000 and expires in 4 months. The pharmacy sent it back and the  said that was all they have.   She wants a written script so she can take it to different pharmacies to find the longest expiration date.  Evzio 2mg is the only way it is produced now, pended.    Please call when signed and patient will come in and pick it up.   Yesenia Corral RN

## 2019-06-28 NOTE — TELEPHONE ENCOUNTER
Called patient and she denies the nasal spray at this time. She prefers the paper script please.  Yesenia Corral RN

## 2019-06-28 NOTE — TELEPHONE ENCOUNTER
Rx placed at  Check in C     Called patient and informed.      Tennille Quiroga RN, [facility]

## 2019-06-30 NOTE — RESULT ENCOUNTER NOTE
Dear Jaimee,   Your recent lab results showed the following:  --Lipid panel is borderline elevated. Prescription medication is not needed at this time. Healthy eating with low fat low cholesterol diet, exercise 30 minutes 3-5 times a week will help improve cholesterol. We'll monitor this annually with your physical.        Please call or Mychart to our office if you have further questions.     Maki Topete MD-PhD

## 2019-07-03 ENCOUNTER — MYC MEDICAL ADVICE (OUTPATIENT)
Dept: PEDIATRICS | Facility: CLINIC | Age: 54
End: 2019-07-03

## 2019-07-05 NOTE — TELEPHONE ENCOUNTER
Forms located on provider desk - patient dropped off on 6/24/19    [facility] CLINICAL DOCUMENTATION    Form Documentation Form or Letter Request    Type or form/letter needing completion: [facility] - Attending Physician's Statement - Progress report form  Provider: Dr. Maki Topete  Has provider seen patient for office visit related to reason for form request? Yes  Date form needed: ASAP - as indicated on form intake form filled out by patient  Once completed: Patient will  at .    Form missing Dr. Maki Topete's signature. Sent [employer] to inform patient.  Emory RIOJAS, CMA

## 2019-07-18 ENCOUNTER — MYC REFILL (OUTPATIENT)
Dept: PEDIATRICS | Facility: CLINIC | Age: 54
End: 2019-07-18

## 2019-07-18 DIAGNOSIS — M43.22 FUSION OF SPINE OF CERVICAL REGION: ICD-10-CM

## 2019-07-18 DIAGNOSIS — Z98.1 STATUS POST CERVICAL SPINAL ARTHRODESIS: ICD-10-CM

## 2019-07-18 DIAGNOSIS — M54.2 CHRONIC NECK PAIN: ICD-10-CM

## 2019-07-18 DIAGNOSIS — Z79.891 LONG TERM CURRENT USE OF OPIATE ANALGESIC: ICD-10-CM

## 2019-07-18 DIAGNOSIS — G89.29 CHRONIC NECK PAIN: ICD-10-CM

## 2019-07-18 NOTE — TELEPHONE ENCOUNTER
HYDROcodone-acetaminophen (NORCO)  MG per tablet  Last Written Prescription Date:  6/22/19  Last Fill Quantity: 180,  # refills: 0   Last office visit: 6/20/2019 with prescribing provider:     Future Office Visit:      fentaNYL (DURAGESIC) 100 mcg/hr 72 hr patch  Last Written Prescription Date:  6/22/19  Last Fill Quantity: 15,  # refills: 0   Last office visit: 6/20/2019 with prescribing provider:     Future Office Visit:        Routing refill request to provider for review/approval because:  Drug not on the [facility] refill protocol     Trudy Alaniz, RN, BSN, PHN  [facility]

## 2019-07-19 RX ORDER — HYDROCODONE BITARTRATE AND ACETAMINOPHEN 10; 325 MG/1; MG/1
1 TABLET ORAL EVERY 4 HOURS PRN
Qty: 180 TABLET | Refills: 0 | Status: SHIPPED | OUTPATIENT
Start: 2019-07-22 | End: 2019-08-21

## 2019-07-19 RX ORDER — FENTANYL 100 UG/1
1 PATCH TRANSDERMAL
Qty: 15 PATCH | Refills: 0 | Status: SHIPPED | OUTPATIENT
Start: 2019-07-22 | End: 2019-08-21

## 2019-07-25 DIAGNOSIS — J44.1 OBSTRUCTIVE CHRONIC BRONCHITIS WITH EXACERBATION (H): ICD-10-CM

## 2019-07-25 RX ORDER — ALBUTEROL SULFATE 90 UG/1
2 AEROSOL, METERED RESPIRATORY (INHALATION) EVERY 6 HOURS PRN
Qty: 1 INHALER | Refills: 11 | Status: SHIPPED | OUTPATIENT
Start: 2019-07-25 | End: 2020-02-20

## 2019-07-25 NOTE — TELEPHONE ENCOUNTER
Medication:      albuterol (PROAIR HFA/PROVENTIL HFA/VENTOLIN HFA) 108 (90 Base) MCG/ACT Inhaler 1 Inhaler 11 6/25/2018  No   Sig - Route: Inhale 2 puffs into the lungs every 6 hours as needed for shortness of breath / dyspnea or wheezing - Inhalation     Sig: Inhale 2 puffs into the lungs Q6H PRN  Date last written: 06/25/2018  Dispensed amount: 1  Refills: 11  Date last dispensed: 05/15/19      Pt's last office visit: 02/21/19  Next scheduled office visit:  Unknown      Per the RN/LPN medication refill protocol, writer is  to refill this request. If able to refill, please call the pt to inform them the RX was sent to the pharmacy. If unable to refill, route this encounter to the prescribing physician for authorization or further instructions.

## 2019-08-02 ENCOUNTER — MYC MEDICAL ADVICE (OUTPATIENT)
Dept: PEDIATRICS | Facility: CLINIC | Age: 54
End: 2019-08-02

## 2019-08-02 DIAGNOSIS — Z12.11 SPECIAL SCREENING FOR MALIGNANT NEOPLASMS, COLON: ICD-10-CM

## 2019-08-02 DIAGNOSIS — Z87.891 PERSONAL HISTORY OF TOBACCO USE, PRESENTING HAZARDS TO HEALTH: Primary | ICD-10-CM

## 2019-08-21 ENCOUNTER — MYC MEDICAL ADVICE (OUTPATIENT)
Dept: PEDIATRICS | Facility: CLINIC | Age: 54
End: 2019-08-21

## 2019-08-21 ENCOUNTER — MYC REFILL (OUTPATIENT)
Dept: PEDIATRICS | Facility: CLINIC | Age: 54
End: 2019-08-21

## 2019-08-21 DIAGNOSIS — M54.2 CHRONIC NECK PAIN: ICD-10-CM

## 2019-08-21 DIAGNOSIS — Z79.891 LONG TERM CURRENT USE OF OPIATE ANALGESIC: ICD-10-CM

## 2019-08-21 DIAGNOSIS — M43.22 FUSION OF SPINE OF CERVICAL REGION: ICD-10-CM

## 2019-08-21 DIAGNOSIS — Z12.11 SPECIAL SCREENING FOR MALIGNANT NEOPLASMS, COLON: Primary | ICD-10-CM

## 2019-08-21 DIAGNOSIS — G89.29 CHRONIC NECK PAIN: ICD-10-CM

## 2019-08-21 DIAGNOSIS — Z98.1 STATUS POST CERVICAL SPINAL ARTHRODESIS: ICD-10-CM

## 2019-08-21 NOTE — TELEPHONE ENCOUNTER
Fentanyl      Last Written Prescription Date:  7/22  Last Fill Quantity: 15,   # refills: 0     Norco      Last Written Prescription Date:  7/22  Last Fill Quantity: 180,   # refills: 0  Last Office Visit: 6/20/19  Future Office visit:       Routing refill request to provider for review/approval because:  Drug not on the [facility], P or [facility] refill protocol or controlled substance

## 2019-08-22 RX ORDER — HYDROCODONE BITARTRATE AND ACETAMINOPHEN 10; 325 MG/1; MG/1
1 TABLET ORAL EVERY 4 HOURS PRN
Qty: 180 TABLET | Refills: 0 | Status: SHIPPED | OUTPATIENT
Start: 2019-08-22 | End: 2019-09-18

## 2019-08-22 RX ORDER — FENTANYL 100 UG/1
1 PATCH TRANSDERMAL
Qty: 15 PATCH | Refills: 0 | Status: SHIPPED | OUTPATIENT
Start: 2019-08-22 | End: 2019-09-18

## 2019-09-06 ENCOUNTER — ANCILLARY PROCEDURE (OUTPATIENT)
Dept: CT IMAGING | Facility: CLINIC | Age: 54
End: 2019-09-06
Attending: INTERNAL MEDICINE
Payer: COMMERCIAL

## 2019-09-06 ENCOUNTER — ANCILLARY PROCEDURE (OUTPATIENT)
Dept: MAMMOGRAPHY | Facility: CLINIC | Age: 54
End: 2019-09-06
Attending: INTERNAL MEDICINE
Payer: COMMERCIAL

## 2019-09-06 DIAGNOSIS — Z87.891 PERSONAL HISTORY OF TOBACCO USE, PRESENTING HAZARDS TO HEALTH: ICD-10-CM

## 2019-09-06 DIAGNOSIS — Z12.31 ENCOUNTER FOR SCREENING MAMMOGRAM FOR BREAST CANCER: ICD-10-CM

## 2019-09-06 PROCEDURE — 77067 SCR MAMMO BI INCL CAD: CPT | Performed by: STUDENT IN AN ORGANIZED HEALTH CARE EDUCATION/TRAINING PROGRAM

## 2019-09-06 PROCEDURE — G0297 LDCT FOR LUNG CA SCREEN: HCPCS | Performed by: RADIOLOGY

## 2019-09-10 ENCOUNTER — TELEPHONE (OUTPATIENT)
Dept: PEDIATRICS | Facility: CLINIC | Age: 54
End: 2019-09-10

## 2019-09-18 ENCOUNTER — MYC REFILL (OUTPATIENT)
Dept: PEDIATRICS | Facility: CLINIC | Age: 54
End: 2019-09-18

## 2019-09-18 DIAGNOSIS — Z98.1 STATUS POST CERVICAL SPINAL ARTHRODESIS: ICD-10-CM

## 2019-09-18 DIAGNOSIS — G89.29 CHRONIC NECK PAIN: ICD-10-CM

## 2019-09-18 DIAGNOSIS — M54.2 CHRONIC NECK PAIN: ICD-10-CM

## 2019-09-18 DIAGNOSIS — Z79.891 LONG TERM CURRENT USE OF OPIATE ANALGESIC: ICD-10-CM

## 2019-09-18 DIAGNOSIS — M43.22 FUSION OF SPINE OF CERVICAL REGION: ICD-10-CM

## 2019-09-18 NOTE — TELEPHONE ENCOUNTER
Fentanyl      Last Written Prescription Date:  8/22  Last Fill Quantity: 15,   # refills: 0    Norco      Last Written Prescription Date:  8/22  Last Fill Quantity: 180,   # refills: 0  Last Office Visit: 6/20  Future Office visit: none      Routing refill request to provider for review/approval because:  Drug not on the [facility], P or [facility] refill protocol or controlled substance

## 2019-09-19 RX ORDER — FENTANYL 100 UG/1
1 PATCH TRANSDERMAL
Qty: 15 PATCH | Refills: 0 | Status: SHIPPED | OUTPATIENT
Start: 2019-09-25 | End: 2019-10-21

## 2019-09-19 RX ORDER — HYDROCODONE BITARTRATE AND ACETAMINOPHEN 10; 325 MG/1; MG/1
1 TABLET ORAL EVERY 4 HOURS PRN
Qty: 180 TABLET | Refills: 0 | Status: SHIPPED | OUTPATIENT
Start: 2019-09-24 | End: 2019-10-21

## 2019-09-19 NOTE — TELEPHONE ENCOUNTER
Fill Date ID Written Drug Qty Days Prescriber Rx # Pharmacy Refill Daily Dose * Pymt Type    08/26/2019  1  08/22/2019  Fentanyl 100 Mcg/hr Patch  15.00 30 [facility]  [id number]  [facility] ([id number][place]  0[place]   08/25/2019  1  08/22/2019  Hydrocodone-Acetamin  Mg  180.00 30 [facility]  [id number]  [facility] ([id number])  0[place]   07/26/2019  1  07/18/2019  Fentanyl 100 Mcg/hr Patch  15.00 30 [facility]  [id number]  [facility] ([id number])  [place]   07/24/2019  1  07/18/2019  Hydrocodone-Acetamin  Mg  180.00 30 [facility]  [id number]  [facility] ([id number])  0[place] 60.[place]     rx approved.

## 2019-09-25 ENCOUNTER — TELEPHONE (OUTPATIENT)
Dept: PEDIATRICS | Facility: CLINIC | Age: 54
End: 2019-09-25

## 2019-09-25 NOTE — TELEPHONE ENCOUNTER
[facility] Call Center    Phone Message    May a detailed message be left on voicemail: yes    Reason for Call: Other: Patient states medication refill request for HYDROcodone-acetaminophen and fentanyl patch need to be written prescription unless something has changed and the e-scribe is acceptable. Anne ocampo like call back to know if e scribe is acceptable by pharmacy or if she needs to come  written prescription. Please advise      Action Taken: Message routed to:  Primary Care p [phone]

## 2019-09-26 NOTE — RESULT ENCOUNTER NOTE
Dear Jaimee,   Your recent lab results showed the following:  -- lung nodules are stable. Continue annual screening.  -- you have emphysema changes and coronary calcium as demonstrated before.  -- please make an effort to quit smoking.     Please call or Mychart to our office if you have further questions.     Maki Topete MD-PhD

## 2019-09-26 NOTE — TELEPHONE ENCOUNTER
Left a detailed VM that e-prescribe changed and the pharmacy should have the scripts and to call back PRN.  Yesenia Corral RN

## 2019-09-28 ENCOUNTER — HEALTH MAINTENANCE LETTER (OUTPATIENT)
Age: 54
End: 2019-09-28

## 2019-10-17 DIAGNOSIS — M62.838 MUSCLE SPASM: ICD-10-CM

## 2019-10-17 NOTE — TELEPHONE ENCOUNTER
Tizanidine  Last Written Prescription Date:  9-21-18  Last Fill Quantity: 90,  # refills: 3   Last office visit: 6/20/2019 with prescribing provider:  dimas   Future Office Visit:

## 2019-10-21 ENCOUNTER — MYC REFILL (OUTPATIENT)
Dept: PEDIATRICS | Facility: CLINIC | Age: 54
End: 2019-10-21

## 2019-10-21 DIAGNOSIS — G89.29 CHRONIC NECK PAIN: ICD-10-CM

## 2019-10-21 DIAGNOSIS — M54.2 CHRONIC NECK PAIN: ICD-10-CM

## 2019-10-21 DIAGNOSIS — Z79.891 LONG TERM CURRENT USE OF OPIATE ANALGESIC: ICD-10-CM

## 2019-10-21 DIAGNOSIS — M43.22 FUSION OF SPINE OF CERVICAL REGION: ICD-10-CM

## 2019-10-21 DIAGNOSIS — Z98.1 STATUS POST CERVICAL SPINAL ARTHRODESIS: ICD-10-CM

## 2019-10-22 RX ORDER — FENTANYL 100 UG/1
1 PATCH TRANSDERMAL
Qty: 15 PATCH | Refills: 0 | Status: SHIPPED | OUTPATIENT
Start: 2019-10-27 | End: 2019-11-22

## 2019-10-22 RX ORDER — HYDROCODONE BITARTRATE AND ACETAMINOPHEN 10; 325 MG/1; MG/1
1 TABLET ORAL EVERY 4 HOURS PRN
Qty: 180 TABLET | Refills: 0 | Status: SHIPPED | OUTPATIENT
Start: 2019-10-25 | End: 2019-11-22

## 2019-10-22 NOTE — TELEPHONE ENCOUNTER
09/27/2019  1  09/19/2019  Fentanyl 100 Mcg/hr Patch  15.00 30 Tara Topete  [id number]  [place] (1004[place]  [place]   09/25/2019  1  09/19/2019  Hydrocodone-Acetamin  Mg  180.00 30 Tara Topete  [id number]  [place] ([id number])  0[place]       Rx approved.

## 2019-10-22 NOTE — TELEPHONE ENCOUNTER
fentaNYL (DURAGESIC) 100 mcg/hr 72 hr patch  Last Written Prescription Date:  9/25/19  Last Fill Quantity: 15patch,  # refills: 0     HYDROcodone-acetaminophen (NORCO)  MG per tablet  Last Written Prescription Date:  9/24/19  Last Fill Quantity: 180,  # refills: 0   Last office visit: 6/20/2019 with prescribing provider:  Maki Topete   Future Office Visit:      Routing refill request to provider for review/approval because:  Drug not on the [facility], [facility] or [facility] refill protocol or controlled substance

## 2019-10-23 ENCOUNTER — TELEPHONE (OUTPATIENT)
Dept: PEDIATRICS | Facility: CLINIC | Age: 54
End: 2019-10-23

## 2019-10-23 NOTE — TELEPHONE ENCOUNTER
[facility] Call Center    Phone Message    May a detailed message be left on voicemail: yes    Reason for Call: Other: Wanting to let Dr know that patient had tetanus, flu, and shingles shots done with [facility] [address] MG, 10/21/19     Action Taken: Message routed to:  Primary Care p [phone]

## 2019-10-24 PROCEDURE — 82274 ASSAY TEST FOR BLOOD FECAL: CPT | Performed by: INTERNAL MEDICINE

## 2019-10-25 DIAGNOSIS — Z12.11 SPECIAL SCREENING FOR MALIGNANT NEOPLASMS, COLON: ICD-10-CM

## 2019-10-25 DIAGNOSIS — Z99.81 O2 DEPENDENT: ICD-10-CM

## 2019-10-25 DIAGNOSIS — R09.02 HYPOXIA: ICD-10-CM

## 2019-10-25 DIAGNOSIS — R19.5 POSITIVE FIT (FECAL IMMUNOCHEMICAL TEST): Primary | ICD-10-CM

## 2019-10-25 DIAGNOSIS — J44.9 COPD, SEVERE (H): ICD-10-CM

## 2019-10-25 LAB — HEMOCCULT STL QL IA: POSITIVE

## 2019-10-25 NOTE — RESULT ENCOUNTER NOTE
Dear Jaimee,   Your recent lab results showed the following:  -- stool test is positive. Colonoscopy is recommended.   -- given your lung condition, it is best to be done in a hospital setting. I put in a referral for you to call Count includes the [facility] [phone] and request [facility] outpatient center for this.     Please call or Mychart to our office if you have further questions.     Maki Topete MD-PhD Report received from Dana PARIS.

## 2020-02-06 DIAGNOSIS — J43.9 PULMONARY EMPHYSEMA, UNSPECIFIED EMPHYSEMA TYPE (H): ICD-10-CM

## 2020-02-06 NOTE — TELEPHONE ENCOUNTER
Writer received a refill request from: Steve in [facility].     Medication:      mometasone-formoterol (DULERA) 200-5 MCG/ACT inhaler 1 Inhaler 11 2/21/2019  No   Sig - Route: Inhale 2 puffs into the lungs 2 times daily - Inhalation       Sig: Inhale 2 puffs into the lungs 2 times daily.  Date last written: 02/21/2019  Dispensed amount: 1  Refills: 11  Date last dispensed: 01/10/2020      Pt's last office visit: 02/21/2019  Next scheduled office visit: 02/20/2020      Per the RN/LPN medication refill protocol, writer is unable to refill this request. If able to refill, please call the pt to inform them the RX was sent to the pharmacy. If unable to refill, route this encounter to the prescribing physician for authorization or further instructions.

## 2020-02-11 ENCOUNTER — TELEPHONE (OUTPATIENT)
Dept: PEDIATRICS | Facility: CLINIC | Age: 55
End: 2020-02-11

## 2020-02-11 NOTE — TELEPHONE ENCOUNTER
PA requesting authorization for the following medication.     Fentanyl/Patch Td72    Sky Briggs, [facility]

## 2020-02-12 ENCOUNTER — MYC MEDICAL ADVICE (OUTPATIENT)
Dept: PEDIATRICS | Facility: CLINIC | Age: 55
End: 2020-02-12

## 2020-02-12 NOTE — TELEPHONE ENCOUNTER
Prior Authorization Approval via phone    Authorization Effective Date: 1/14/2020  Authorization Expiration Date: 2/12/2021  Medication: FENTANYL / PATCH TD72-APPROVED  Approved Dose/Quantity:   Reference #:     Insurance Company: [employer] - Phone [phone] Fax [phone]  Expected CoPay:       CoPay Card Available:      Foundation Assistance Needed:    Which Pharmacy is filling the prescription (Not needed for infusion/clinic administered): [facility] - [address]  Pharmacy Notified: Yes  Patient Notified: No    Case # [id number]. Per insurance rep, test claim was done to make sure it processed ok and it did. Now pharmacy just need to call their help desk [phone] for an override. Notified pharmacy.

## 2020-02-14 ENCOUNTER — MYC MEDICAL ADVICE (OUTPATIENT)
Dept: PEDIATRICS | Facility: CLINIC | Age: 55
End: 2020-02-14

## 2020-02-14 ENCOUNTER — TELEPHONE (OUTPATIENT)
Dept: PEDIATRICS | Facility: CLINIC | Age: 55
End: 2020-02-14

## 2020-02-14 DIAGNOSIS — M43.22 FUSION OF SPINE OF CERVICAL REGION: ICD-10-CM

## 2020-02-14 DIAGNOSIS — G89.29 CHRONIC NECK PAIN: ICD-10-CM

## 2020-02-14 DIAGNOSIS — Z79.891 LONG TERM CURRENT USE OF OPIATE ANALGESIC: ICD-10-CM

## 2020-02-14 DIAGNOSIS — M54.2 CHRONIC NECK PAIN: ICD-10-CM

## 2020-02-14 RX ORDER — FENTANYL 100 UG/1
1 PATCH TRANSDERMAL
Qty: 10 PATCH | Refills: 0 | Status: SHIPPED | OUTPATIENT
Start: 2020-02-24 | End: 2020-03-20

## 2020-02-14 NOTE — TELEPHONE ENCOUNTER
Spoke with patient, relayed the insurance coverage of maximum 400 milligrams morphine equivalent.  Our options are changing fentanyl to every 72 hours, or reducing Norco to 4 tablets a day.  Patient opted to changing the fentanyl patch to every 72 hours.  She will not need prior authorization for the new dose.

## 2020-02-14 NOTE — TELEPHONE ENCOUNTER
Fentanyl PA was approved per phone encounter on 2/11/2020. Relay message to [employer] to fill the RX as Q48 hours, not q72 hours.

## 2020-02-14 NOTE — TELEPHONE ENCOUNTER
[facility] says morphine equivalent is approved up to 400mg, but with the norco and fentanyl every 48 hours she'd get 420meq. We'd need to ask again for prior auth or change script.  Yesenia Corral RN

## 2020-02-14 NOTE — TELEPHONE ENCOUNTER
[facility] Call Center    Phone Message    May a detailed message be left on voicemail: no     Reason for Call: Medication Question or concern regarding medication   Prescription Clarification  Name of Medication: Fentanyl patch  Prescribing Provider: Efrem   Pharmacy:      [facility] - [place], [place] - [address] [place] [address]     What on the order needs clarification? Dr Topete has script reading to change every 48 hours.  Per medicare standards script should ready change every 72 hours.  If that can't be changed then they are asking for the equivalence to be changed. [facility] asking for a call back.           Action Taken: Message routed to:  Primary Care p [phone]    Travel Screening:

## 2020-02-19 ENCOUNTER — MYC MEDICAL ADVICE (OUTPATIENT)
Dept: PEDIATRICS | Facility: CLINIC | Age: 55
End: 2020-02-19

## 2020-02-20 ENCOUNTER — OFFICE VISIT (OUTPATIENT)
Dept: PULMONOLOGY | Facility: CLINIC | Age: 55
End: 2020-02-20
Payer: COMMERCIAL

## 2020-02-20 ENCOUNTER — OFFICE VISIT (OUTPATIENT)
Dept: NURSING | Facility: CLINIC | Age: 55
End: 2020-02-20
Payer: COMMERCIAL

## 2020-02-20 VITALS
HEIGHT: 63 IN | WEIGHT: 181.5 LBS | SYSTOLIC BLOOD PRESSURE: 128 MMHG | HEART RATE: 82 BPM | DIASTOLIC BLOOD PRESSURE: 77 MMHG | OXYGEN SATURATION: 91 % | BODY MASS INDEX: 32.16 KG/M2

## 2020-02-20 DIAGNOSIS — J44.1 OBSTRUCTIVE CHRONIC BRONCHITIS WITH EXACERBATION (H): ICD-10-CM

## 2020-02-20 DIAGNOSIS — J44.9 CHRONIC OBSTRUCTIVE PULMONARY DISEASE, UNSPECIFIED COPD TYPE (H): ICD-10-CM

## 2020-02-20 DIAGNOSIS — J43.9 PULMONARY EMPHYSEMA, UNSPECIFIED EMPHYSEMA TYPE (H): ICD-10-CM

## 2020-02-20 LAB
6 MIN WALK (FT): 1280 FT
6 MIN WALK (M): 390 M

## 2020-02-20 PROCEDURE — 94618 PULMONARY STRESS TESTING: CPT | Performed by: INTERNAL MEDICINE

## 2020-02-20 PROCEDURE — 94729 DIFFUSING CAPACITY: CPT | Performed by: INTERNAL MEDICINE

## 2020-02-20 PROCEDURE — 99213 OFFICE O/P EST LOW 20 MIN: CPT | Mod: 25 | Performed by: INTERNAL MEDICINE

## 2020-02-20 PROCEDURE — 94375 RESPIRATORY FLOW VOLUME LOOP: CPT | Performed by: INTERNAL MEDICINE

## 2020-02-20 RX ORDER — ALBUTEROL SULFATE 90 UG/1
2 AEROSOL, METERED RESPIRATORY (INHALATION) EVERY 6 HOURS PRN
Qty: 1 INHALER | Refills: 11 | Status: SHIPPED | OUTPATIENT
Start: 2020-02-20 | End: 2020-12-15

## 2020-02-20 ASSESSMENT — MIFFLIN-ST. JEOR: SCORE: 1387.41

## 2020-02-20 ASSESSMENT — PAIN SCALES - GENERAL: PAINLEVEL: MILD PAIN (3)

## 2020-02-20 NOTE — NURSING NOTE
"Jaimee Rodriguez's goals for this visit include:   She requests these members of her care team be copied on today's visit information:     PCP: Maki Topete    Referring Provider:  No referring provider defined for this encounter.    /77   Pulse 82   Ht 1.6 m (5' 3\")   Wt 82.3 kg (181 lb 8 oz)   SpO2 91%   BMI 32.15 kg/m      "

## 2020-02-20 NOTE — LETTER
2/20/2020        RE: Jaimee Rodriguez  [address] [facility] [address]        Reason for Visit  Jaimee Rodriguez is a 54 year old year old female who is being seen for RECHECK (Annual follow up for COPD w/loop DLCO and oxygen titration study)    ILD HPI    Jaimee Rodriguez is a 54-year-old female who follows up today for her annual visit for COPD.  She has a history of COPD with significant emphysema on imaging and ongoing use smoking.  She returns clinic today overall feeling she was stable she has some ups and downs and has been more congested over the past 2 or 3 weeks.  Does feel that that made the breathing test little more difficult.  Otherwise she generally feels stable she continues to use her Dulera and Spiriva daily and her albuterol inhaler 2-4 times per day generally.  Otherwise she is doing well she does note some occasional right-sided pain at the costal margin and this is intermittent and not associated with exertion.  Otherwise she generally feels well with no other new complaints.      Current Outpatient Medications   Medication     albuterol 108 (90 Base) MCG/ACT IN inhaler     diclofenac (VOLTAREN) 1 % topical gel     [START ON 2/24/2020] fentaNYL (DURAGESIC) 100 mcg/hr 72 hr patch     [START ON 2/24/2020] HYDROcodone-acetaminophen  MG PO per tablet     mometasone-formoterol 200-5 MCG/ACT IN inhaler     naloxone (EVZIO) 0.4 MG/0.4ML auto-injector     Naloxone HCl (EVZIO) 2 MG/0.4ML SOAJ     order for DME     ORDER FOR DME     tiotropium 2.5 MCG/ACT IN inhaler     tiZANidine (ZANAFLEX) 4 MG tablet     traZODone (DESYREL) 100 MG tablet     No current facility-administered medications for this visit.      Allergies   Allergen Reactions     Morphine      Past Medical History:   Diagnosis Date     Cervical spondylosis without myelopathy     With radiculopathy.     Contact dermatitis and other eczema, due to unspecified cause     eczema     Endometriosis, site unspecified     R uterosacral  ligament/surgically removed     Irregular menstrual cycle      NONSPECIFIC MEDICAL HISTORY     Mood swings, Irritability     Other acne      Other and unspecified ovarian cyst     sydni R     Tobacco use disorder 2001       Past Surgical History:   Procedure Laterality Date     C APPENDECTOMY       C  DELIVERY ONLY  ,,    , Low Cervical     C LIGATE FALLOPIAN TUBE      S/P tubal ligation     C TOTAL ABDOM HYSTERECTOMY      not total     pt did not have ovaries removed     HC ENLARGE BREAST WITH IMPLANT       SURGICAL HISTORY OF -   10/18/2005    C6 corpectomy with iliac crest autograft fixation. [facility]       Social History     Socioeconomic History     Marital status:      Spouse name: Not on file     Number of children: Not on file     Years of education: Not on file     Highest education level: Not on file   Occupational History     Not on file   Social Needs     Financial resource strain: Not on file     Food insecurity:     Worry: Not on file     Inability: Not on file     Transportation needs:     Medical: Not on file     Non-medical: Not on file   Tobacco Use     Smoking status: Current Every Day Smoker     Packs/day: 0.75     Years: 40.00     Pack years: 30.00     Types: Cigarettes     Smokeless tobacco: Never Used     Tobacco comment: started age 14.   Substance and Sexual Activity     Alcohol use: Yes     Alcohol/week: 0.0 standard drinks     Comment: occ     Drug use: No     Sexual activity: Yes     Partners: Male     Birth control/protection: Surgical   Lifestyle     Physical activity:     Days per week: Not on file     Minutes per session: Not on file     Stress: Not on file   Relationships     Social connections:     Talks on phone: Not on file     Gets together: Not on file     Attends [employer] service: Not on file     Active member of club or organization: Not on file     Attends meetings of clubs or organizations: Not on file      "Relationship status: Not on file     Intimate partner violence:     Fear of current or ex partner: Not on file     Emotionally abused: Not on file     Physically abused: Not on file     Forced sexual activity: Not on file   Other Topics Concern      Service No     Blood Transfusions No     Caffeine Concern No     Occupational Exposure No     Hobby Hazards No     Sleep Concern Yes     Stress Concern No     Weight Concern No     Special Diet No     Comment: not a milk drinker     Back Care No     Exercise No     Comment: walks at work     Bike Helmet Not Asked     Seat Belt Yes     Self-Exams No     Parent/sibling w/ CABG, MI or angioplasty before 65F 55M? Not Asked   Social History Narrative     Not on file       Family History   Problem Relation Age of Onset     Cerebrovascular Disease Mother      Chronic Obstructive Pulmonary Disease Mother      Arthritis Father      Diabetes Maternal Grandmother      Cancer Maternal Aunt         breast       ROS Pulmonary    A complete ROS was otherwise negative except as noted in the HPI.  Vitals:    02/20/20 1440   BP: 128/77   Pulse: 82   SpO2: 91%   Weight: 82.3 kg (181 lb 8 oz)   Height: 1.6 m (5' 3\")     Exam:   GENERAL APPEARANCE: Well developed, well nourished, alert, and in no apparent distress  NECK: supple, no masses, no thyromegaly.  LYMPHATICS: No significant axillary, cervical, or supraclavicular nodes.  RESP: good air flow throughout, - decreased BS, no wheezes or crackles  CV: Normal S1, S2, regular rhythm, normal rate, no rub, no murmur,  no gallop, no LE edema.   ABDOMEN:  Bowel sounds normal, soft, nontender, no HSM or masses.   MS: extremities normal- no clubbing, no cyanosis.  PSYCH: mentation appears normal. and affect normal/bright  Results: I have reviewed all imaging, PFTs and other relavent tests, please see below for details, PFT and imaging results were reviewed with the patient.  PFTs: Severe obstruction with moderate reduction in diffusing " capacity.  There is been a slight drop in the FEV1 compared to previous.    Assessment and plan:    54-year-old female with COPD.  Slight drop in her FEV1 however symptomatically she is stable and has been having a little bit of some congestion recently so that likely the reason for the drop in her PFTs.  We will have her continue her current regimen and continue to follow her on an annual basis I have told her she does feel her breathing gets worse she should call and we can evaluate her.  We again to some cussed the importance of smoking cessation she states she has quit twice since the last visit however she has relapsed both times and is still smoking about 1/2 pack/day however she remains motivated and will continue to work on this.      CBC   Recent Labs   Lab Test 04/08/19  1017 07/05/18  1733   RBC 3.79* 4.79   HGB 11.2* 14.1   HCT 33.7* 43.8    284       Basic Metabolic Panel  Recent Labs   Lab Test 06/20/19  0809 04/08/19  1017    135   POTASSIUM 4.2 3.8   CHLORIDE 101 99   CO2 32 30   BUN 19 27   GLC 99 100*   JOSEPH 9.4 8.8       INR  No results for input(s): INR in the last 02553 hours.    PFT  PFT Latest Ref Rng & Units 2/20/2020   FVC L 2.11   FEV1 L 0.73   FVC% % 67   FEV1% % 29           CC:          Sincerely,        David Morris Perlman, MD

## 2020-02-20 NOTE — TELEPHONE ENCOUNTER
Called [facility] and canceled the Rx. Informed patient via [email or web address] Application.    Tennille Quiroga RN, [facility]

## 2020-02-20 NOTE — PROGRESS NOTES
Reason for Visit  Jaimee Rodriguez is a 54 year old year old female who is being seen for RECHECK (Annual follow up for COPD w/loop DLCO and oxygen titration study)    ILD HPI    Jaimee Rodriguez is a 54-year-old female who follows up today for her annual visit for COPD.  She has a history of COPD with significant emphysema on imaging and ongoing use smoking.  She returns clinic today overall feeling she was stable she has some ups and downs and has been more congested over the past 2 or 3 weeks.  Does feel that that made the breathing test little more difficult.  Otherwise she generally feels stable she continues to use her Dulera and Spiriva daily and her albuterol inhaler 2-4 times per day generally.  Otherwise she is doing well she does note some occasional right-sided pain at the costal margin and this is intermittent and not associated with exertion.  Otherwise she generally feels well with no other new complaints.      Current Outpatient Medications   Medication     albuterol 108 (90 Base) MCG/ACT IN inhaler     diclofenac (VOLTAREN) 1 % topical gel     [START ON 2/24/2020] fentaNYL (DURAGESIC) 100 mcg/hr 72 hr patch     [START ON 2/24/2020] HYDROcodone-acetaminophen  MG PO per tablet     mometasone-formoterol 200-5 MCG/ACT IN inhaler     naloxone (EVZIO) 0.4 MG/0.4ML auto-injector     Naloxone HCl (EVZIO) 2 MG/0.4ML SOAJ     order for DME     ORDER FOR DME     tiotropium 2.5 MCG/ACT IN inhaler     tiZANidine (ZANAFLEX) 4 MG tablet     traZODone (DESYREL) 100 MG tablet     No current facility-administered medications for this visit.      Allergies   Allergen Reactions     Morphine      Past Medical History:   Diagnosis Date     Cervical spondylosis without myelopathy     With radiculopathy.     Contact dermatitis and other eczema, due to unspecified cause     eczema     Endometriosis, site unspecified     R uterosacral ligament/surgically removed     Irregular menstrual cycle      NONSPECIFIC MEDICAL HISTORY      Mood swings, Irritability     Other acne      Other and unspecified ovarian cyst     sydni R     Tobacco use disorder 2001       Past Surgical History:   Procedure Laterality Date     C APPENDECTOMY       C  DELIVERY ONLY  ,,    , Low Cervical     C LIGATE FALLOPIAN TUBE      S/P tubal ligation     C TOTAL ABDOM HYSTERECTOMY      not total     pt did not have ovaries removed     HC ENLARGE BREAST WITH IMPLANT       SURGICAL HISTORY OF -   10/18/2005    C6 corpectomy with iliac crest autograft fixation. [facility]       Social History     Socioeconomic History     Marital status:      Spouse name: Not on file     Number of children: Not on file     Years of education: Not on file     Highest education level: Not on file   Occupational History     Not on file   Social Needs     Financial resource strain: Not on file     Food insecurity:     Worry: Not on file     Inability: Not on file     Transportation needs:     Medical: Not on file     Non-medical: Not on file   Tobacco Use     Smoking status: Current Every Day Smoker     Packs/day: 0.75     Years: 40.00     Pack years: 30.00     Types: Cigarettes     Smokeless tobacco: Never Used     Tobacco comment: started age 14.   Substance and Sexual Activity     Alcohol use: Yes     Alcohol/week: 0.0 standard drinks     Comment: occ     Drug use: No     Sexual activity: Yes     Partners: Male     Birth control/protection: Surgical   Lifestyle     Physical activity:     Days per week: Not on file     Minutes per session: Not on file     Stress: Not on file   Relationships     Social connections:     Talks on phone: Not on file     Gets together: Not on file     Attends Zoroastrianism service: Not on file     Active member of club or organization: Not on file     Attends meetings of clubs or organizations: Not on file     Relationship status: Not on file     Intimate partner violence:     Fear of current or ex  "partner: Not on file     Emotionally abused: Not on file     Physically abused: Not on file     Forced sexual activity: Not on file   Other Topics Concern      Service No     Blood Transfusions No     Caffeine Concern No     Occupational Exposure No     Hobby Hazards No     Sleep Concern Yes     Stress Concern No     Weight Concern No     Special Diet No     Comment: not a milk drinker     Back Care No     Exercise No     Comment: walks at work     Bike Helmet Not Asked     Seat Belt Yes     Self-Exams No     Parent/sibling w/ CABG, MI or angioplasty before 65F 55M? Not Asked   Social History Narrative     Not on file       Family History   Problem Relation Age of Onset     Cerebrovascular Disease Mother      Chronic Obstructive Pulmonary Disease Mother      Arthritis Father      Diabetes Maternal Grandmother      Cancer Maternal Aunt         breast       ROS Pulmonary    A complete ROS was otherwise negative except as noted in the HPI.  Vitals:    02/20/20 1440   BP: 128/77   Pulse: 82   SpO2: 91%   Weight: 82.3 kg (181 lb 8 oz)   Height: 1.6 m (5' 3\")     Exam:   GENERAL APPEARANCE: Well developed, well nourished, alert, and in no apparent distress  NECK: supple, no masses, no thyromegaly.  LYMPHATICS: No significant axillary, cervical, or supraclavicular nodes.  RESP: good air flow throughout, - decreased BS, no wheezes or crackles  CV: Normal S1, S2, regular rhythm, normal rate, no rub, no murmur,  no gallop, no LE edema.   ABDOMEN:  Bowel sounds normal, soft, nontender, no HSM or masses.   MS: extremities normal- no clubbing, no cyanosis.  PSYCH: mentation appears normal. and affect normal/bright  Results: I have reviewed all imaging, PFTs and other relavent tests, please see below for details, PFT and imaging results were reviewed with the patient.  PFTs: Severe obstruction with moderate reduction in diffusing capacity.  There is been a slight drop in the FEV1 compared to previous.    Assessment and " plan:    54-year-old female with COPD.  Slight drop in her FEV1 however symptomatically she is stable and has been having a little bit of some congestion recently so that likely the reason for the drop in her PFTs.  We will have her continue her current regimen and continue to follow her on an annual basis I have told her she does feel her breathing gets worse she should call and we can evaluate her.  We again to some cussed the importance of smoking cessation she states she has quit twice since the last visit however she has relapsed both times and is still smoking about 1/2 pack/day however she remains motivated and will continue to work on this.      CBC   Recent Labs   Lab Test 04/08/19  1017 07/05/18  1733   RBC 3.79* 4.79   HGB 11.2* 14.1   HCT 33.7* 43.8    284       Basic Metabolic Panel  Recent Labs   Lab Test 06/20/19  0809 04/08/19  1017    135   POTASSIUM 4.2 3.8   CHLORIDE 101 99   CO2 32 30   BUN 19 27   GLC 99 100*   JOSEPH 9.4 8.8       INR  No results for input(s): INR in the last 34008 hours.    PFT  PFT Latest Ref Rng & Units 2/20/2020   FVC L 2.11   FEV1 L 0.73   FVC% % 67   FEV1% % 29           CC:

## 2020-02-21 ENCOUNTER — MYC MEDICAL ADVICE (OUTPATIENT)
Dept: PULMONOLOGY | Facility: CLINIC | Age: 55
End: 2020-02-21

## 2020-02-21 DIAGNOSIS — J43.9 PULMONARY EMPHYSEMA, UNSPECIFIED EMPHYSEMA TYPE (H): Primary | ICD-10-CM

## 2020-02-24 NOTE — TELEPHONE ENCOUNTER
I called patient and informed her based on her latest testing she does not qualify for oxygen with exertion. I reviewed her chart and the last time she qualified for this was 2015. Patient's testing was borderline as the lowest her sats got were 90%.     Patient states that she uses 2LPM at all times and really would like a portable oxygen concentrator. Will discuss with Dr. Perlman.     Héctor Mcghee RN   [facility] Care Coordinator  [facility]

## 2020-02-25 ENCOUNTER — TELEPHONE (OUTPATIENT)
Dept: PULMONOLOGY | Facility: CLINIC | Age: 55
End: 2020-02-25

## 2020-02-25 NOTE — TELEPHONE ENCOUNTER
Contacted patient regarding O2 concerns.    Per Dr. Perlman:    Perlman, David Morris, MD Wells, Alison M, RN 5 hours ago (8:11 AM)      Well I think the only way is to repeat the 6MWT and tell her to walk faster so she desats. I am fine repeating it if she wants to try.     DP      Informed patient of this information and pt would like to proceed w/ another 6 MWT. Patient has been scheduled for 6 MWT Monday 03/02/2020 @10am. Patient verbalized agreement to time/date of test and had no further questions.    Rodriguez Santos LPN    Rheumatology / Pulmonology  [facility]

## 2020-02-25 NOTE — TELEPHONE ENCOUNTER
Prior Authorization Retail Medication Request    Medication/Dose: Albuterol HFA 90mcg inhaler  ICD code (if different than what is on RX):    Previously Tried and Failed:    Rationale:      Insurance Name:    Insurance ID:        Pharmacy Information (if different than what is on RX)  [facility]:  [facility]  Phone:  [phone]

## 2020-02-27 NOTE — TELEPHONE ENCOUNTER
Prior Authorization Not Needed per Insurance    Medication: Albuterol HFA 90mcg inhaler-PA NOT NEEDED   Insurance Company: [employer] - Phone [phone] Fax [phone]  Expected CoPay: $3.90    Pharmacy Filling the Rx: [id number] [place] [address]  Pharmacy Notified: Yes  Patient Notified: No    Called pharmacy and pharmacy stated that PA is Not Needed and generic Albuterol is covered. Pharmacy stated that they have a paid claim on medication quantity 1 inhaler for 25 day supply on 2/20/20 and patent has picked up medication.

## 2020-03-02 ENCOUNTER — OFFICE VISIT (OUTPATIENT)
Dept: NURSING | Facility: CLINIC | Age: 55
End: 2020-03-02
Payer: COMMERCIAL

## 2020-03-02 DIAGNOSIS — J43.9 PULMONARY EMPHYSEMA, UNSPECIFIED EMPHYSEMA TYPE (H): ICD-10-CM

## 2020-03-02 DIAGNOSIS — J44.9 COPD, SEVERE (H): Primary | ICD-10-CM

## 2020-03-02 LAB
6 MIN WALK (FT): 1000 FT
6 MIN WALK (M): 305 M

## 2020-03-02 PROCEDURE — 94618 PULMONARY STRESS TESTING: CPT | Performed by: INTERNAL MEDICINE

## 2020-03-03 LAB — FIO2-PRE: 21 %

## 2020-03-10 LAB
DLCOUNC-%PRED-PRE: 51 %
DLCOUNC-PRE: 10.05 ML/MIN/MMHG
DLCOUNC-PRED: 19.6 ML/MIN/MMHG
ERV-%PRED-PRE: 105 %
ERV-PRE: 0.57 L
ERV-PRED: 0.54 L
EXPTIME-PRE: 10.29 SEC
FEF2575-%PRED-PRE: 9 %
FEF2575-PRE: 0.24 L/SEC
FEF2575-PRED: 2.42 L/SEC
FEFMAX-%PRED-PRE: 41 %
FEFMAX-PRE: 2.63 L/SEC
FEFMAX-PRED: 6.31 L/SEC
FEV1-%PRED-PRE: 29 %
FEV1-PRE: 0.73 L
FEV1FEV6-PRE: 42 %
FEV1FEV6-PRED: 82 %
FEV1FVC-PRE: 34 %
FEV1FVC-PRED: 80 %
FEV1SVC-PRE: 29 %
FEV1SVC-PRED: 79 %
FIFMAX-PRE: 2.23 L/SEC
FVC-%PRED-PRE: 67 %
FVC-PRE: 2.11 L
FVC-PRED: 3.14 L
IC-%PRED-PRE: 73 %
IC-PRE: 1.93 L
IC-PRED: 2.64 L
VA-%PRED-PRE: 90 %
VA-PRE: 4.1 L
VC-%PRED-PRE: 78 %
VC-PRE: 2.51 L
VC-PRED: 3.19 L

## 2020-03-20 ENCOUNTER — MYC REFILL (OUTPATIENT)
Dept: PEDIATRICS | Facility: CLINIC | Age: 55
End: 2020-03-20

## 2020-03-20 DIAGNOSIS — Z98.1 STATUS POST CERVICAL SPINAL ARTHRODESIS: ICD-10-CM

## 2020-03-20 DIAGNOSIS — M43.22 FUSION OF SPINE OF CERVICAL REGION: ICD-10-CM

## 2020-03-20 DIAGNOSIS — M54.2 CHRONIC NECK PAIN: ICD-10-CM

## 2020-03-20 DIAGNOSIS — Z79.891 LONG TERM CURRENT USE OF OPIATE ANALGESIC: ICD-10-CM

## 2020-03-20 DIAGNOSIS — G89.29 CHRONIC NECK PAIN: ICD-10-CM

## 2020-03-20 RX ORDER — FENTANYL 100 UG/1
1 PATCH TRANSDERMAL
Qty: 10 PATCH | Refills: 0 | Status: SHIPPED | OUTPATIENT
Start: 2020-03-20 | End: 2020-04-20

## 2020-03-20 RX ORDER — HYDROCODONE BITARTRATE AND ACETAMINOPHEN 10; 325 MG/1; MG/1
1 TABLET ORAL EVERY 4 HOURS PRN
Qty: 180 TABLET | Refills: 0 | Status: SHIPPED | OUTPATIENT
Start: 2020-03-20 | End: 2020-04-20

## 2020-03-20 NOTE — TELEPHONE ENCOUNTER
Norco      Last Written Prescription Date:  2/24  Last Fill Quantity: 180,   # refills: 0  Last Office Visit: 6/20/19  Future Office visit:       Routing refill request to provider for review/approval because:  Drug not on the [facility], P or [facility] refill protocol or controlled substance    Fentanyl      Last Written Prescription Date:  2/24  Last Fill Quantity: 10,   # refills: 0

## 2020-05-29 ENCOUNTER — TELEPHONE (OUTPATIENT)
Dept: PEDIATRICS | Facility: CLINIC | Age: 55
End: 2020-05-29

## 2020-05-29 NOTE — TELEPHONE ENCOUNTER
[facility] CLINICAL DOCUMENTATION    Form Documentation Form or Letter Request    Type or form/letter needing completion: [employer] - Medical Emergency Forms  Provider: Maki Topete  Has provider seen patient for office visit related to reason for form request? 6/20/2019  Date form needed: Not indicated  Once completed: Fax form to: [phone]. Mail original to patient.     Form placed on Dr. Topete's desk for review. Jessenia TRIANA, CMA

## 2020-06-09 ENCOUNTER — TELEPHONE (OUTPATIENT)
Dept: PULMONOLOGY | Facility: CLINIC | Age: 55
End: 2020-06-09

## 2020-06-09 NOTE — TELEPHONE ENCOUNTER
Received request from [facility] in [place] for an updated O2 Rx. Per paperwork patient wanting to switch to a different modality for portability.    Forms emailed to Dr. Perlman for review / signature.      [facility] [place] - [address] [place] [address]  P: [phone]   F: [phone]    Will fax completed forms to [employer] once received from Dr. Perlman.    Rodriguez Santos LPN    Rheumatology / Pulmonology  [facility]

## 2020-06-11 NOTE — TELEPHONE ENCOUNTER
Signed Rx for oxygen compressor has been faxed to [employer] @ [phone]. P: [phone]. Fax verified via [employer].    A signed copy has been sent to medical rec's to be uploaded into patient's chart.    Rodriguez Santos LPN    Rheumatology / Pulmonology  [facility]

## 2020-06-19 ENCOUNTER — VIRTUAL VISIT (OUTPATIENT)
Dept: PEDIATRICS | Facility: CLINIC | Age: 55
End: 2020-06-19
Payer: COMMERCIAL

## 2020-06-19 DIAGNOSIS — M54.2 CHRONIC NECK PAIN: Primary | ICD-10-CM

## 2020-06-19 DIAGNOSIS — Z86.79 HISTORY OF HYPERTENSION: ICD-10-CM

## 2020-06-19 DIAGNOSIS — G89.29 CHRONIC NECK PAIN: Primary | ICD-10-CM

## 2020-06-19 DIAGNOSIS — Z99.81 O2 DEPENDENT: ICD-10-CM

## 2020-06-19 DIAGNOSIS — J44.9 COPD, SEVERE (H): ICD-10-CM

## 2020-06-19 DIAGNOSIS — Z79.891 LONG TERM PRESCRIPTION OPIATE USE: ICD-10-CM

## 2020-06-19 DIAGNOSIS — Z13.6 CARDIOVASCULAR SCREENING; LDL GOAL LESS THAN 160: ICD-10-CM

## 2020-06-19 PROCEDURE — 99214 OFFICE O/P EST MOD 30 MIN: CPT | Mod: 95 | Performed by: INTERNAL MEDICINE

## 2020-06-19 ASSESSMENT — PATIENT HEALTH QUESTIONNAIRE - PHQ9: SUM OF ALL RESPONSES TO PHQ QUESTIONS 1-9: 11

## 2020-06-19 NOTE — PROGRESS NOTES
"Jaimee Rodriguez is a 54 year old female who is being evaluated via a billable video visit.      The patient has been notified of following:     \"This video visit will be conducted via a call between you and your physician/provider. We have found that certain health care needs can be provided without the need for an in-person physical exam.  This service lets us provide the care you need with a video conversation.  If a prescription is necessary we can send it directly to your pharmacy.  If lab work is needed we can place an order for that and you can then stop by our lab to have the test done at a later time.    Video visits are billed at different rates depending on your insurance coverage.  Please reach out to your insurance provider with any questions.    If during the course of the call the physician/provider feels a video visit is not appropriate, you will not be charged for this service.\"    Patient has given verbal consent for Video visit? Yes    Will anyone else be joining your video visit? No    Subjective     Jaimee Rodriguez is a 54 year old female who presents today via video visit for the following health issues:    HPI  Hypertension Follow-up    Do you check your blood pressure regularly outside of the clinic? Yes     Are you following a low salt diet? Yes    Are your blood pressures ever more than 140 on the top number (systolic) OR more   than 90 on the bottom number (diastolic), for example 140/90? Yes  Lisinopril was discontinued a year ago  COPD Follow-Up    Overall, how are your COPD symptoms since your last clinic visit?  Slightly worse    How much fatigue or shortness of breath do you have when you are walking?  More than usual    How much shortness of breath do you have when you are resting?  Same as usual    How often do you cough? Often    Have you noticed any change in your sputum/phlegm?  No    Have you experienced a recent fever? No    Please describe how far you can walk without stopping to " rest:  Less than 1 block    How many flights of stairs are you  able to walk up without stopping?  1  Have you had any Emergency Room Visits, Urgent Care Visits, or Hospital Admissions because of your COPD since your last office visit?  No    Patient sees allergist for COPD, oxygen dependent  History of hypertension  History   Smoking Status     Current Every Day Smoker     Packs/day: 0.75     Years: 40.00     Types: Cigarettes   Smokeless Tobacco     Never Used     Comment: started age 14.     No results found for: FEV1, [id number]      How many servings of fruits and vegetables do you eat daily?  2-3    On average, how many sweetened beverages do you drink each day (Examples: soda, juice, sweet tea, etc.  Do NOT count diet or artificially sweetened beverages)?   0    How many days per week do you exercise enough to make your heart beat faster? 3 or less    How many minutes a day do you exercise enough to make your heart beat faster? 9 or less    How many days per week do you miss taking your medication? 0       This visit primarily to review long-term narcotic use.  Patient has a history of chronic neck pain, failed conservative treatment.  She was treated by, failed intrathecal pump.  She has been maintained on fentanyl patch and hydrocodone/acetaminophen  mg for a number of years.  Recently her fentanyl patch was changed from 48hours to 72 hours.  Hydrocodone is maintained at 6 tablets a day.  Patient reported that she had a hard time going down on fentanyl patch stretching out of the 72 hours.  At this time she is not ready to wean hydrocodone.    Patient has a history of severe COPD, oxygen dependent.  Previously I had recommended having Narcan on hand.  Patient did not want the nailbed and wanted a injection Ab0.  Her insurance is not covering this.  We discussed this again.  Patient is now open to get the nasal spray    ROS:  Constitutional, HEENT, cardiovascular, pulmonary, gi and gu systems are  negative, except as otherwise noted.       albuterol 108 (90 Base) MCG/ACT IN inhaler, Inhale 2 puffs into the lungs every 6 hours as needed for shortness of breath / dyspnea or wheezing  diclofenac (VOLTAREN) 1 % topical gel, Place 2 g onto the skin 4 times daily  fentaNYL (DURAGESIC) 100 mcg/hr 72 hr patch, Place 1 patch onto the skin every 72 hours (Mylan brand)  HYDROcodone-acetaminophen (NORCO)  MG per tablet, Take 1 tablet by mouth every 4 hours as needed for pain (For 1 time increase only)  mometasone-formoterol 200-5 MCG/ACT IN inhaler, Inhale 2 puffs into the lungs 2 times daily  naloxone (EVZIO) 0.4 MG/0.4ML auto-injector, Inject 0.4 mLs (0.4 mg) into the muscle as needed for opioid reversal every 2-3 minutes until assistance arrives  order for DME, Equipment being ordered: Nebulizer  ORDER FOR DME, Equipment being ordered: Oxygen 4 lpm via nasal cannula continuous flow with any exertion and 2 lpm continuous flow with nasal cannula at rest and at night. Provide portability.  Length of need 99.  tiZANidine (ZANAFLEX) 4 MG tablet, Take 1 tablet (4 mg) by mouth 3 times daily  traZODone (DESYREL) 100 MG tablet, Take 1.5-2 tablets (150-200 mg) by mouth nightly as needed for sleep  Naloxone HCl (EVZIO) 2 MG/0.4ML SOAJ, Inject 2 mLs as directed as needed (opiod reversal) Every 2-3 minutes as needed until assistance arrives (Patient not taking: Reported on 6/19/2020)  tiotropium 2.5 MCG/ACT IN inhaler, Inhale 2 puffs into the lungs daily  [DISCONTINUED] fluticasone-salmeterol (ADVAIR) 500-50 MCG/DOSE diskus inhaler, Inhale 1 puff into the lungs 2 times daily    No current facility-administered medications on file prior to visit.          Patient Active Problem List   Diagnosis     Other acne     Chronic neck pain     Obesity     Major depression in partial remission     Fusion of spine of cervical region     CARDIOVASCULAR SCREENING; LDL GOAL LESS THAN 160     Depression with anxiety     Emphysema lung (H)      COPD, severe (H)     Former smoker     Chronic insomnia     Hypoxia     O2 dependent     Personal history of tobacco use, presenting hazards to health     Acute bronchitis with coexisting condition requiring prophylactic treatment     Hypertension goal BP (blood pressure) < 140/90     Past Surgical History:   Procedure Laterality Date     C APPENDECTOMY       C  DELIVERY ONLY  ,,    , Low Cervical     C LIGATE FALLOPIAN TUBE      S/P tubal ligation     C TOTAL ABDOM HYSTERECTOMY      not total     pt did not have ovaries removed     HC ENLARGE BREAST WITH IMPLANT       SURGICAL HISTORY OF -   10/18/2005    C6 corpectomy with iliac crest autograft fixation. [facility]       Social History     Tobacco Use     Smoking status: Current Every Day Smoker     Packs/day: 0.75     Years: 40.00     Pack years: 30.00     Types: Cigarettes     Smokeless tobacco: Never Used     Tobacco comment: started age 14.   Substance Use Topics     Alcohol use: Yes     Alcohol/week: 0.0 standard drinks     Comment: occ     Family History   Problem Relation Age of Onset     Cerebrovascular Disease Mother      Chronic Obstructive Pulmonary Disease Mother      Arthritis Father      Diabetes Maternal Grandmother      Cancer Maternal Aunt         breast             Problem list, Medication list, Allergies, and Medical/Social/Surgical histories reviewed in [facility] and updated as appropriate.    OBJECTIVE:                                                    Vitals not obtained.  GENERAL: healthy, alert and no distress        Diagnostic test results:  No results found for any visits on 20.      ASSESSMENT/PLAN:                                                      54 year old female with the following diagnoses and treatment plan:      ICD-10-CM    1. Chronic neck pain  M54.2 naloxone (NARCAN) 4 MG/0.1ML nasal spray    G89.29 Comprehensive metabolic panel     Pain Drug Scr UR W Rptd Meds   2.  Long term prescription opiate use  Z79.891 naloxone (NARCAN) 4 MG/0.1ML nasal spray     Comprehensive metabolic panel     Pain Drug Scr UR W Rptd Meds   3. COPD, severe (H)  J44.9    4. O2 dependent  Z99.81    5. History of hypertension  Z86.79 Comprehensive metabolic panel     Albumin Random Urine Quantitative with Creat Ratio   6. CARDIOVASCULAR SCREENING; LDL GOAL LESS THAN 160  Z13.6 Comprehensive metabolic panel     Lipid panel reflex to direct LDL Fasting       --Patient with severe COPD, oxygen dependent, with chronic pain syndrome on long-term opioid.  For now patient will continue current medications, we will revisit in 6 months.  Narcan nasal be given.  --History of hypertension: Her blood pressure has been stable: Currently not on antihypertensives.  Was on lisinopril for some time, discontinued a year ago  --Return in 6 months for annual wellness visit and monitoring labs, ordered.    See Patient Instructions on After Visit Summary.    Patient will call or return to clinic if worsening or symptoms not improving as discussed.    Video-Visit Details    Type of service:  Video Visit    Video Start Time:11:35 AM    Video End Time:11: 52 AM    Originating Location (pt. Location): Home    Distant Location (provider location):  [facility]     Platform used for Video Visit: Joy Topete MD-PhD  [facility]     (Note: Chart documentation was done in part with Dragon Voice Recognition software. Although reviewed after completion, some word and grammatical errors may remain.)

## 2020-06-22 ENCOUNTER — TELEPHONE (OUTPATIENT)
Dept: PEDIATRICS | Facility: CLINIC | Age: 55
End: 2020-06-22

## 2020-06-22 NOTE — TELEPHONE ENCOUNTER
Request from pharm. rx fentanyl 100mcg/hr not covered by pt plan. Preferred rx fentanyl morphinesulfateer. Routing to provider. Pauline Ryder LPN

## 2020-06-23 NOTE — TELEPHONE ENCOUNTER
Prior Authorization Approval    Authorization Effective Date: 5/24/2020  Authorization Expiration Date: 6/23/2021  Medication: fentaNYL (DURAGESIC) 100 mcg/hr 72 hr patch   Approved Dose/Quantity:   Reference #:     Insurance Company: [employer] - Phone [phone] Fax [phone]  Expected CoPay:       CoPay Card Available:      Foundation Assistance Needed:    Which Pharmacy is filling the prescription (Not needed for infusion/clinic administered): [id number] [place] [id number]  Pharmacy Notified: Yes--Pharmacy will notify patient when ready.  Patient Notified: No

## 2020-08-17 ENCOUNTER — MYC REFILL (OUTPATIENT)
Dept: PEDIATRICS | Facility: CLINIC | Age: 55
End: 2020-08-17

## 2020-08-17 DIAGNOSIS — G89.29 CHRONIC NECK PAIN: ICD-10-CM

## 2020-08-17 DIAGNOSIS — M54.2 CHRONIC NECK PAIN: ICD-10-CM

## 2020-08-17 DIAGNOSIS — M43.22 FUSION OF SPINE OF CERVICAL REGION: ICD-10-CM

## 2020-08-17 DIAGNOSIS — Z98.1 STATUS POST CERVICAL SPINAL ARTHRODESIS: ICD-10-CM

## 2020-08-17 DIAGNOSIS — Z79.891 LONG TERM CURRENT USE OF OPIATE ANALGESIC: ICD-10-CM

## 2020-08-17 RX ORDER — HYDROCODONE BITARTRATE AND ACETAMINOPHEN 10; 325 MG/1; MG/1
1 TABLET ORAL EVERY 4 HOURS PRN
Qty: 180 TABLET | Refills: 0 | Status: SHIPPED | OUTPATIENT
Start: 2020-08-17 | End: 2020-09-18

## 2020-08-17 RX ORDER — FENTANYL 100 UG/1
1 PATCH TRANSDERMAL
Qty: 10 PATCH | Refills: 0 | Status: SHIPPED | OUTPATIENT
Start: 2020-08-17 | End: 2020-09-18

## 2020-08-17 NOTE — TELEPHONE ENCOUNTER
Fentanyl  Last Written Prescription Date:  7/19/2020  Last Fill Quantity: 10,  # refills: 0   Last office visit: 6/20/2019 with prescribing provider:  Dr. Topete   Future Office Visit:    Routing refill request to provider for review/approval because:  Drug not on the [facility] refill protocol     Hydrocodone  Last Written Prescription Date:  7/19/2020  Last Fill Quantity: 180,  # refills: 0   Last office visit: 6/20/2019 with prescribing provider:  Dr. Topete   Future Office Visit:    Routing refill request to provider for review/approval because:  Drug not on the [facility] refill protocol

## 2020-09-14 ENCOUNTER — MYC REFILL (OUTPATIENT)
Dept: PEDIATRICS | Facility: CLINIC | Age: 55
End: 2020-09-14

## 2020-09-14 DIAGNOSIS — Z98.1 STATUS POST CERVICAL SPINAL ARTHRODESIS: ICD-10-CM

## 2020-09-14 DIAGNOSIS — M54.2 CHRONIC NECK PAIN: ICD-10-CM

## 2020-09-14 DIAGNOSIS — G89.29 CHRONIC NECK PAIN: ICD-10-CM

## 2020-09-14 DIAGNOSIS — M43.22 FUSION OF SPINE OF CERVICAL REGION: ICD-10-CM

## 2020-09-14 DIAGNOSIS — Z79.891 LONG TERM CURRENT USE OF OPIATE ANALGESIC: ICD-10-CM

## 2020-09-18 RX ORDER — HYDROCODONE BITARTRATE AND ACETAMINOPHEN 10; 325 MG/1; MG/1
1 TABLET ORAL EVERY 4 HOURS PRN
Qty: 180 TABLET | Refills: 0 | Status: SHIPPED | OUTPATIENT
Start: 2020-09-18 | End: 2020-10-15

## 2020-09-18 RX ORDER — FENTANYL 100 UG/1
1 PATCH TRANSDERMAL
Qty: 10 PATCH | Refills: 0 | OUTPATIENT
Start: 2020-09-18

## 2020-09-18 RX ORDER — HYDROCODONE BITARTRATE AND ACETAMINOPHEN 10; 325 MG/1; MG/1
1 TABLET ORAL EVERY 4 HOURS PRN
Qty: 180 TABLET | Refills: 0 | OUTPATIENT
Start: 2020-09-18

## 2020-09-18 RX ORDER — FENTANYL 100 UG/1
1 PATCH TRANSDERMAL
Qty: 10 PATCH | Refills: 0 | Status: SHIPPED | OUTPATIENT
Start: 2020-09-18 | End: 2020-10-15

## 2020-09-18 NOTE — TELEPHONE ENCOUNTER
Pending Prescriptions:                       Disp   Refills    fentaNYL (DURAGESIC) 100 mcg/hr 72 hr pat*10 pat*0            Sig: Place 1 patch onto the skin every 72 hours (Mylan           brand)    HYDROcodone-acetaminophen (NORCO)  *180 ta*0            Sig: Take 1 tablet by mouth every 4 hours as needed           for pain (For 1 time increase only)    Future Office Visit:      Routing refill request to provider for review/approval because:  Drug not on the [facility], P or [facility] refill protocol or controlled substance

## 2020-11-16 ENCOUNTER — MYC REFILL (OUTPATIENT)
Dept: PEDIATRICS | Facility: CLINIC | Age: 55
End: 2020-11-16

## 2020-11-16 DIAGNOSIS — Z79.891 LONG TERM CURRENT USE OF OPIATE ANALGESIC: ICD-10-CM

## 2020-11-16 DIAGNOSIS — G89.29 CHRONIC NECK PAIN: ICD-10-CM

## 2020-11-16 DIAGNOSIS — M54.2 CHRONIC NECK PAIN: ICD-10-CM

## 2020-11-16 DIAGNOSIS — Z98.1 STATUS POST CERVICAL SPINAL ARTHRODESIS: ICD-10-CM

## 2020-11-16 DIAGNOSIS — M43.22 FUSION OF SPINE OF CERVICAL REGION: ICD-10-CM

## 2020-11-16 RX ORDER — HYDROCODONE BITARTRATE AND ACETAMINOPHEN 10; 325 MG/1; MG/1
1 TABLET ORAL EVERY 4 HOURS PRN
Qty: 180 TABLET | Refills: 0 | Status: SHIPPED | OUTPATIENT
Start: 2020-11-20 | End: 2020-12-12

## 2020-11-16 RX ORDER — FENTANYL 100 UG/1
1 PATCH TRANSDERMAL
Qty: 10 PATCH | Refills: 0 | Status: SHIPPED | OUTPATIENT
Start: 2020-11-20 | End: 2020-12-12

## 2020-11-16 NOTE — TELEPHONE ENCOUNTER
Pending Prescriptions:                       Disp   Refills    fentaNYL (DURAGESIC) 100 mcg/hr 72 hr pat*10 pat*0            Sig: Place 1 patch onto the skin every 72 hours (Mylan           brand)    HYDROcodone-acetaminophen (NORCO)  *180 ta*0            Sig: Take 1 tablet by mouth every 4 hours as needed           for pain (For 1 time increase only)        Last office visit: 6/19/2020 with prescribing provider:  Dr. Maki Topete   Future Office Visit:      Routing refill request to provider for review/approval because:  Drug not on the [facility], P or [facility] refill protocol or controlled substance

## 2020-11-20 ENCOUNTER — MYC MEDICAL ADVICE (OUTPATIENT)
Dept: PULMONOLOGY | Facility: CLINIC | Age: 55
End: 2020-11-20

## 2020-12-12 ENCOUNTER — MYC REFILL (OUTPATIENT)
Dept: PEDIATRICS | Facility: CLINIC | Age: 55
End: 2020-12-12

## 2020-12-12 DIAGNOSIS — M43.22 FUSION OF SPINE OF CERVICAL REGION: ICD-10-CM

## 2020-12-12 DIAGNOSIS — Z79.891 LONG TERM CURRENT USE OF OPIATE ANALGESIC: ICD-10-CM

## 2020-12-12 DIAGNOSIS — G89.29 CHRONIC NECK PAIN: ICD-10-CM

## 2020-12-12 DIAGNOSIS — M54.2 CHRONIC NECK PAIN: ICD-10-CM

## 2020-12-12 DIAGNOSIS — Z98.1 STATUS POST CERVICAL SPINAL ARTHRODESIS: ICD-10-CM

## 2020-12-14 RX ORDER — FENTANYL 100 UG/1
1 PATCH TRANSDERMAL
Qty: 10 PATCH | Refills: 0 | Status: SHIPPED | OUTPATIENT
Start: 2020-12-20 | End: 2021-01-15

## 2020-12-14 RX ORDER — HYDROCODONE BITARTRATE AND ACETAMINOPHEN 10; 325 MG/1; MG/1
1 TABLET ORAL EVERY 4 HOURS PRN
Qty: 180 TABLET | Refills: 0 | Status: SHIPPED | OUTPATIENT
Start: 2020-12-20 | End: 2021-01-15

## 2020-12-14 NOTE — TELEPHONE ENCOUNTER
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     ESKD: due to chronic obstructive uropathy with BPH (uses straight cath); has been following Cecilia Britt NP and has been preparing to initiate HD with GFR 10-11 ml/mn since 9/2017. Recent Urine protein 1.5 g/gCr. Has AVF created 11/22/17 by Dr. Pabon, last seen by Dr. Pabon 1/2/18 and fistula has been maturing nicely.  - Dialyzed Tues, Wed, again tomorrow  - Will dialyze MWF at [facility], will start Monday 4/29   - ok to discharge Thursday after HD     Acid/base:  - Discontinue PTA PO bicarb; will now get bicarb via dialysis     Volume: pt appears euvolemic and still makes significant urine (straight caths bid as well as spontaneous voiding). On bumex 2 mg bid (recently started at OSH due to pulm edema); O2 100% RA  - Continue Bumex  - Daily weights     HFrEF: EF 30-35%  BP: normotensive, 100-115's. Had been on amlodipine 2.5 mg qday and metoprolol XL 25 mg qday  - BP meds held for now (recommend stopping amlodipine)     BMD: Ca 8.1, phos 5.4; Recent , Vit D 41, on sevelamer 800 mg tid WM  - Continue sevelamer tid WM        Anemia: hgb 8.5 g/dL; labs 3/25: iron 31, ferr, 351, IS 13, hgb 9-10's.  - Will initiate iron loading with IV venofer 100 mg per HD x 10  - Will hold off on EARLENE until iron loading is completed  - Discontinue PTA subcutaneous aranesp and PO iron (both will be given via HD)            Recommendations were communicated to primary team via this note       Nuria Stephenson, PA-C  [phone]    Interval History :   Seen on dialysis, stable run. Will dialyze tomorrow then discharge and start at [facility] Monday.    Review of Systems:   4 point ROS neg other than as noted  fentaNYL (DURAGESIC) 100 mcg/hr 72 hr patch  Last Written Prescription Date:  11/20/2020  Last Fill Quantity: 10,  # refills: 0   Last office visit: 6/20/2019 with prescribing provider, virtual on 6/19/2020      HYDROcodone-acetaminophen (NORCO)  MG per tablet  Last Written Prescription Date:  11/20/2020  Last Fill Quantity: 180,  # refills: 0   Last office visit: 6/20/2019 with prescribing provider, virtual on 6/19/2020   Future Office Visit:   Next 5 appointments (look out 90 days)    Feb 08, 2021  9:00 AM  Office Visit with PFT LAB  [facility] ([facility]) [address] [place] [id number] [phone]           Routing refill request to provider for review/approval because:  Drug not on the [facility] refill protocol     Mali Ramon RN                   "above.    Physical Exam:   I/O last 3 completed shifts:  In: 360 [P.O.:360]  Out: 675 [Urine:675]   /74   Pulse 93   Temp 98.4  F (36.9  C) (Oral)   Resp 18   Ht 1.778 m (5' 10\")   Wt 70.7 kg (155 lb 13.8 oz)   SpO2 100%   BMI 22.36 kg/m       GENERAL APPEARANCE: alert  EYES:  no scleral icterus, pupils equal  HENT: mouth without ulcers or lesions  PULM: CTA  CV: regular rhythm, normal rate      -edema none   GI: soft  INTEGUMENT: no cyanosis, no rash  NEURO:  no asterixis   Access LUE AVF    Labs:   All labs reviewed by me  Electrolytes/Renal -   Recent Labs   Lab Test 04/24/19 0524 04/23/19  0824 04/22/19  1114  03/11/19  0954  09/05/17  0645 09/04/17  0607    139 141   < > 142  143   < > 141 142   POTASSIUM 3.9 4.2 3.9   < > 4.4  4.4   < > 3.7 3.7   CHLORIDE 106 107 108   < > 110*  110*   < > 105 106   CO2 22 21 22   < > 19*  19*   < > 29 29   BUN 74* 103* 113*   < > 116*  118*   < > 74* 79*   CR 4.64* 5.31* 5.44*   < > 5.46*  5.78*   < > 4.64* 4.60*   GLC 88 98 82   < > 142*  143*   < > 83 86   EDRICK 8.1* 8.3* 8.6   < > 8.4*  8.4*   < > 7.4* 7.4*   MAG 2.1  --   --   --   --   --  1.7 1.6   PHOS 5.4* 7.2*  --   --  5.2*   < >  --  3.2    < > = values in this interval not displayed.       CBC -   Recent Labs   Lab Test 04/24/19 0524 04/23/19  0824 04/22/19  1114   WBC 8.5 11.2* 7.1   HGB 8.5* 9.8* 10.1*    203 189       LFTs -   Recent Labs   Lab Test 03/11/19  0954 02/25/19  1012 02/18/19  0943  12/11/17  1018  08/28/17  1909  07/19/17  0537   ALKPHOS  --   --   --   --  63  --  95  --  51   BILITOTAL  --   --   --   --  0.2  --  0.3  --  0.2   ALT  --   --   --   --  17  --  17  --  17   AST  --   --   --   --  13  --  9  --  32   PROTTOTAL  --   --   --   --  7.3  --  7.2  --  4.4*   ALBUMIN 2.9* 3.2* 3.2*   < > 3.0*   < > 3.0*   < > 1.9*    < > = values in this interval not displayed.       Iron Panel -   Recent Labs   Lab Test 03/25/19  0932 01/16/19  1255 09/24/18  0956 "   IRON 31* 33* 62   IRONSAT 13* 15 25    499* 458*         Imaging:  Reviewed    Current Medications:    aspirin  81 mg Oral Daily     bumetanide  2 mg Oral BID     fish oil-omega-3 fatty acids  1 g Oral Daily     multivitamin, therapeutic  1 tablet Oral Daily     rosuvastatin  40 mg Oral Daily     sevelamer  1,600 mg Oral TID w/meals     sodium chloride (PF)  3 mL Intracatheter Q8H     ticagrelor  90 mg Oral BID       - MEDICATION INSTRUCTIONS -       - MEDICATION INSTRUCTIONS -       - MEDICATION INSTRUCTIONS -       Percutaneous Coronary Intervention orders placed (this is information for BPA alerting)       Nuria Stephenson PA-C

## 2020-12-15 DIAGNOSIS — J44.1 OBSTRUCTIVE CHRONIC BRONCHITIS WITH EXACERBATION (H): ICD-10-CM

## 2020-12-15 NOTE — TELEPHONE ENCOUNTER
Writer received a refill request from: Steve in [place].     Medication:     albuterol 108 (90 Base) MCG/ACT IN inhaler 1 Inhaler 11 2/20/2020  No   Sig - Route: Inhale 2 puffs into the lungs every 6 hours as needed for shortness of breath / dyspnea or wheezing - Inhalation     Date last written: 02/20/2020  Dispensed amount: 1  Refills: 11  Date last dispensed: 11/16/2020      Pt's last office visit: 02/20/2020  Next scheduled office visit: 02/18/2020      Per the RN/LPN medication refill protocol, writer is  to refill this request.     (If able to refill, please call the pt to inform them the RX was sent to the pharmacy. If unable to refill, route this encounter to the prescribing physician for authorization or further instructions)

## 2020-12-16 RX ORDER — ALBUTEROL SULFATE 90 UG/1
2 AEROSOL, METERED RESPIRATORY (INHALATION) EVERY 6 HOURS PRN
Qty: 1 INHALER | Refills: 11 | Status: SHIPPED | OUTPATIENT
Start: 2020-12-16 | End: 2021-01-01

## 2021-01-01 ENCOUNTER — TELEPHONE (OUTPATIENT)
Dept: FAMILY MEDICINE | Facility: CLINIC | Age: 56
End: 2021-01-01

## 2021-01-01 ENCOUNTER — OFFICE VISIT (OUTPATIENT)
Dept: NURSING | Facility: CLINIC | Age: 56
End: 2021-01-01
Payer: COMMERCIAL

## 2021-01-01 ENCOUNTER — MYC REFILL (OUTPATIENT)
Dept: FAMILY MEDICINE | Facility: CLINIC | Age: 56
End: 2021-01-01

## 2021-01-01 ENCOUNTER — OFFICE VISIT (OUTPATIENT)
Dept: FAMILY MEDICINE | Facility: CLINIC | Age: 56
End: 2021-01-01
Payer: COMMERCIAL

## 2021-01-01 ENCOUNTER — PATIENT OUTREACH (OUTPATIENT)
Dept: PULMONOLOGY | Facility: CLINIC | Age: 56
End: 2021-01-01

## 2021-01-01 ENCOUNTER — MYC MEDICAL ADVICE (OUTPATIENT)
Dept: FAMILY MEDICINE | Facility: CLINIC | Age: 56
End: 2021-01-01

## 2021-01-01 ENCOUNTER — TELEPHONE (OUTPATIENT)
Dept: PULMONOLOGY | Facility: CLINIC | Age: 56
End: 2021-01-01

## 2021-01-01 ENCOUNTER — ANCILLARY PROCEDURE (OUTPATIENT)
Dept: CARDIOLOGY | Facility: CLINIC | Age: 56
End: 2021-01-01
Attending: INTERNAL MEDICINE
Payer: COMMERCIAL

## 2021-01-01 ENCOUNTER — MYC MEDICAL ADVICE (OUTPATIENT)
Dept: PULMONOLOGY | Facility: CLINIC | Age: 56
End: 2021-01-01

## 2021-01-01 ENCOUNTER — VIRTUAL VISIT (OUTPATIENT)
Dept: FAMILY MEDICINE | Facility: CLINIC | Age: 56
End: 2021-01-01
Payer: COMMERCIAL

## 2021-01-01 ENCOUNTER — HEALTH MAINTENANCE LETTER (OUTPATIENT)
Age: 56
End: 2021-01-01

## 2021-01-01 ENCOUNTER — TELEPHONE (OUTPATIENT)
Dept: FAMILY MEDICINE | Facility: CLINIC | Age: 56
End: 2021-01-01
Payer: COMMERCIAL

## 2021-01-01 ENCOUNTER — OFFICE VISIT (OUTPATIENT)
Dept: PULMONOLOGY | Facility: CLINIC | Age: 56
End: 2021-01-01
Payer: COMMERCIAL

## 2021-01-01 ENCOUNTER — MYC REFILL (OUTPATIENT)
Dept: FAMILY MEDICINE | Facility: CLINIC | Age: 56
End: 2021-01-01
Payer: COMMERCIAL

## 2021-01-01 ENCOUNTER — ANCILLARY PROCEDURE (OUTPATIENT)
Dept: GENERAL RADIOLOGY | Facility: CLINIC | Age: 56
End: 2021-01-01
Attending: INTERNAL MEDICINE
Payer: COMMERCIAL

## 2021-01-01 ENCOUNTER — IMMUNIZATION (OUTPATIENT)
Dept: PEDIATRICS | Facility: CLINIC | Age: 56
End: 2021-01-01
Attending: INTERNAL MEDICINE
Payer: COMMERCIAL

## 2021-01-01 ENCOUNTER — TELEPHONE (OUTPATIENT)
Facility: CLINIC | Age: 56
End: 2021-01-01

## 2021-01-01 VITALS
SYSTOLIC BLOOD PRESSURE: 143 MMHG | HEART RATE: 84 BPM | TEMPERATURE: 98.1 F | HEIGHT: 63 IN | RESPIRATION RATE: 18 BRPM | WEIGHT: 175 LBS | DIASTOLIC BLOOD PRESSURE: 91 MMHG | OXYGEN SATURATION: 92 % | BODY MASS INDEX: 31.01 KG/M2

## 2021-01-01 VITALS
RESPIRATION RATE: 18 BRPM | HEIGHT: 63 IN | DIASTOLIC BLOOD PRESSURE: 74 MMHG | HEART RATE: 82 BPM | TEMPERATURE: 97.5 F | OXYGEN SATURATION: 94 % | WEIGHT: 180.25 LBS | BODY MASS INDEX: 31.94 KG/M2 | SYSTOLIC BLOOD PRESSURE: 116 MMHG

## 2021-01-01 VITALS
SYSTOLIC BLOOD PRESSURE: 130 MMHG | WEIGHT: 183 LBS | HEIGHT: 63 IN | OXYGEN SATURATION: 94 % | HEART RATE: 88 BPM | DIASTOLIC BLOOD PRESSURE: 87 MMHG | BODY MASS INDEX: 32.43 KG/M2 | TEMPERATURE: 97.9 F | RESPIRATION RATE: 18 BRPM

## 2021-01-01 VITALS — BODY MASS INDEX: 32.52 KG/M2 | WEIGHT: 183.6 LBS | HEART RATE: 102 BPM | OXYGEN SATURATION: 94 %

## 2021-01-01 VITALS
RESPIRATION RATE: 20 BRPM | WEIGHT: 184 LBS | TEMPERATURE: 97.7 F | OXYGEN SATURATION: 95 % | HEART RATE: 113 BPM | DIASTOLIC BLOOD PRESSURE: 93 MMHG | SYSTOLIC BLOOD PRESSURE: 135 MMHG | BODY MASS INDEX: 32.59 KG/M2

## 2021-01-01 VITALS — BODY MASS INDEX: 31.14 KG/M2 | WEIGHT: 175.8 LBS | OXYGEN SATURATION: 96 % | HEART RATE: 88 BPM

## 2021-01-01 VITALS
OXYGEN SATURATION: 93 % | WEIGHT: 183.31 LBS | RESPIRATION RATE: 20 BRPM | HEART RATE: 111 BPM | SYSTOLIC BLOOD PRESSURE: 130 MMHG | DIASTOLIC BLOOD PRESSURE: 76 MMHG | BODY MASS INDEX: 32.47 KG/M2

## 2021-01-01 VITALS — OXYGEN SATURATION: 92 % | BODY MASS INDEX: 30.33 KG/M2 | WEIGHT: 171.2 LBS | HEART RATE: 87 BPM

## 2021-01-01 DIAGNOSIS — Z98.1 STATUS POST CERVICAL SPINAL ARTHRODESIS: ICD-10-CM

## 2021-01-01 DIAGNOSIS — G89.29 CHRONIC NECK PAIN: ICD-10-CM

## 2021-01-01 DIAGNOSIS — M54.2 CHRONIC NECK PAIN: ICD-10-CM

## 2021-01-01 DIAGNOSIS — J44.9 CHRONIC OBSTRUCTIVE PULMONARY DISEASE, UNSPECIFIED COPD TYPE (H): ICD-10-CM

## 2021-01-01 DIAGNOSIS — J43.9 PULMONARY EMPHYSEMA, UNSPECIFIED EMPHYSEMA TYPE (H): ICD-10-CM

## 2021-01-01 DIAGNOSIS — J44.1 OBSTRUCTIVE CHRONIC BRONCHITIS WITH EXACERBATION (H): ICD-10-CM

## 2021-01-01 DIAGNOSIS — Z79.891 LONG TERM CURRENT USE OF OPIATE ANALGESIC: ICD-10-CM

## 2021-01-01 DIAGNOSIS — Z99.81 O2 DEPENDENT: ICD-10-CM

## 2021-01-01 DIAGNOSIS — I10 HYPERTENSION GOAL BP (BLOOD PRESSURE) < 140/90: Primary | ICD-10-CM

## 2021-01-01 DIAGNOSIS — Z87.891 PERSONAL HISTORY OF TOBACCO USE: ICD-10-CM

## 2021-01-01 DIAGNOSIS — M43.22 FUSION OF SPINE OF CERVICAL REGION: ICD-10-CM

## 2021-01-01 DIAGNOSIS — G89.4 CHRONIC PAIN SYNDROME: ICD-10-CM

## 2021-01-01 DIAGNOSIS — Z23 FLU VACCINE NEED: ICD-10-CM

## 2021-01-01 DIAGNOSIS — R09.81 NASAL CONGESTION: ICD-10-CM

## 2021-01-01 DIAGNOSIS — R05.8 PRODUCTIVE COUGH: ICD-10-CM

## 2021-01-01 DIAGNOSIS — F11.90 CHRONIC, CONTINUOUS USE OF OPIOIDS: ICD-10-CM

## 2021-01-01 DIAGNOSIS — Z01.818 PREOP GENERAL PHYSICAL EXAM: Primary | ICD-10-CM

## 2021-01-01 DIAGNOSIS — R91.8 PULMONARY NODULES: ICD-10-CM

## 2021-01-01 DIAGNOSIS — J44.1 COPD EXACERBATION (H): Primary | ICD-10-CM

## 2021-01-01 DIAGNOSIS — G89.29 CHRONIC NECK PAIN: Primary | ICD-10-CM

## 2021-01-01 DIAGNOSIS — J39.2 THROAT MASS: ICD-10-CM

## 2021-01-01 DIAGNOSIS — R53.83 FATIGUE, UNSPECIFIED TYPE: ICD-10-CM

## 2021-01-01 DIAGNOSIS — F17.209 TOBACCO USE DISORDER, CONTINUOUS: ICD-10-CM

## 2021-01-01 DIAGNOSIS — R06.09 DOE (DYSPNEA ON EXERTION): ICD-10-CM

## 2021-01-01 DIAGNOSIS — Z23 HIGH PRIORITY FOR 2019-NCOV VACCINE: ICD-10-CM

## 2021-01-01 DIAGNOSIS — Z86.79 HISTORY OF HYPERTENSION: ICD-10-CM

## 2021-01-01 DIAGNOSIS — J02.9 PHARYNGITIS, UNSPECIFIED ETIOLOGY: ICD-10-CM

## 2021-01-01 DIAGNOSIS — J44.9 COPD, SEVERE (H): ICD-10-CM

## 2021-01-01 DIAGNOSIS — J44.1 OBSTRUCTIVE CHRONIC BRONCHITIS WITH EXACERBATION (H): Primary | ICD-10-CM

## 2021-01-01 DIAGNOSIS — J02.9 SORE THROAT: Primary | ICD-10-CM

## 2021-01-01 DIAGNOSIS — M54.2 CHRONIC NECK PAIN: Primary | ICD-10-CM

## 2021-01-01 DIAGNOSIS — R05.9 COUGH: ICD-10-CM

## 2021-01-01 DIAGNOSIS — R49.0 HOARSENESS: ICD-10-CM

## 2021-01-01 DIAGNOSIS — R09.02 HYPOXIA: ICD-10-CM

## 2021-01-01 DIAGNOSIS — D64.9 NORMOCYTIC ANEMIA: ICD-10-CM

## 2021-01-01 DIAGNOSIS — F51.04 CHRONIC INSOMNIA: ICD-10-CM

## 2021-01-01 DIAGNOSIS — M62.838 MUSCLE SPASM: ICD-10-CM

## 2021-01-01 DIAGNOSIS — Z13.6 CARDIOVASCULAR SCREENING; LDL GOAL LESS THAN 160: ICD-10-CM

## 2021-01-01 DIAGNOSIS — R91.8 PULMONARY NODULES: Primary | ICD-10-CM

## 2021-01-01 DIAGNOSIS — J44.9 CHRONIC OBSTRUCTIVE PULMONARY DISEASE, UNSPECIFIED COPD TYPE (H): Primary | ICD-10-CM

## 2021-01-01 LAB
ALBUMIN SERPL-MCNC: 3.7 G/DL (ref 3.4–5)
ALP SERPL-CCNC: 67 U/L (ref 40–150)
ALT SERPL W P-5'-P-CCNC: 22 U/L (ref 0–50)
ANION GAP SERPL CALCULATED.3IONS-SCNC: 2 MMOL/L (ref 3–14)
AST SERPL W P-5'-P-CCNC: 12 U/L (ref 0–45)
BILIRUB SERPL-MCNC: 0.2 MG/DL (ref 0.2–1.3)
BUN SERPL-MCNC: 13 MG/DL (ref 7–30)
CALCIUM SERPL-MCNC: 9.6 MG/DL (ref 8.5–10.1)
CHLORIDE BLD-SCNC: 102 MMOL/L (ref 94–109)
CO2 SERPL-SCNC: 38 MMOL/L (ref 20–32)
CREAT SERPL-MCNC: 0.79 MG/DL (ref 0.52–1.04)
DLCOUNC-%PRED-PRE: 43 %
DLCOUNC-%PRED-PRE: 50 %
DLCOUNC-%PRED-PRE: 55 %
DLCOUNC-PRE: 10.88 ML/MIN/MMHG
DLCOUNC-PRE: 8.43 ML/MIN/MMHG
DLCOUNC-PRE: 9.8 ML/MIN/MMHG
DLCOUNC-PRED: 19.44 ML/MIN/MMHG
DLCOUNC-PRED: 19.52 ML/MIN/MMHG
DLCOUNC-PRED: 19.52 ML/MIN/MMHG
ERV-%PRED-PRE: 137 %
ERV-%PRED-PRE: 91 %
ERV-%PRED-PRE: 96 %
ERV-PRE: 0.49 L
ERV-PRE: 0.54 L
ERV-PRE: 0.78 L
ERV-PRED: 0.51 L
ERV-PRED: 0.57 L
ERV-PRED: 0.6 L
ERYTHROCYTE [DISTWIDTH] IN BLOOD BY AUTOMATED COUNT: 13.1 % (ref 10–15)
EXPTIME-PRE: 11.4 SEC
EXPTIME-PRE: 7.86 SEC
EXPTIME-PRE: 8.14 SEC
FEF2575-%PRED-PRE: 14 %
FEF2575-%PRED-PRE: 7 %
FEF2575-%PRED-PRE: 8 %
FEF2575-PRE: 0.17 L/SEC
FEF2575-PRE: 0.21 L/SEC
FEF2575-PRE: 0.34 L/SEC
FEF2575-PRED: 2.33 L/SEC
FEF2575-PRED: 2.37 L/SEC
FEF2575-PRED: 2.37 L/SEC
FEFMAX-%PRED-PRE: 29 %
FEFMAX-%PRED-PRE: 34 %
FEFMAX-%PRED-PRE: 51 %
FEFMAX-PRE: 1.88 L/SEC
FEFMAX-PRE: 2.15 L/SEC
FEFMAX-PRE: 3.21 L/SEC
FEFMAX-PRED: 6.22 L/SEC
FEFMAX-PRED: 6.27 L/SEC
FEFMAX-PRED: 6.27 L/SEC
FEV1-%PRED-PRE: 20 %
FEV1-%PRED-PRE: 21 %
FEV1-%PRED-PRE: 35 %
FEV1-PRE: 0.5 L
FEV1-PRE: 0.52 L
FEV1-PRE: 0.88 L
FEV1FEV6-PRE: 37 %
FEV1FEV6-PRE: 40 %
FEV1FEV6-PRE: 44 %
FEV1FEV6-PRED: 81 %
FEV1FVC-PRE: 32 %
FEV1FVC-PRE: 33 %
FEV1FVC-PRE: 41 %
FEV1FVC-PRED: 80 %
FEV1SVC-PRE: 28 %
FEV1SVC-PRE: 29 %
FEV1SVC-PRE: 34 %
FEV1SVC-PRED: 78 %
FIFMAX-PRE: 2.98 L/SEC
FIFMAX-PRE: 3.51 L/SEC
FIFMAX-PRE: 5.42 L/SEC
FVC-%PRED-PRE: 51 %
FVC-%PRED-PRE: 51 %
FVC-%PRED-PRE: 68 %
FVC-PRE: 1.59 L
FVC-PRE: 1.6 L
FVC-PRE: 2.14 L
FVC-PRED: 3.09 L
FVC-PRED: 3.12 L
FVC-PRED: 3.12 L
GFR SERPL CREATININE-BSD FRML MDRD: 87 ML/MIN/1.73M2
GLUCOSE BLD-MCNC: 99 MG/DL (ref 70–99)
HCT VFR BLD AUTO: 37.9 % (ref 35–47)
HGB BLD-MCNC: 11 G/DL (ref 11.7–15.7)
IC-%PRED-PRE: 49 %
IC-%PRED-PRE: 49 %
IC-%PRED-PRE: 70 %
IC-PRE: 1.28 L
IC-PRE: 1.31 L
IC-PRE: 1.82 L
IC-PRED: 2.57 L
IC-PRED: 2.6 L
IC-PRED: 2.64 L
MCH RBC QN AUTO: 28.4 PG (ref 26.5–33)
MCHC RBC AUTO-ENTMCNC: 29 G/DL (ref 31.5–36.5)
MCV RBC AUTO: 98 FL (ref 78–100)
PLATELET # BLD AUTO: 307 10E3/UL (ref 150–450)
POTASSIUM BLD-SCNC: 4.6 MMOL/L (ref 3.4–5.3)
PROT SERPL-MCNC: 7.4 G/DL (ref 6.8–8.8)
RBC # BLD AUTO: 3.88 10E6/UL (ref 3.8–5.2)
SODIUM SERPL-SCNC: 142 MMOL/L (ref 133–144)
VA-%PRED-PRE: 72 %
VA-%PRED-PRE: 75 %
VA-%PRED-PRE: 96 %
VA-PRE: 3.31 L
VA-PRE: 3.43 L
VA-PRE: 4.37 L
VC-%PRED-PRE: 57 %
VC-%PRED-PRE: 57 %
VC-%PRED-PRE: 82 %
VC-PRE: 1.8 L
VC-PRE: 1.82 L
VC-PRE: 2.6 L
VC-PRED: 3.14 L
VC-PRED: 3.16 L
VC-PRED: 3.16 L
WBC # BLD AUTO: 10.3 10E3/UL (ref 4–11)

## 2021-01-01 PROCEDURE — 99214 OFFICE O/P EST MOD 30 MIN: CPT | Performed by: INTERNAL MEDICINE

## 2021-01-01 PROCEDURE — 94375 RESPIRATORY FLOW VOLUME LOOP: CPT | Performed by: INTERNAL MEDICINE

## 2021-01-01 PROCEDURE — 85027 COMPLETE CBC AUTOMATED: CPT | Performed by: INTERNAL MEDICINE

## 2021-01-01 PROCEDURE — 94729 DIFFUSING CAPACITY: CPT | Performed by: INTERNAL MEDICINE

## 2021-01-01 PROCEDURE — G0296 VISIT TO DETERM LDCT ELIG: HCPCS | Performed by: INTERNAL MEDICINE

## 2021-01-01 PROCEDURE — 99213 OFFICE O/P EST LOW 20 MIN: CPT | Mod: 95 | Performed by: INTERNAL MEDICINE

## 2021-01-01 PROCEDURE — G0008 ADMIN INFLUENZA VIRUS VAC: HCPCS | Performed by: INTERNAL MEDICINE

## 2021-01-01 PROCEDURE — 93000 ELECTROCARDIOGRAM COMPLETE: CPT | Performed by: INTERNAL MEDICINE

## 2021-01-01 PROCEDURE — 91300 PR COVID VAC PFIZER DIL RECON 30 MCG/0.3 ML IM: CPT

## 2021-01-01 PROCEDURE — 99214 OFFICE O/P EST MOD 30 MIN: CPT | Performed by: FAMILY MEDICINE

## 2021-01-01 PROCEDURE — 99214 OFFICE O/P EST MOD 30 MIN: CPT | Mod: 25 | Performed by: INTERNAL MEDICINE

## 2021-01-01 PROCEDURE — 93306 TTE W/DOPPLER COMPLETE: CPT | Performed by: STUDENT IN AN ORGANIZED HEALTH CARE EDUCATION/TRAINING PROGRAM

## 2021-01-01 PROCEDURE — 71046 X-RAY EXAM CHEST 2 VIEWS: CPT | Performed by: RADIOLOGY

## 2021-01-01 PROCEDURE — 80053 COMPREHEN METABOLIC PANEL: CPT | Performed by: INTERNAL MEDICINE

## 2021-01-01 PROCEDURE — 90682 RIV4 VACC RECOMBINANT DNA IM: CPT | Performed by: INTERNAL MEDICINE

## 2021-01-01 PROCEDURE — 96127 BRIEF EMOTIONAL/BEHAV ASSMT: CPT | Performed by: INTERNAL MEDICINE

## 2021-01-01 PROCEDURE — 36415 COLL VENOUS BLD VENIPUNCTURE: CPT | Performed by: INTERNAL MEDICINE

## 2021-01-01 PROCEDURE — 91300 COVID-19,PF,PFIZER (12+ YRS): CPT | Performed by: FAMILY MEDICINE

## 2021-01-01 PROCEDURE — 0004A COVID-19,PF,PFIZER (12+ YRS): CPT | Performed by: FAMILY MEDICINE

## 2021-01-01 PROCEDURE — 0002A PR COVID VAC PFIZER DIL RECON 30 MCG/0.3 ML IM: CPT

## 2021-01-01 RX ORDER — PREDNISONE 20 MG/1
40 TABLET ORAL DAILY
Qty: 10 TABLET | Refills: 0 | Status: SHIPPED | OUTPATIENT
Start: 2021-01-01 | End: 2021-01-01

## 2021-01-01 RX ORDER — FENTANYL 100 UG/1
1 PATCH TRANSDERMAL
Qty: 10 PATCH | Refills: 0 | Status: SHIPPED | OUTPATIENT
Start: 2021-01-01 | End: 2021-01-01

## 2021-01-01 RX ORDER — AMMONIUM LACTATE 12 G/100G
CREAM TOPICAL
COMMUNITY
Start: 2021-01-01

## 2021-01-01 RX ORDER — FENTANYL 100 UG/1
1 PATCH TRANSDERMAL
Qty: 10 PATCH | Refills: 0 | Status: SHIPPED | OUTPATIENT
Start: 2021-01-01 | End: 2022-01-01

## 2021-01-01 RX ORDER — HYDROCODONE BITARTRATE AND ACETAMINOPHEN 10; 325 MG/1; MG/1
1-2 TABLET ORAL EVERY 6 HOURS PRN
Qty: 150 TABLET | Refills: 0 | Status: CANCELLED | OUTPATIENT
Start: 2021-01-01

## 2021-01-01 RX ORDER — HYDROCODONE BITARTRATE AND ACETAMINOPHEN 10; 325 MG/1; MG/1
1-2 TABLET ORAL EVERY 6 HOURS PRN
Qty: 150 TABLET | Refills: 0 | Status: SHIPPED | OUTPATIENT
Start: 2021-01-01 | End: 2021-01-01

## 2021-01-01 RX ORDER — FENTANYL 100 UG/1
1 PATCH TRANSDERMAL
Qty: 10 PATCH | Refills: 0 | Status: CANCELLED | OUTPATIENT
Start: 2021-01-01

## 2021-01-01 RX ORDER — GUAIFENESIN 600 MG/1
1200 TABLET, EXTENDED RELEASE ORAL 2 TIMES DAILY
Qty: 120 TABLET | Refills: 3 | Status: SHIPPED | OUTPATIENT
Start: 2021-01-01 | End: 2022-01-01

## 2021-01-01 RX ORDER — ALBUTEROL SULFATE 90 UG/1
2 AEROSOL, METERED RESPIRATORY (INHALATION) EVERY 6 HOURS PRN
Qty: 18 G | Refills: 11 | Status: SHIPPED | OUTPATIENT
Start: 2021-01-01 | End: 2022-01-01

## 2021-01-01 RX ORDER — FLUTICASONE PROPIONATE 50 MCG
1 SPRAY, SUSPENSION (ML) NASAL DAILY
Qty: 16 G | Refills: 11 | Status: SHIPPED | OUTPATIENT
Start: 2021-01-01 | End: 2022-01-01

## 2021-01-01 RX ORDER — AZITHROMYCIN 250 MG/1
TABLET, FILM COATED ORAL
Qty: 6 TABLET | Refills: 0 | Status: SHIPPED | OUTPATIENT
Start: 2021-01-01 | End: 2021-01-01

## 2021-01-01 RX ORDER — TRAZODONE HYDROCHLORIDE 100 MG/1
100-200 TABLET ORAL
Qty: 30 TABLET | Refills: 1 | Status: SHIPPED | OUTPATIENT
Start: 2021-01-01 | End: 2021-01-01

## 2021-01-01 RX ORDER — BUPROPION HYDROCHLORIDE 150 MG/1
TABLET, FILM COATED, EXTENDED RELEASE ORAL
Qty: 60 TABLET | Refills: 2 | Status: SHIPPED | OUTPATIENT
Start: 2021-01-01 | End: 2021-01-01

## 2021-01-01 RX ORDER — AZITHROMYCIN 250 MG/1
500 TABLET, FILM COATED ORAL
Qty: 24 TABLET | Refills: 3 | Status: SHIPPED | OUTPATIENT
Start: 2021-01-01 | End: 2022-01-01

## 2021-01-01 RX ORDER — BUPROPION HYDROCHLORIDE 150 MG/1
TABLET, FILM COATED, EXTENDED RELEASE ORAL
Qty: 180 TABLET | Refills: 0 | Status: SHIPPED | OUTPATIENT
Start: 2021-01-01 | End: 2022-01-01

## 2021-01-01 RX ORDER — NYSTATIN 100000/ML
500000 SUSPENSION, ORAL (FINAL DOSE FORM) ORAL 4 TIMES DAILY
Qty: 200 ML | Refills: 0 | Status: SHIPPED | OUTPATIENT
Start: 2021-01-01 | End: 2021-01-01

## 2021-01-01 RX ORDER — HYDROCODONE BITARTRATE AND ACETAMINOPHEN 10; 325 MG/1; MG/1
1-2 TABLET ORAL EVERY 6 HOURS PRN
Qty: 150 TABLET | Refills: 0 | Status: SHIPPED | OUTPATIENT
Start: 2021-01-01 | End: 2022-01-01

## 2021-01-01 RX ORDER — FLUTICASONE PROPIONATE AND SALMETEROL XINAFOATE 230; 21 UG/1; UG/1
2 AEROSOL, METERED RESPIRATORY (INHALATION) 2 TIMES DAILY
Qty: 12 G | Refills: 11 | Status: SHIPPED | OUTPATIENT
Start: 2021-01-01 | End: 2022-01-01

## 2021-01-01 ASSESSMENT — ANXIETY QUESTIONNAIRES
1. FEELING NERVOUS, ANXIOUS, OR ON EDGE: NOT AT ALL
5. BEING SO RESTLESS THAT IT IS HARD TO SIT STILL: NOT AT ALL
GAD7 TOTAL SCORE: 1
3. WORRYING TOO MUCH ABOUT DIFFERENT THINGS: NOT AT ALL
GAD7 TOTAL SCORE: 1
2. NOT BEING ABLE TO STOP OR CONTROL WORRYING: NOT AT ALL
7. FEELING AFRAID AS IF SOMETHING AWFUL MIGHT HAPPEN: NOT AT ALL
6. BECOMING EASILY ANNOYED OR IRRITABLE: SEVERAL DAYS

## 2021-01-01 ASSESSMENT — MIFFLIN-ST. JEOR
SCORE: 1352.92
SCORE: 1384.21
SCORE: 1371.74

## 2021-01-01 ASSESSMENT — PAIN SCALES - GENERAL
PAINLEVEL: NO PAIN (0)
PAINLEVEL: NO PAIN (0)
PAINLEVEL: SEVERE PAIN (7)

## 2021-01-01 ASSESSMENT — PATIENT HEALTH QUESTIONNAIRE - PHQ9
5. POOR APPETITE OR OVEREATING: NOT AT ALL
SUM OF ALL RESPONSES TO PHQ QUESTIONS 1-9: 7

## 2021-01-04 ENCOUNTER — TELEPHONE (OUTPATIENT)
Dept: PEDIATRICS | Facility: CLINIC | Age: 56
End: 2021-01-04

## 2021-01-04 ENCOUNTER — MYC MEDICAL ADVICE (OUTPATIENT)
Dept: PEDIATRICS | Facility: CLINIC | Age: 56
End: 2021-01-04

## 2021-01-04 NOTE — TELEPHONE ENCOUNTER
[facility] Call Center    Phone Message    May a detailed message be left on voicemail: yes     Reason for Call: Form or Letter   Type or form/letter needing completion: Progress Report  Provider: Dr Topete  Date form needed: 01/15/2021  Once completed: Patient will  at .      Action Taken: Other: NA    Travel Screening: Negative

## 2021-01-06 NOTE — TELEPHONE ENCOUNTER
Form received, placed on Ho's desk and documented in 1/4/21 phone message. Duplicate message. Ilene VASQUEZ

## 2021-01-06 NOTE — TELEPHONE ENCOUNTER
[facility] CLINICAL DOCUMENTATION    Form Documentation Form or Letter Request    Type or form/letter needing completion: [facility] [place]  Provider: Ho  Has provider seen patient for office visit related to reason for form request? Yes  Date form needed:1-  Once completed: Patient will  at .  Dave will

## 2021-01-10 ENCOUNTER — HEALTH MAINTENANCE LETTER (OUTPATIENT)
Age: 56
End: 2021-01-10

## 2021-01-15 ENCOUNTER — MYC REFILL (OUTPATIENT)
Dept: PEDIATRICS | Facility: CLINIC | Age: 56
End: 2021-01-15

## 2021-01-15 DIAGNOSIS — M43.22 FUSION OF SPINE OF CERVICAL REGION: ICD-10-CM

## 2021-01-15 DIAGNOSIS — G89.29 CHRONIC NECK PAIN: ICD-10-CM

## 2021-01-15 DIAGNOSIS — Z98.1 STATUS POST CERVICAL SPINAL ARTHRODESIS: ICD-10-CM

## 2021-01-15 DIAGNOSIS — M54.2 CHRONIC NECK PAIN: ICD-10-CM

## 2021-01-15 DIAGNOSIS — Z79.891 LONG TERM CURRENT USE OF OPIATE ANALGESIC: ICD-10-CM

## 2021-01-18 NOTE — TELEPHONE ENCOUNTER
Routing refill request to provider for review/approval because:  Drug not on the [facility] refill protocol   Unable to run  as this RN is not a delegate.      Evita Cam RN, BSN, PHN

## 2021-01-19 DIAGNOSIS — J43.9 PULMONARY EMPHYSEMA, UNSPECIFIED EMPHYSEMA TYPE (H): ICD-10-CM

## 2021-01-19 RX ORDER — FENTANYL 100 UG/1
1 PATCH TRANSDERMAL
Qty: 10 PATCH | Refills: 0 | Status: SHIPPED | OUTPATIENT
Start: 2021-01-19 | End: 2021-02-16

## 2021-01-19 RX ORDER — HYDROCODONE BITARTRATE AND ACETAMINOPHEN 10; 325 MG/1; MG/1
1 TABLET ORAL EVERY 4 HOURS PRN
Qty: 180 TABLET | Refills: 0 | Status: SHIPPED | OUTPATIENT
Start: 2021-01-19 | End: 2021-02-16

## 2021-01-19 NOTE — TELEPHONE ENCOUNTER
Medication:     mometasone-formoterol 200-5 MCG/ACT IN inhaler 1 Inhaler 11 2/20/2020  No   Sig - Route: Inhale 2 puffs into the lungs 2 times daily - Inhalation     Date last written: 02/20/2020  Dispensed amount: 1 inhaler  Refills: 11  Date last dispensed: Unknown      Pt's last office visit: 02/20/2020  Next scheduled office visit: Unknown      Per the RN/LPN medication refill protocol, writer is  to refill this request.     (If able to refill, please call the pt to inform them the RX was sent to the pharmacy. If unable to refill, route this encounter to the prescribing physician for authorization or further instructions)

## 2021-02-11 DIAGNOSIS — M62.838 MUSCLE SPASM: ICD-10-CM

## 2021-02-12 NOTE — TELEPHONE ENCOUNTER
Routing refill request to provider for review/approval because:  Drug not on the [facility] refill protocol     Evita Cam RN, BSN, PHN

## 2021-02-16 ENCOUNTER — MYC REFILL (OUTPATIENT)
Dept: PEDIATRICS | Facility: CLINIC | Age: 56
End: 2021-02-16

## 2021-02-16 DIAGNOSIS — G89.29 CHRONIC NECK PAIN: ICD-10-CM

## 2021-02-16 DIAGNOSIS — Z98.1 STATUS POST CERVICAL SPINAL ARTHRODESIS: ICD-10-CM

## 2021-02-16 DIAGNOSIS — Z79.891 LONG TERM CURRENT USE OF OPIATE ANALGESIC: ICD-10-CM

## 2021-02-16 DIAGNOSIS — M54.2 CHRONIC NECK PAIN: ICD-10-CM

## 2021-02-16 DIAGNOSIS — M43.22 FUSION OF SPINE OF CERVICAL REGION: ICD-10-CM

## 2021-02-17 RX ORDER — FENTANYL 100 UG/1
1 PATCH TRANSDERMAL
Qty: 10 PATCH | Refills: 0 | Status: SHIPPED | OUTPATIENT
Start: 2021-02-19 | End: 2021-03-23

## 2021-02-17 RX ORDER — HYDROCODONE BITARTRATE AND ACETAMINOPHEN 10; 325 MG/1; MG/1
1 TABLET ORAL EVERY 4 HOURS PRN
Qty: 180 TABLET | Refills: 0 | Status: SHIPPED | OUTPATIENT
Start: 2021-02-19 | End: 2021-03-23

## 2021-02-18 ENCOUNTER — VIRTUAL VISIT (OUTPATIENT)
Dept: PULMONOLOGY | Facility: CLINIC | Age: 56
End: 2021-02-18
Payer: COMMERCIAL

## 2021-02-18 DIAGNOSIS — R91.8 PULMONARY NODULES: ICD-10-CM

## 2021-02-18 DIAGNOSIS — R53.83 FATIGUE, UNSPECIFIED TYPE: ICD-10-CM

## 2021-02-18 DIAGNOSIS — R09.81 NASAL CONGESTION: ICD-10-CM

## 2021-02-18 DIAGNOSIS — J44.9 CHRONIC OBSTRUCTIVE PULMONARY DISEASE, UNSPECIFIED COPD TYPE (H): ICD-10-CM

## 2021-02-18 DIAGNOSIS — J43.9 PULMONARY EMPHYSEMA, UNSPECIFIED EMPHYSEMA TYPE (H): Primary | ICD-10-CM

## 2021-02-18 PROCEDURE — 99214 OFFICE O/P EST MOD 30 MIN: CPT | Mod: 95 | Performed by: INTERNAL MEDICINE

## 2021-02-18 RX ORDER — FLUTICASONE PROPIONATE 50 MCG
1 SPRAY, SUSPENSION (ML) NASAL DAILY
Qty: 16 G | Refills: 3 | Status: SHIPPED | OUTPATIENT
Start: 2021-02-18 | End: 2021-01-01

## 2021-02-18 NOTE — PROGRESS NOTES
Jaimee is a 55 year old who is being evaluated via a billable video visit.      Video visit - patient will login to My Chart account. If patient is not logged in at 11:30, please call: [phone] (H)    How would you like to obtain your AVS? MyChart  If the video visit is dropped, please call [phone] (H)    Will anyone else be joining your video visit? No       Rodriguez Santos LPN  Pulmonary Division:  [facility]  Phone: [phone] Fax: [phone]    Video-Visit Details      Video Start Time: 11:35 AM      Type of service:  Video Visit    Video End Time:11:49 AM    Originating Location (pt. Location): Home    Distant Location (provider location):  [facility]     Platform used for Video Visit: 4Cable TV       Reason for Visit  Jaimee Rodriguez is a 55 year old year old female who is being seen for RECHECK (Pulmonary emphysema follow up - PFT cancelled as pt does not feel safe coming to clinic. c/o nasal dryness when increasing O2 from 2-4L, increased SOB w/ activity and increased fatigue. Follow up gen pulm?)    ILD HPI    Jaimee Rodriguez is a 55-year-old female who was evaluated today via virtual video visit for follow-up of COPD.  She has significant COPD with chronic home oxygen therapy.  Overall she reports that she has not been feeling well.  I have not seen her for 1 year.  She feels her shortness of breath is increased she is coughing more and she is requiring more oxygen.  She feels this has been the case for the past couple of months.  She denies fevers chills night sweats or significant productive cough.  She has been mostly in the home and has does not have any known exposures to Covid.  Otherwise no other new complaints today.      Current Outpatient Medications   Medication     albuterol (PROAIR HFA/PROVENTIL HFA/VENTOLIN HFA) 108 (90 Base) MCG/ACT inhaler     diclofenac (VOLTAREN) 1 % topical gel     [START ON 2/19/2021] fentaNYL (DURAGESIC) 100 mcg/hr 72  hr patch     fluticasone (FLONASE) 50 MCG/ACT nasal spray     [START ON 2021] HYDROcodone-acetaminophen (NORCO)  MG per tablet     mometasone-formoterol (DULERA) 200-5 MCG/ACT inhaler     naloxone (EVZIO) 0.4 MG/0.4ML auto-injector     naloxone (NARCAN) 4 MG/0.1ML nasal spray     order for DME     ORDER FOR DME     tiotropium 2.5 MCG/ACT IN inhaler     tiZANidine (ZANAFLEX) 4 MG tablet     traZODone (DESYREL) 100 MG tablet     Naloxone HCl (EVZIO) 2 MG/0.4ML SOAJ     No current facility-administered medications for this visit.      Allergies   Allergen Reactions     Morphine      Past Medical History:   Diagnosis Date     Cervical spondylosis without myelopathy     With radiculopathy.     Contact dermatitis and other eczema, due to unspecified cause     eczema     Endometriosis, site unspecified     R uterosacral ligament/surgically removed     Irregular menstrual cycle      NONSPECIFIC MEDICAL HISTORY     Mood swings, Irritability     Other acne      Other and unspecified ovarian cyst     sydni R     Tobacco use disorder 2001       Past Surgical History:   Procedure Laterality Date     C APPENDECTOMY       C  DELIVERY ONLY  ,,    , Low Cervical     C LIGATE FALLOPIAN TUBE      S/P tubal ligation     C TOTAL ABDOM HYSTERECTOMY      not total     pt did not have ovaries removed     HC ENLARGE BREAST WITH IMPLANT       SURGICAL HISTORY OF -   10/18/2005    C6 corpectomy with iliac crest autograft fixation. [facility]       Social History     Socioeconomic History     Marital status:      Spouse name: Not on file     Number of children: Not on file     Years of education: Not on file     Highest education level: Not on file   Occupational History     Not on file   Social Needs     Financial resource strain: Not on file     Food insecurity     Worry: Not on file     Inability: Not on file     Transportation needs     Medical: Not on file      Non-medical: Not on file   Tobacco Use     Smoking status: Current Every Day Smoker     Packs/day: 0.75     Years: 40.00     Pack years: 30.00     Types: Cigarettes     Smokeless tobacco: Never Used     Tobacco comment: started age 14.   Substance and Sexual Activity     Alcohol use: Yes     Alcohol/week: 0.0 standard drinks     Comment: occ     Drug use: No     Sexual activity: Yes     Partners: Male     Birth control/protection: Surgical   Lifestyle     Physical activity     Days per week: Not on file     Minutes per session: Not on file     Stress: Not on file   Relationships     Social connections     Talks on phone: Not on file     Gets together: Not on file     Attends Synagogue service: Not on file     Active member of club or organization: Not on file     Attends meetings of clubs or organizations: Not on file     Relationship status: Not on file     Intimate partner violence     Fear of current or ex partner: Not on file     Emotionally abused: Not on file     Physically abused: Not on file     Forced sexual activity: Not on file   Other Topics Concern      Service No     Blood Transfusions No     Caffeine Concern No     Occupational Exposure No     Hobby Hazards No     Sleep Concern Yes     Stress Concern No     Weight Concern No     Special Diet No     Comment: not a milk drinker     Back Care No     Exercise No     Comment: walks at work     Bike Helmet Not Asked     Seat Belt Yes     Self-Exams No     Parent/sibling w/ CABG, MI or angioplasty before 65F 55M? Not Asked   Social History Narrative     Not on file       Family History   Problem Relation Age of Onset     Cerebrovascular Disease Mother      Chronic Obstructive Pulmonary Disease Mother      Arthritis Father      Diabetes Maternal Grandmother      Cancer Maternal Aunt         breast       ROS Pulmonary    A complete ROS was otherwise negative except as noted in the HPI.  Constitutional - looks well, in no apparent distress  Eyes - no  redness or discharge  Respiratory -breathing appears comfortable.  No cough. Speaking in full sentences.  Skin - No appreciable discoloration or lesions (very limited exam)  Neurological - No apparent tremors. Speech fluent and articlate  Psychiatric - no signs of delirium or anxiety     Exam limited to that easily identified on a virtual visit. The rest of a comprehensive physical examination is deferred due to PHE (public health emergency) video visit restrictions.    Results: I have reviewed all imaging, PFTs and other relavent tests, please see below for details, PFT and imaging results were reviewed with the patient.    Assessment and plan:    55-year-old female with COPD and worsening symptoms.  I do think we need to have her evaluated with pulmonary function tests and a CT scan she does also have a history of pulmonary nodules that need to be followed up.  We will also check some labs due to her complaints of low energy.  I will follow up on these test results.  If there is evidence of worsening of her lung disease we can consider a course of prednisone to see if this helps.  If there is no significant evidence of progression then would consider referral to PCP for evaluation of her fatigue.  We will follow-up with the patient after these tests are done.      CBC   Recent Labs   Lab Test 04/08/19  1017 07/05/18  1733   RBC 3.79* 4.79   HGB 11.2* 14.1   HCT 33.7* 43.8    284       Basic Metabolic Panel  Recent Labs   Lab Test 06/20/19  0809 04/08/19  1017    135   POTASSIUM 4.2 3.8   CHLORIDE 101 99   CO2 32 30   BUN 19 27   GLC 99 100*   JOSEPH 9.4 8.8       INR  No results for input(s): INR in the last 11113 hours.    PFT  PFT Latest Ref Rng & Units 2/20/2020   FVC L 2.11   FEV1 L 0.73   FVC% % 67   FEV1% % 29           CC:

## 2021-03-16 ENCOUNTER — MYC REFILL (OUTPATIENT)
Dept: PEDIATRICS | Facility: CLINIC | Age: 56
End: 2021-03-16

## 2021-03-16 DIAGNOSIS — M43.22 FUSION OF SPINE OF CERVICAL REGION: ICD-10-CM

## 2021-03-16 DIAGNOSIS — M62.838 MUSCLE SPASM: ICD-10-CM

## 2021-03-16 DIAGNOSIS — M54.2 CHRONIC NECK PAIN: ICD-10-CM

## 2021-03-16 DIAGNOSIS — Z79.891 LONG TERM CURRENT USE OF OPIATE ANALGESIC: ICD-10-CM

## 2021-03-16 DIAGNOSIS — G89.29 CHRONIC NECK PAIN: ICD-10-CM

## 2021-03-16 DIAGNOSIS — Z98.1 STATUS POST CERVICAL SPINAL ARTHRODESIS: ICD-10-CM

## 2021-03-16 RX ORDER — HYDROCODONE BITARTRATE AND ACETAMINOPHEN 10; 325 MG/1; MG/1
1 TABLET ORAL EVERY 4 HOURS PRN
Qty: 180 TABLET | Refills: 0 | Status: CANCELLED | OUTPATIENT
Start: 2021-03-16

## 2021-03-16 RX ORDER — FENTANYL 100 UG/1
1 PATCH TRANSDERMAL
Qty: 10 PATCH | Refills: 0 | Status: CANCELLED | OUTPATIENT
Start: 2021-03-16

## 2021-03-16 NOTE — TELEPHONE ENCOUNTER
Routing refill request to provider for review/approval because:  Drug not on the [facility] refill protocol     Kimmy LOPEZN, RN

## 2021-03-18 NOTE — TELEPHONE ENCOUNTER
Routing refill request to provider for review/approval because:  Drugs not on the [facility] refill protocol     Tennille Quiroga RN, [facility]

## 2021-03-18 NOTE — TELEPHONE ENCOUNTER
Appears overdue for OV per pain contract/chart but nothing scheduled. No red flags seen on PDMP site.   Will need to make appt with pcp for the refill, virtual is ok. Please schedule and then route back to provider pool to send refill      PDMP Review       Value Time User    State PDMP site checked  Yes 3/18/2021  4:29 PM Daisy Lawson MD

## 2021-03-19 NOTE — TELEPHONE ENCOUNTER
[employer] message was sent to the patient.    If patient calls back:   Schedule virtual appointment within 2 weeks with PCP, document that pt called and route to pool for juancarlos Gorman

## 2021-03-22 ENCOUNTER — ANCILLARY PROCEDURE (OUTPATIENT)
Dept: CT IMAGING | Facility: CLINIC | Age: 56
End: 2021-03-22
Payer: COMMERCIAL

## 2021-03-22 ENCOUNTER — OFFICE VISIT (OUTPATIENT)
Dept: NURSING | Facility: CLINIC | Age: 56
End: 2021-03-22
Payer: COMMERCIAL

## 2021-03-22 DIAGNOSIS — R53.83 FATIGUE, UNSPECIFIED TYPE: ICD-10-CM

## 2021-03-22 DIAGNOSIS — J44.9 CHRONIC OBSTRUCTIVE PULMONARY DISEASE, UNSPECIFIED COPD TYPE (H): ICD-10-CM

## 2021-03-22 DIAGNOSIS — J43.9 PULMONARY EMPHYSEMA, UNSPECIFIED EMPHYSEMA TYPE (H): ICD-10-CM

## 2021-03-22 DIAGNOSIS — R91.8 PULMONARY NODULES: ICD-10-CM

## 2021-03-22 LAB
6 MIN WALK (FT): 500 FT
6 MIN WALK (M): 152 M
ALBUMIN SERPL-MCNC: 3.8 G/DL (ref 3.4–5)
ALP SERPL-CCNC: 56 U/L (ref 40–150)
ALT SERPL W P-5'-P-CCNC: 22 U/L (ref 0–50)
ANION GAP SERPL CALCULATED.3IONS-SCNC: <1 MMOL/L (ref 3–14)
AST SERPL W P-5'-P-CCNC: 11 U/L (ref 0–45)
BASOPHILS # BLD AUTO: 0.1 10E9/L (ref 0–0.2)
BASOPHILS NFR BLD AUTO: 0.9 %
BILIRUB SERPL-MCNC: 0.3 MG/DL (ref 0.2–1.3)
BUN SERPL-MCNC: 18 MG/DL (ref 7–30)
CALCIUM SERPL-MCNC: 9.6 MG/DL (ref 8.5–10.1)
CHLORIDE SERPL-SCNC: 101 MMOL/L (ref 94–109)
CO2 SERPL-SCNC: 39 MMOL/L (ref 20–32)
CREAT SERPL-MCNC: 0.7 MG/DL (ref 0.52–1.04)
DIFFERENTIAL METHOD BLD: ABNORMAL
EOSINOPHIL # BLD AUTO: 0.3 10E9/L (ref 0–0.7)
EOSINOPHIL NFR BLD AUTO: 2.9 %
ERYTHROCYTE [DISTWIDTH] IN BLOOD BY AUTOMATED COUNT: 13.2 % (ref 10–15)
GFR SERPL CREATININE-BSD FRML MDRD: >90 ML/MIN/{1.73_M2}
GLUCOSE SERPL-MCNC: 105 MG/DL (ref 70–99)
HCT VFR BLD AUTO: 38.4 % (ref 35–47)
HGB BLD-MCNC: 11.6 G/DL (ref 11.7–15.7)
IMM GRANULOCYTES # BLD: 0 10E9/L (ref 0–0.4)
IMM GRANULOCYTES NFR BLD: 0.3 %
LYMPHOCYTES # BLD AUTO: 1.9 10E9/L (ref 0.8–5.3)
LYMPHOCYTES NFR BLD AUTO: 20.5 %
MCH RBC QN AUTO: 28.9 PG (ref 26.5–33)
MCHC RBC AUTO-ENTMCNC: 30.2 G/DL (ref 31.5–36.5)
MCV RBC AUTO: 96 FL (ref 78–100)
MONOCYTES # BLD AUTO: 0.8 10E9/L (ref 0–1.3)
MONOCYTES NFR BLD AUTO: 8.7 %
NEUTROPHILS # BLD AUTO: 6.2 10E9/L (ref 1.6–8.3)
NEUTROPHILS NFR BLD AUTO: 66.7 %
PLATELET # BLD AUTO: 258 10E9/L (ref 150–450)
POTASSIUM SERPL-SCNC: 4 MMOL/L (ref 3.4–5.3)
PROT SERPL-MCNC: 7.4 G/DL (ref 6.8–8.8)
RBC # BLD AUTO: 4.02 10E12/L (ref 3.8–5.2)
SODIUM SERPL-SCNC: 139 MMOL/L (ref 133–144)
TSH SERPL DL<=0.005 MIU/L-ACNC: 3.49 MU/L (ref 0.4–4)
WBC # BLD AUTO: 9.2 10E9/L (ref 4–11)

## 2021-03-22 PROCEDURE — 82607 VITAMIN B-12: CPT | Performed by: INTERNAL MEDICINE

## 2021-03-22 PROCEDURE — 80053 COMPREHEN METABOLIC PANEL: CPT | Performed by: INTERNAL MEDICINE

## 2021-03-22 PROCEDURE — 71250 CT THORAX DX C-: CPT | Performed by: RADIOLOGY

## 2021-03-22 PROCEDURE — 94618 PULMONARY STRESS TESTING: CPT | Performed by: INTERNAL MEDICINE

## 2021-03-22 PROCEDURE — 83540 ASSAY OF IRON: CPT | Performed by: INTERNAL MEDICINE

## 2021-03-22 PROCEDURE — 83550 IRON BINDING TEST: CPT | Performed by: INTERNAL MEDICINE

## 2021-03-22 PROCEDURE — 82728 ASSAY OF FERRITIN: CPT | Performed by: INTERNAL MEDICINE

## 2021-03-22 PROCEDURE — 36415 COLL VENOUS BLD VENIPUNCTURE: CPT | Performed by: INTERNAL MEDICINE

## 2021-03-22 PROCEDURE — 84443 ASSAY THYROID STIM HORMONE: CPT | Performed by: INTERNAL MEDICINE

## 2021-03-22 PROCEDURE — 85025 COMPLETE CBC W/AUTO DIFF WBC: CPT | Performed by: INTERNAL MEDICINE

## 2021-03-22 NOTE — PROGRESS NOTES
PFT Lab Note: 6 minute walk done per Dr. Perlman.    Unable to complete any additional PFT testing today due to software malfunction - pt will reschedule PFT portion for another day.

## 2021-03-23 ENCOUNTER — TELEPHONE (OUTPATIENT)
Dept: FAMILY MEDICINE | Facility: CLINIC | Age: 56
End: 2021-03-23

## 2021-03-23 ENCOUNTER — VIRTUAL VISIT (OUTPATIENT)
Dept: FAMILY MEDICINE | Facility: CLINIC | Age: 56
End: 2021-03-23
Payer: COMMERCIAL

## 2021-03-23 DIAGNOSIS — F33.0 MAJOR DEPRESSIVE DISORDER, RECURRENT EPISODE, MILD (H): ICD-10-CM

## 2021-03-23 DIAGNOSIS — Z98.1 STATUS POST CERVICAL SPINAL ARTHRODESIS: ICD-10-CM

## 2021-03-23 DIAGNOSIS — F51.04 CHRONIC INSOMNIA: ICD-10-CM

## 2021-03-23 DIAGNOSIS — Z99.81 O2 DEPENDENT: ICD-10-CM

## 2021-03-23 DIAGNOSIS — G89.4 CHRONIC PAIN SYNDROME: Primary | ICD-10-CM

## 2021-03-23 DIAGNOSIS — M54.2 CHRONIC NECK PAIN: ICD-10-CM

## 2021-03-23 DIAGNOSIS — Z79.891 LONG TERM CURRENT USE OF OPIATE ANALGESIC: ICD-10-CM

## 2021-03-23 DIAGNOSIS — M62.838 MUSCLE SPASM: ICD-10-CM

## 2021-03-23 DIAGNOSIS — R19.5 POSITIVE FIT (FECAL IMMUNOCHEMICAL TEST): ICD-10-CM

## 2021-03-23 DIAGNOSIS — F17.209 TOBACCO USE DISORDER, CONTINUOUS: ICD-10-CM

## 2021-03-23 DIAGNOSIS — J44.9 COPD, SEVERE (H): ICD-10-CM

## 2021-03-23 DIAGNOSIS — M43.22 FUSION OF SPINE OF CERVICAL REGION: ICD-10-CM

## 2021-03-23 DIAGNOSIS — I10 HYPERTENSION GOAL BP (BLOOD PRESSURE) < 140/90: ICD-10-CM

## 2021-03-23 DIAGNOSIS — G89.29 CHRONIC NECK PAIN: ICD-10-CM

## 2021-03-23 DIAGNOSIS — D64.9 ANEMIA, UNSPECIFIED TYPE: ICD-10-CM

## 2021-03-23 LAB
FERRITIN SERPL-MCNC: 70 NG/ML (ref 8–252)
IRON SATN MFR SERPL: 17 % (ref 15–46)
IRON SERPL-MCNC: 57 UG/DL (ref 35–180)
TIBC SERPL-MCNC: 342 UG/DL (ref 240–430)
VIT B12 SERPL-MCNC: 205 PG/ML (ref 193–986)

## 2021-03-23 PROCEDURE — 96127 BRIEF EMOTIONAL/BEHAV ASSMT: CPT | Performed by: INTERNAL MEDICINE

## 2021-03-23 PROCEDURE — 99215 OFFICE O/P EST HI 40 MIN: CPT | Mod: 95 | Performed by: INTERNAL MEDICINE

## 2021-03-23 RX ORDER — BUPROPION HYDROCHLORIDE 150 MG/1
150 TABLET, FILM COATED, EXTENDED RELEASE ORAL 2 TIMES DAILY
Qty: 60 TABLET | Refills: 2 | Status: SHIPPED | OUTPATIENT
Start: 2021-03-23 | End: 2021-01-01

## 2021-03-23 RX ORDER — HYDROCODONE BITARTRATE AND ACETAMINOPHEN 10; 325 MG/1; MG/1
1-2 TABLET ORAL EVERY 6 HOURS PRN
Qty: 150 TABLET | Refills: 0 | Status: SHIPPED | OUTPATIENT
Start: 2021-03-23 | End: 2021-01-01

## 2021-03-23 RX ORDER — TRAZODONE HYDROCHLORIDE 100 MG/1
100-200 TABLET ORAL
Qty: 90 TABLET | Refills: 1 | COMMUNITY
Start: 2021-03-23 | End: 2021-01-01

## 2021-03-23 RX ORDER — FENTANYL 100 UG/1
1 PATCH TRANSDERMAL
Qty: 10 PATCH | Refills: 0 | Status: SHIPPED | OUTPATIENT
Start: 2021-03-23 | End: 2021-01-01

## 2021-03-23 ASSESSMENT — ANXIETY QUESTIONNAIRES
1. FEELING NERVOUS, ANXIOUS, OR ON EDGE: NOT AT ALL
3. WORRYING TOO MUCH ABOUT DIFFERENT THINGS: NOT AT ALL
6. BECOMING EASILY ANNOYED OR IRRITABLE: NOT AT ALL
5. BEING SO RESTLESS THAT IT IS HARD TO SIT STILL: NOT AT ALL
7. FEELING AFRAID AS IF SOMETHING AWFUL MIGHT HAPPEN: NOT AT ALL
GAD7 TOTAL SCORE: 0
IF YOU CHECKED OFF ANY PROBLEMS ON THIS QUESTIONNAIRE, HOW DIFFICULT HAVE THESE PROBLEMS MADE IT FOR YOU TO DO YOUR WORK, TAKE CARE OF THINGS AT HOME, OR GET ALONG WITH OTHER PEOPLE: NOT DIFFICULT AT ALL
2. NOT BEING ABLE TO STOP OR CONTROL WORRYING: NOT AT ALL

## 2021-03-23 ASSESSMENT — PATIENT HEALTH QUESTIONNAIRE - PHQ9
SUM OF ALL RESPONSES TO PHQ QUESTIONS 1-9: 6
5. POOR APPETITE OR OVEREATING: NOT AT ALL

## 2021-03-23 NOTE — TELEPHONE ENCOUNTER
Patient had a video visit with the provider today where the Controlled Substance agreement was discussed. She is asking how she will receive the form to review and sign please. Please call to advise.  Purvi Rodriguez TC

## 2021-03-23 NOTE — LETTER
Opioid / Opioid Plus Controlled Substance Agreement    This is an agreement between you and your provider about the safe and appropriate use of controlled substance/opioids prescribed by your care team. Controlled substances are medicines that can cause physical and mental dependence (abuse).    There are strict laws about having and using these medicines. We here at [facility] are committing to working with you in your efforts to get better. To support you in this work, we ll help you schedule regular office appointments for medicine refills. If we must cancel or change your appointment for any reason, we ll make sure you have enough medicine to last until your next appointment.     As a Provider, I will:    Listen carefully to your concerns and treat you with respect.     Recommend a treatment plan that I believe is in your best interest. This plan may involve therapies other than opioid pain medication.     Talk with you often about the possible benefits, and the risk of harm of any medicine that we prescribe for you.     Provide a plan on how to taper (discontinue or go off) using this medicine if the decision is made to stop its use.    As a Patient, I understand that opioid(s):     Are a controlled substance prescribed by my care team to help me function or work and manage my condition(s).     Are strong medicines and can cause serious side effects such as:    Drowsiness, which can seriously affect my driving ability    A lower breathing rate, enough to cause death    Harm to my thinking ability     Depression     Abuse of and addiction to this medicine    Need to be taken exactly as prescribed. Combining opioids with certain medicines or chemicals (such as illegal drugs, sedatives, sleeping pills, and benzodiazepines) can be dangerous or even fatal. If I stop opioids suddenly, I may have severe withdrawal symptoms.    Do not work for all types of pain nor for all patients. If they re not helpful, I may  be asked to stop them.        The risks, benefits and side effects of these medicine(s) were explained to me. I agree that:  1. I will take part in other treatments as advised by my care team. This may be psychiatry or counseling, physical therapy, behavioral therapy, group treatment or a referral to a specialist.     2. I will keep all my appointments. I understand that this is part of the monitoring of opioids. My care team may require an office visit for EVERY opioid/controlled substance refill. If I miss appointments or don t follow instructions, my care team may stop my medicine.    3. I will take my medicines as prescribed. I will not change the dose or schedule unless my care team tells me to. There will be no refills if I run out early.     4. I may be asked to come to the clinic and complete a urine drug test or complete a pill count at any time. If I don t give a urine sample or participate in a pill count, the care team may stop my medicine.    5. I will only receive prescriptions from this clinic for chronic pain. If I am treated by another provider for acute pain issues, I will tell them that I am taking opioid pain medication for chronic pain and that I have a treatment agreement with this provider. I will inform my [facility] care team within one business day if I am given a prescription for any pain medication by another healthcare provider. My [facility] care team can contact other providers and pharmacists about my use of any medicines.    6. It is up to me to make sure that I don t run out of my medicines on weekends or holidays. If my care team is willing to refill my opioid prescription without a visit, I must request refills only during office hours. Refills may take up to 3 business days to process. I will use one pharmacy to fill all my opioid and other controlled substance prescriptions. I will notify the clinic about any changes to my insurance or medication  availability.    7. I am responsible for my prescriptions. If the medicine/prescription is lost, stolen or destroyed, it will not be replaced. I also agree not to share controlled substance medicines with anyone.    8. I am aware I should not use any illegal or recreational drugs. I agree not to drink alcohol unless my care team says I can.       9. If I enroll in the [place] Medical Cannabis program, I will tell my care team prior to my next refill.     10. I will tell my care team right away if I become pregnant, have a new medical problem treated outside of my regular clinic, or have a change in my medications.    11. I understand that this medicine can affect my thinking, judgment and reaction time. Alcohol and drugs affect the brain and body, which can affect the safety of my driving. Being under the influence of alcohol or drugs can affect my decision-making, behaviors, personal safety, and the safety of others. Driving while impaired (DWI) can occur if a person is driving, operating, or in physical control of a car, motorcycle, boat, snowmobile, ATV, motorbike, off-road vehicle, or any other motor vehicle ([place] Statute 169A.20). I understand the risk if I choose to drive or operate any vehicle or machinery.    I understand that if I do not follow any of the conditions above, my prescriptions or treatment may be stopped or changed.          Opioids  What You Need to Know    What are opioids?   Opioids are pain medicines that must be prescribed by a doctor. They are also known as narcotics.     Examples are:   1. morphine (MS Contin, Modesta)  2. oxycodone (Oxycontin)  3. oxycodone and acetaminophen (Percocet)  4. hydrocodone and acetaminophen (Vicodin, Norco)   5. fentanyl patch (Duragesic)   6. hydromorphone (Dilaudid)   7. methadone  8. codeine (Tylenol #3)     What do opioids do well?   Opioids are best for severe short-term pain such as after a surgery or injury. They may work well for cancer pain. They may  help some people with long-lasting (chronic) pain.     What do opioids NOT do well?   Opioids never get rid of pain entirely, and they don t work well for most patients with chronic pain. Opioids don t reduce swelling, one of the causes of pain.                                    Other ways to manage chronic pain and improve function include:       Treat the health problem that may be causing pain    Anti-inflammation medicines, which reduce swelling and tenderness, such as ibuprofen (Advil, Motrin) or naproxen (Aleve)    Acetaminophen (Tylenol)    Antidepressants and anti-seizure medicines, especially for nerve pain    Topical treatments such as patches or creams    Injections or nerve blocks    Chiropractic or osteopathic treatment    Acupuncture, massage, deep breathing, meditation, visual imagery, aromatherapy    Use heat or ice at the pain site    Physical therapy     Exercise    Stop smoking    Take part in therapy       Risks and side effects     Talk to your doctor before you start or decide to keep taking opioids. Possible side effects include:      Lowering your breathing rate enough to cause death    Overdose, including death, especially if taking higher than prescribed doses    Worse depression symptoms; less pleasure in things you usually enjoy    Feeling tired or sluggish    Slower thoughts or cloudy thinking    Being more sensitive to pain over time; pain is harder to control    Trouble sleeping or restless sleep    Changes in hormone levels (for example, less testosterone)    Changes in sex drive or ability to have sex    Constipation    Unsafe driving    Itching and sweating    Dizziness    Nausea, throwing up and dry mouth    What else should I know about opioids?    Opioids may lead to dependence, tolerance, or addiction.      Dependence means that if you stop or reduce the medicine too quickly, you will have withdrawal symptoms. These include loose poop (diarrhea), jitters, flu-like symptoms,  nervousness and tremors. Dependence is not the same as addiction.                       Tolerance means needing higher doses over time to get the same effect. This may increase the chance of serious side effects.      Addiction is when people improperly use a substance that harms their body, their mind or their relations with others. Use of opiates can cause a relapse of addiction if you have a history of drug or alcohol abuse.      People who have used opioids for a long time may have a lower quality of life, worse depression, higher levels of pain and more visits to doctors.    You can overdose on opioids. Take these steps to lower your risk of overdose:    1. Recognize the signs:  Signs of overdose include decrease or loss of consciousness (blackout), slowed breathing, trouble waking up and blue lips. If someone is worried about overdose, they should call 911.    2. Talk to your doctor about Narcan (naloxone).   If you are at risk for overdose, you may be given a prescription for Narcan. This medicine very quickly reverses the effects of opioids.   If you overdose, a friend or family member can give you Narcan while waiting for the ambulance. They need to know the signs of overdose and how to give Narcan.     3. Don't use alcohol or street drugs.   Taking them with opioids can cause death.    4. Do not take any of these medicines unless your doctor says it s OK. Taking these with opioids can cause death:    Benzodiazepines, such as lorazepam (Ativan), alprazolam (Xanax) or diazepam (Valium)    Muscle relaxers, such as cyclobenzaprine (Flexeril)    Sleeping pills like zolpidem (Ambien)     Other opioids      How to keep you and other people safe while taking opioids:    1. Never share your opioids with others.  Opioid medicines are regulated by the Drug Enforcement Agency ([employer]). Selling or sharing medications is a criminal act.    2. Be sure to store opioids in a secure place, locked up if possible. Young children  can easily swallow them and overdose.    3. When you are traveling with your medicines, keep them in the original bottles. If you use a pill box, be sure you also carry a copy of your medicine list from your clinic or pharmacy.    4. Safe disposal of opioids    Most pharmacies have places to get rid of medicine, called disposal kiosks. Medicine disposal options are also available in every [facility]. Search your county and  medication disposal  to find more options. You can find more details at:  [email or web address]-[email or web address]-[email or web address]-medications     I agree that my provider, clinic care team, and pharmacy may work with any city, state or federal law enforcement agency that investigates the misuse, sale, or other diversion of my controlled medicine. I will allow my provider to discuss my care with, or share a copy of, this agreement with any other treating provider, pharmacy or emergency room where I receive care.    I have read this agreement and have asked questions about anything I did not understand.    _______________________________________________________  Patient Signature - Jaimee Rodriguez _____________________                   Date     _______________________________________________________  Provider Signature - Maki Topete MD PhD   _____________________                   Date     _______________________________________________________  Witness Signature (required if provider not present while patient signing)   _____________________                   Date

## 2021-03-23 NOTE — PROGRESS NOTES
Jaimee is a 55 year old who is being evaluated via a billable video visit.      How would you like to obtain your AVS? MyChart  If the video visit is dropped, the invitation should be resent by: Text to cell phone: Doximity via cell phone  Will anyone else be joining your video visit? No     Video Start Time: 2:57 PM    Assessment & Plan     Diagnoses and all orders for this visit:    Chronic pain syndrome  -     Drug Confirmation Panel Urine with Creat; Future  -     MO BEHAV ASSMT W/SCORE & DOCD/STAND INSTRUMENT    Hypertension goal BP (blood pressure) < 140/90  -     Albumin Random Urine Quantitative with Creat Ratio; Future    Major depressive disorder, recurrent episode, mild (H)    Anemia, unspecified type  -     Iron and iron binding capacity  -     Ferritin  -     Vitamin B12    Tobacco use disorder, continuous  -     buPROPion (ZYBAN) 150 MG 12 hr tablet; Take 1 tablet (150 mg) by mouth 2 times daily    Chronic neck pain  -     HYDROcodone-acetaminophen (NORCO)  MG per tablet; Take 1-2 tablets by mouth every 6 hours as needed for moderate to severe pain  -     fentaNYL (DURAGESIC) 100 mcg/hr 72 hr patch; Place 1 patch onto the skin every 72 hours (Mylan brand)    Fusion of spine of cervical region  -     HYDROcodone-acetaminophen (NORCO)  MG per tablet; Take 1-2 tablets by mouth every 6 hours as needed for moderate to severe pain  -     fentaNYL (DURAGESIC) 100 mcg/hr 72 hr patch; Place 1 patch onto the skin every 72 hours (Mylan brand)    Long term current use of opiate analgesic  -     HYDROcodone-acetaminophen (NORCO)  MG per tablet; Take 1-2 tablets by mouth every 6 hours as needed for moderate to severe pain  -     fentaNYL (DURAGESIC) 100 mcg/hr 72 hr patch; Place 1 patch onto the skin every 72 hours (Mylan brand)    Status post cervical spinal arthrodesis  -     HYDROcodone-acetaminophen (NORCO)  MG per tablet; Take 1-2 tablets by mouth every 6 hours as needed for moderate to  severe pain    Chronic insomnia    Muscle spasm    Positive FIT (fecal immunochemical test)  -     GASTROENTEROLOGY ADULT REF PROCEDURE ONLY; Future    COPD, severe (H)    O2 dependent         Chronic pain syndrome: Continue with fentanyl patch 100 mcg every 72 hours.  We will reduce the hydrocodone average 5 tablets/day.    Smoking cessation: Trial of Zyban    Patient will get urine drug screen done this Friday when she goes in for COVID-19 vaccine.    She will also sign you updated controlled medication agreement, return to the clinic when she comes in for vaccine.  Our clinic fax number has been provided.    Anemia: I added iron panel and B12 to evaluate cause.  Diagnostic colonoscopy ordered to be done at the [facility] given her severe COPD with oxygen dependence.    Tobacco Cessation:   reports that she has been smoking cigarettes. She has a 30.00 pack-year smoking history. She has never used smokeless tobacco.  Tobacco Cessation Action Plan: Pharmacotherapies : Zyban/Wellbutrin    See Patient Instructions    Return in about 3 months (around 6/23/2021) for wellness visit.    I spent a total of 42 minutes on the day of the visit.  doing chart review, history and exam, documentation and further activities as noted above      Maki Topete MD PhD  [facility]    Mahnaz Hermosillo is a 55 year old who presents for the following health issues     HPI     Chronic Pain Follow-Up    Where in your body do you have pain? Neck pain from prior cervical fusion  How has your pain affected your ability to work? Not applicable, disabled  Which of these pain treatments have you tried since your last clinic visit? Cold, Heat, Pain Clinic, Physical Therapy, Surgery and TENS unit  How well are you sleeping? Good  How has your mood been since your last visit? Better  Have you had a significant life event? Other: pandemic, hasn't been out of the house.   Other aggravating factors: prolonged sitting, prolonged  standing and poor posture  Taking medication as directed? Yes, for Vicodin, she takes between 4 to 6 tablets/day.  Fentanyl patch 100 mcg every 72 hours.    PHQ-9 SCORE 6/20/2019 6/19/2020 3/23/2021   PHQ-9 Total Score - - -   PHQ-9 Total Score MyChart - - -   PHQ-9 Total Score 7 11 6     LUIZ-7 SCORE 4/19/2019 6/20/2019 3/23/2021   Total Score - - -   Total Score 2 1 0     No flowsheet data found.  Encounter-Level CSA:    There are no encounter-level csa.     Patient-Level CSA:    There are no patient-level csa.         02/19/2021  1   02/17/2021  Fentanyl 100 Mcg/Hr Patch  10.00  30 [employer] [facility]   [id number]   Wal ([id number])   0  240.00 MME  [place]   [place]   02/19/2021  1   02/17/2021  Hydrocodone-Acetamin  MG  180.00  30 [employer] [facility]   [id number]   Wal ([id number])   0  60.00 [place]  [place]         COPD: Severe, oxygen dependent.  Follows with pulmonary.  Recent labs noted mild anemia.  Patient advised to follow-up with PCP.      History of positive Hemoccult for colon cancer screening.  Due for colonoscopy.    Patient denies depression and anxiety.  She does endorse low energy level, feeling tired all the time.  She does not think this is related to depression.    She does not have sleep issue for the most part.  Now takes trazodone 1 to 2 tablets 3-4 times a week, not regularly.    She takes tizanidine on as-needed basis, a few times a week as well.      Review of Systems   Constitutional, HEENT, cardiovascular, pulmonary, gi and gu systems are negative, except as otherwise noted.      Objective           Vitals:  No vitals were obtained today due to virtual visit.    Physical Exam   GENERAL: Healthy, alert and no distress, home oxygen via nasal cannula  EYES: Eyes grossly normal to inspection.  No discharge or erythema, or obvious scleral/conjunctival abnormalities.  RESP: Intermittent bronchial cough, otherwise not respiratory distress  PSYCH: Mentation appears normal, affect normal/bright, judgement and insight intact, normal  speech and appearance well-groomed.      Chantix gave her upset stomach.       Video-Visit Details    Type of service:  Video Visit    Video End Time:3:31 PM    Originating Location (pt. Location): Home    Distant Location (provider location):  [facility]     Platform used for Video Visit: Neeru

## 2021-03-23 NOTE — PATIENT INSTRUCTIONS
Make appointment(s) for:   1. Get urine tests on Friday.   2. Sign the controlled medication agreement and have them fax over to [phone].  3. Colonoscopy at the [place].         Medication(s) prescribed today:    Orders Placed This Encounter   Medications     buPROPion (ZYBAN) 150 MG 12 hr tablet     Sig: Take 1 tablet (150 mg) by mouth 2 times daily     Dispense:  60 tablet     Refill:  2     HYDROcodone-acetaminophen (NORCO)  MG per tablet     Sig: Take 1-2 tablets by mouth every 6 hours as needed for moderate to severe pain     Dispense:  150 tablet     Refill:  0     Dose reduced.     fentaNYL (DURAGESIC) 100 mcg/hr 72 hr patch     Sig: Place 1 patch onto the skin every 72 hours (Mylan brand)     Dispense:  10 patch     Refill:  0     traZODone (DESYREL) 100 MG tablet     Sig: Take 1-2 tablets (100-200 mg) by mouth nightly as needed for sleep     Dispense:  90 tablet     Refill:  1     Dose reduced. Rx not sent.     tiZANidine (ZANAFLEX) 4 MG tablet     Sig: Take 1 tablet (4 mg) by mouth daily as needed for muscle spasms (1-2 times a week)     Dispense:  60 tablet     Refill:  0     Rx not sent.       `

## 2021-03-24 ASSESSMENT — ANXIETY QUESTIONNAIRES: GAD7 TOTAL SCORE: 0

## 2021-03-25 ENCOUNTER — APPOINTMENT (OUTPATIENT)
Dept: LAB | Facility: CLINIC | Age: 56
End: 2021-03-25
Payer: COMMERCIAL

## 2021-03-25 ENCOUNTER — IMMUNIZATION (OUTPATIENT)
Dept: PEDIATRICS | Facility: CLINIC | Age: 56
End: 2021-03-25
Payer: COMMERCIAL

## 2021-03-25 ENCOUNTER — PATIENT OUTREACH (OUTPATIENT)
Dept: PULMONOLOGY | Facility: CLINIC | Age: 56
End: 2021-03-25

## 2021-03-25 DIAGNOSIS — J43.9 PULMONARY EMPHYSEMA, UNSPECIFIED EMPHYSEMA TYPE (H): ICD-10-CM

## 2021-03-25 DIAGNOSIS — R06.09 DOE (DYSPNEA ON EXERTION): ICD-10-CM

## 2021-03-25 DIAGNOSIS — R53.83 FATIGUE, UNSPECIFIED TYPE: ICD-10-CM

## 2021-03-25 DIAGNOSIS — R91.8 PULMONARY NODULES: ICD-10-CM

## 2021-03-25 DIAGNOSIS — J44.9 CHRONIC OBSTRUCTIVE PULMONARY DISEASE, UNSPECIFIED COPD TYPE (H): Primary | ICD-10-CM

## 2021-03-25 PROCEDURE — 91300 PR COVID VAC PFIZER DIL RECON 30 MCG/0.3 ML IM: CPT

## 2021-03-25 PROCEDURE — 0001A PR COVID VAC PFIZER DIL RECON 30 MCG/0.3 ML IM: CPT

## 2021-03-25 NOTE — PROGRESS NOTES
Per Dr. Perlman, patient should have Echo at the same time of her PFT. Patient schedule for Echo on 4/8/21 at 2:30.     We also reviewed patient's Chest CT which was unremarkable per Dr. Perlman.     Will follow up with patient after the results are back.     Héctor Mcghee RN   Medical Specialty Clinic Care Coordinator  [facility]   Phone: [phone]  Fax: [phone]

## 2021-03-29 DIAGNOSIS — I10 HYPERTENSION GOAL BP (BLOOD PRESSURE) < 140/90: ICD-10-CM

## 2021-03-29 DIAGNOSIS — G89.4 CHRONIC PAIN SYNDROME: ICD-10-CM

## 2021-03-29 LAB
CREAT UR-MCNC: 256 MG/DL
MICROALBUMIN UR-MCNC: 23 MG/L
MICROALBUMIN/CREAT UR: 9.1 MG/G CR (ref 0–25)

## 2021-03-29 PROCEDURE — 82043 UR ALBUMIN QUANTITATIVE: CPT | Performed by: INTERNAL MEDICINE

## 2021-03-29 PROCEDURE — 80307 DRUG TEST PRSMV CHEM ANLYZR: CPT | Performed by: INTERNAL MEDICINE

## 2021-03-30 ENCOUNTER — MYC MEDICAL ADVICE (OUTPATIENT)
Dept: FAMILY MEDICINE | Facility: CLINIC | Age: 56
End: 2021-03-30

## 2021-03-31 LAB
CREAT UR-MCNC: 257 MG/DL
DHC UR CFM-MCNC: >5000 NG/ML
DHC/CREAT UR: ABNORMAL NG/MG{CREAT}
FENTANYL UR CFM-MCNC: 180 NG/ML
FENTANYL/CREAT UR: 70 NG/MG{CREAT}
HYDROCODONE UR CFM-MCNC: 7060 NG/ML
HYDROCODONE/CREAT UR: 2747 NG/MG{CREAT}
HYDROMORPHONE UR CFM-MCNC: 1860 NG/ML
HYDROMORPHONE/CREAT UR: 724 NG/MG{CREAT}
NORFENTANYL UR CFM-MCNC: 2920 NG/ML
NORFENTANYL/CREAT UR: 1136 NG/MG{CREAT}
RPT COMMENT: ABNORMAL

## 2021-04-06 NOTE — PROGRESS NOTES
Jaimee is a 55 year old who is being evaluated via a billable video visit.      How would you like to obtain your AVS? MyChart  If the video visit is dropped, the invitation should be resent by: Text to cell phone: [phone]  Will anyone else be joining your video visit? No    Video Start Time: 7:24 AM    Assessment & Plan     Jaimee was seen today for copd.    Diagnoses and all orders for this visit:    COPD exacerbation (H)  -     azithromycin (ZITHROMAX) 250 MG tablet; Take 2 tablets (500 mg) by mouth daily for 1 day, THEN 1 tablet (250 mg) daily for 4 days.  -     predniSONE (DELTASONE) 20 MG tablet; Take 2 tablets (40 mg) by mouth daily for 5 days    COPD, severe (H)    O2 dependent      COPD exacerbation. zpak and prednisone. If these do not turn things around, need to go to the hospital.     Maki Topete MD PhD  [facility]   Jaimee is a 55 year old who presents for the following health issues     HPI     COPD Follow-Up: symptoms started about a week ago, from clear mucous, turned yellow, now it is hard to get up the phlegm.  History of severe COPD, oxygen dependent. Her oxygen level has gone up to 3 L intermittently.     Overall, how are your COPD symptoms since your last clinic visit?  No change    How much fatigue or shortness of breath do you have when you are walking?  More than usual    How much shortness of breath do you have when you are resting?  None    How often do you cough? Sometimes    Have you noticed any change in your sputum/phlegm?  Yes- can't get the phlegm up and it is yellow/brown    Have you experienced a recent fever? No    Please describe how far you can walk without stopping to rest:  Less than 10 feet    How many flights of stairs are you able to walk up without stopping?  None    Have you had any Emergency Room Visits, Urgent Care Visits, or Hospital Admissions because of your COPD since your last office visit?  No    History   Smoking Status      Current Every Day Smoker     Packs/day: 0.75     Years: 40.00     Types: Cigarettes   Smokeless Tobacco     Never Used     Comment: started age 14.     No results found for: FEV1, QDB8TCL            Review of Systems   Constitutional, HEENT, cardiovascular, pulmonary, gi and gu systems are negative, except as otherwise noted.      Objective           Vitals:  No vitals were obtained today due to virtual visit.    Physical Exam   GENERAL: Healthy, alert and no distress  EYES: Eyes grossly normal to inspection.  No discharge or erythema, or obvious scleral/conjunctival abnormalities.  RESP: harsh intermittent wet sounding cough.   SKIN: Visible skin clear. No significant rash, abnormal pigmentation or lesions.  NEURO: Cranial nerves grossly intact.  Mentation and speech appropriate for age.  PSYCH: Mentation appears normal, affect normal/bright, judgement and insight intact, normal speech and appearance well-groomed.      Video-Visit Details    Type of service:  Video Visit    Video End Time:8:19 AM    Originating Location (pt. Location): Home    Distant Location (provider location):  [facility]     Platform used for Video Visit: Bapul

## 2021-04-12 NOTE — PROGRESS NOTES
Dr. Perlman reviewed patient's echo and PFT results. Patient's echo was normal but PFTs were decreased. He would like the patient to repeat them in 2-4 weeks to see if they have improved. He also is recommending patient switch to see Dr. Rey since she will need a little closer monitoring and Dr. Rey is available 3 days a week.     Patient is agreeable to plan. She will see Dr. Rey with a PFT prior on May 24.     Héctor Mcghee RN   Medical Specialty Clinic Care Coordinator  [facility]   Phone: [phone]  Fax: [phone]

## 2021-04-12 NOTE — RESULT ENCOUNTER NOTE
Dear Jaimee,   Your recent test results showed the following:  -- urine microalbumin is normal.  -- urine drug screen is as expected.     Please call or Mychart to our office if you have further questions.     Maki Topete MD-PhD

## 2021-04-14 NOTE — TELEPHONE ENCOUNTER
Writer received a refill request from: Steve in [place].     Medication:     tiotropium 2.5 MCG/ACT IN inhaler 12 g 11 2/20/2020  No   Sig - Route: Inhale 2 puffs into the lungs daily - Inhalation     Date last written: 02/20/2020  Dispensed amount: 12 g  Refills: 11  Date last dispensed: 02/04/2021      Pt's last office visit: 02/18/2021  Next scheduled office visit: 05/24/2021      Per the RN/LPN medication refill protocol, writer is  to refill this request.     (If able to refill, please call the pt to inform them the RX was sent to the pharmacy. If unable to refill, route this encounter to the prescribing physician for authorization or further instructions)

## 2021-04-19 NOTE — TELEPHONE ENCOUNTER
Routing refill request to provider for review/approval because:  Drug not on the [facility] refill protocol   Anita Pa BSN, RN

## 2021-04-20 NOTE — TELEPHONE ENCOUNTER
03/25/2021  1   03/23/2021  Fentanyl 100 Mcg/Hr Patch  10.00  30 T[facility] Topete   [id number]   Wal ([id number])   0  240.00 MME  [place]   [place]   03/23/2021  1   03/23/2021  Hydrocodone-Acetamin  MG  150.00  19 Tara Topete   [id number]   Wal ([id number])   0  78.95 MME  [place]   [place]

## 2021-05-24 NOTE — PATIENT INSTRUCTIONS

## 2021-05-24 NOTE — PROGRESS NOTES
"Jaimee Rodriguez's goals for this visit include: Consult  She requests these members of her care team be copied on today's visit information: PCP    PCP: Maki Topete    Referring Provider:  No referring provider defined for this encounter.    BP (!) 143/91   Pulse 84   Temp 98.1  F (36.7  C)   Resp 18   Ht 1.6 m (5' 3\")   Wt 79.4 kg (175 lb)   SpO2 92%   BMI 31.00 kg/m      Do you need any medication refills at today's visit? CASSIE Santos LPN  Pulmonary Medicine:  [facility]  Phone: [phone] Fax: [phone]      "

## 2021-05-24 NOTE — PROGRESS NOTES
Pulmonary Clinic Return Patient Visit  Reason for Visit: COPD  History of Present Illness  I had the pleasure of seeing Jaimee Rodriguez, who is a 55 yr old female with a history of COPD with chronic home oxygen therapy who return to clinic for management of same. She was previously seen in clinic by my colleague- Dr. Perlman on 02/18/2021.  To briefly review, Jaimee was diagnosed with COPD about 5-6 years ago based on symptoms and spirometric evaluation. She rarely requires hospitalizations but she normally requires outpatient prednisone treatments for a few days for flares. She also started using oxygen supplementation in the last 2 -3 years. She currently uses 2 LPM at rest and up to 4 LPM with activity. She has a pulse oximeter which she uses to check her O2 sats.    COPD was severe even at diagnosis and she is currently on triple therapy with high dose Dulera and Spiriva. She also uses a rescue albuterol inhaler which she uses several times during the day. She also appears to use her Dulera only once a day.  She recently quit smoking about 4 weeks ago with the help of Wellbutrin which was prescribed by her PCP- Dr. Topete. She started smoking at age 14, smoked about 0.7 ppd for a total of 40 pack years. Since quitting smoking, she noticed significant improvement in SOB and was less dyspneic even with activity. She also noticed improved O2 saturations and weaned down her oxygen supplementation requirements significantly. She noticed less coughing and mucus production.  However, in the last week or so, she has noticed a relapse in her symptoms. Although she has less productive cough, she noticed an interval worsening of dyspnea and wheezing back to when she used to smoke. She denies any chest pain, orthopnea, pedal swellings, fevers or sinus issues.      Review of Systems:  10 of 14 systems reviewed and are negative unless otherwise stated in HPI.    Past Medical History:   Diagnosis Date     Cervical spondylosis without  myelopathy     With radiculopathy.     Contact dermatitis and other eczema, due to unspecified cause     eczema     Endometriosis, site unspecified     R uterosacral ligament/surgically removed     Irregular menstrual cycle      NONSPECIFIC MEDICAL HISTORY     Mood swings, Irritability     Other acne      Other and unspecified ovarian cyst     sydni R     Tobacco use disorder 2001       Past Surgical History:   Procedure Laterality Date     C APPENDECTOMY       C  DELIVERY ONLY  ,,    , Low Cervical     C LIGATE FALLOPIAN TUBE      S/P tubal ligation     C TOTAL ABDOM HYSTERECTOMY      not total     pt did not have ovaries removed     HC ENLARGE BREAST WITH IMPLANT       SURGICAL HISTORY OF -   10/18/2005    C6 corpectomy with iliac crest autograft fixation. [facility]       Family History   Problem Relation Age of Onset     Cerebrovascular Disease Mother      Chronic Obstructive Pulmonary Disease Mother      Arthritis Father      Diabetes Maternal Grandmother      Cancer Maternal Aunt         breast       Social History     Socioeconomic History     Marital status:      Spouse name: Not on file     Number of children: Not on file     Years of education: Not on file     Highest education level: Not on file   Occupational History     Not on file   Social Needs     Financial resource strain: Not on file     Food insecurity     Worry: Not on file     Inability: Not on file     Transportation needs     Medical: Not on file     Non-medical: Not on file   Tobacco Use     Smoking status: Current Every Day Smoker     Packs/day: 0.75     Years: 40.00     Pack years: 30.00     Types: Cigarettes     Smokeless tobacco: Never Used     Tobacco comment: started age 14.   Substance and Sexual Activity     Alcohol use: Yes     Alcohol/week: 0.0 standard drinks     Comment: occ     Drug use: No     Sexual activity: Yes     Partners: Male     Birth control/protection:  Surgical   Lifestyle     Physical activity     Days per week: Not on file     Minutes per session: Not on file     Stress: Not on file   Relationships     Social connections     Talks on phone: Not on file     Gets together: Not on file     Attends Evangelical service: Not on file     Active member of club or organization: Not on file     Attends meetings of clubs or organizations: Not on file     Relationship status: Not on file     Intimate partner violence     Fear of current or ex partner: Not on file     Emotionally abused: Not on file     Physically abused: Not on file     Forced sexual activity: Not on file   Other Topics Concern      Service No     Blood Transfusions No     Caffeine Concern No     Occupational Exposure No     Hobby Hazards No     Sleep Concern Yes     Stress Concern No     Weight Concern No     Special Diet No     Comment: not a milk drinker     Back Care No     Exercise No     Comment: walks at work     Bike Helmet Not Asked     Seat Belt Yes     Self-Exams No     Parent/sibling w/ CABG, MI or angioplasty before 65F 55M? Not Asked   Social History Narrative     Not on file         Allergies   Allergen Reactions     Morphine          Current Outpatient Medications:      albuterol (PROAIR HFA/PROVENTIL HFA/VENTOLIN HFA) 108 (90 Base) MCG/ACT inhaler, Inhale 2 puffs into the lungs every 6 hours as needed for shortness of breath / dyspnea or wheezing, Disp: 1 Inhaler, Rfl: 11     ammonium lactate (AMLACTIN) 12 % external cream, APPLY DAILY AS NEEDED TO FEET FOR DRY SKIN, Disp: , Rfl:      buPROPion (ZYBAN) 150 MG 12 hr tablet, Take 1 tablet (150 mg) by mouth 2 times daily, Disp: 60 tablet, Rfl: 2     diclofenac (VOLTAREN) 1 % topical gel, Place 2 g onto the skin 4 times daily, Disp: 100 g, Rfl: 1     fentaNYL (DURAGESIC) 100 mcg/hr 72 hr patch, Place 1 patch onto the skin every 72 hours (Mylan brand), Disp: 10 patch, Rfl: 0     HYDROcodone-acetaminophen (NORCO)  MG per tablet, Take  "1-2 tablets by mouth every 6 hours as needed for moderate to severe pain, Disp: 150 tablet, Rfl: 0     mometasone-formoterol (DULERA) 200-5 MCG/ACT inhaler, Inhale 2 puffs into the lungs 2 times daily, Disp: 13 g, Rfl: 11     naloxone (NARCAN) 4 MG/0.1ML nasal spray, Spray 1 spray (4 mg) into one nostril alternating nostrils as needed for opioid reversal every 2-3 minutes until assistance arrives, Disp: 0.2 mL, Rfl: 0     Naloxone HCl (EVZIO) 2 MG/0.4ML SOAJ, Inject 2 mLs as directed as needed (opiod reversal) Every 2-3 minutes as needed until assistance arrives, Disp: 2 mL, Rfl: 0     order for DME, Equipment being ordered: Nebulizer, Disp: 1 Application, Rfl: 0     ORDER FOR DME, Equipment being ordered: Oxygen 4 lpm via nasal cannula continuous flow with any exertion and 2 lpm continuous flow with nasal cannula at rest and at night. Provide portability.  Length of need 99., Disp: 1 Device, Rfl: 1     tiotropium (SPIRIVA RESPIMAT) 2.5 MCG/ACT inhaler, Inhale 2 puffs into the lungs daily, Disp: 12 g, Rfl: 11     tiZANidine (ZANAFLEX) 4 MG tablet, TAKE 1 TABLET(4 MG) BY MOUTH THREE TIMES DAILY (Patient taking differently: 3 times daily as needed ), Disp: 90 tablet, Rfl: 3     traZODone (DESYREL) 100 MG tablet, Take 1-2 tablets (100-200 mg) by mouth nightly as needed for sleep, Disp: 90 tablet, Rfl: 1     fluticasone (FLONASE) 50 MCG/ACT nasal spray, Spray 1 spray into both nostrils daily (Patient not taking: Reported on 5/24/2021), Disp: 16 g, Rfl: 3      Physical Exam:  BP (!) 143/91   Pulse 84   Temp 98.1  F (36.7  C)   Resp 18   Ht 1.6 m (5' 3\")   Wt 79.4 kg (175 lb)   SpO2 92%   BMI 31.00 kg/m    GENERAL: Well developed, well nourished, alert, and in no apparent distress.  HEENT: Normocephalic, atraumatic. PERRL, EOMI. Oral mucosa is moist. No perioral cyanosis.  NECK: supple, no masses, no thyromegaly.  RESP:  Normal respiratory effort.  CTAB.  No rales, wheezes, rhonchi.  No cyanosis or clubbing.  CV: " Normal S1, S2, regular rhythm, normal rate. No murmur.  No LE edema.   ABDOMEN:  Soft, non-tender, non-distended.   SKIN: warm and dry. No rash.  NEURO: AAOx3.  Normal gait.  Fluent speech.  PSYCH: mentation appears normal.       Results:  PFTs: reviewed and discussed with patient Severe obstruction with impaired diffusion  Most Recent [facility] Pulmonary Function Testing    FVC-Pred   Date Value Ref Range Status   05/24/2021 3.12 L      FVC-Pre   Date Value Ref Range Status   05/24/2021 2.14 L      FVC-%Pred-Pre   Date Value Ref Range Status   05/24/2021 68 %      FEV1-Pre   Date Value Ref Range Status   05/24/2021 0.88 L      FEV1-%Pred-Pre   Date Value Ref Range Status   05/24/2021 35 %      FEV1FVC-Pred   Date Value Ref Range Status   05/24/2021 80 %      FEV1FVC-Pre   Date Value Ref Range Status   05/24/2021 41 %      No results found for: 20029  FEFMax-Pred   Date Value Ref Range Status   05/24/2021 6.27 L/sec      FEFMax-Pre   Date Value Ref Range Status   05/24/2021 3.21 L/sec      FEFMax-%Pred-Pre   Date Value Ref Range Status   05/24/2021 51 %      ExpTime-Pre   Date Value Ref Range Status   05/24/2021 8.14 sec      FIFMax-Pre   Date Value Ref Range Status   05/24/2021 5.42 L/sec      FEV1FEV6-Pred   Date Value Ref Range Status   05/24/2021 81 %      FEV1FEV6-Pre   Date Value Ref Range Status   05/24/2021 44 %      No results found for: 20055  Imaging (personally reviewed in clinic today): CT Chest 3/22/2021  1. There is a single new 3 mm nodule identified. Remainder of the nodules are stable.    2. Stable centrilobular emphysema and mild central bronchial wall thickening.  3. Stable nonenlarged lymphadenopathy chest.         Assessment and Plan:   Severe COPD (Group D)  CAT score of 25 today. Although spirometric evaluation reveals very severe obstruction, her FeV1 appears to have shown some improvement compared to the subsequent evaluations in the last 15 months. This improvement may be relate to the fact  that Jaimee has quit smoking in the last 4 - 5 weeks.  Her recent Echo was not concerning for cor-pulmonale or elevated pulmonary pressures. Normal EF with no diastolic dysfunction.  She is on triple therapy with high dose Dulera and Spiriva but she continues to use her albuterol several times a day. She uses her Dulera once a day instead of twice given the severity of her COPD. She is concerned about taking her Dulera with 2 puffs BID as it may require more than 1 inhaler cannister per month. Therefore, I will switch her to high dose Advair 1 puff BID and she can stop Dulera. Prescriptions were given. She declined pulmonary rehab. In the future, we will consider adding chronic low dose azithromycin.  Pulmonary Nodules/History of smoking  Will continue annual lung cancer screening which will suffice for pulmonary nodule surveillance.   Lung Cancer Screening Shared Decision Making Visit   Jaimee Rodriguez is eligible for lung cancer screening on the basis of the information provided in my signed lung cancer screening order. I have discussed with patient the risks and benefits of screening for lung cancer with low-dose CT.   The risks include:  radiation exposure: one low dose chest CT has as much ionizing radiation as about 15 chest x-rays or 6 months of background radiation living in [place]    false positives: 96% of positive findings/nodules are NOT cancer, but some might still require additional diagnostic evaluation, including biopsy  over-diagnosis: some slow growing cancers that might never have been clinically significant will be detected and treated unnecessarily     The benefit of early detection of lung cancer is contingent upon adherence to annual screening or more frequent follow up if indicated.     Furthermore, reaping the benefits of screening requires Jaimee Rodriguez to be willing and physically able to undergo diagnostic procedures, if indicated. Although no specific guide is available for determining  severity of comorbidities, it is reasonable to withhold screening in patients who have greater mortality risk from other diseases.     We did discuss that the only way to prevent lung cancer is to not smoke. Smoking cessation counseling was not given.      I did not offer risk estimation using a calculator such as this one:    ShouldIScreen            Questions and concerns were answered to the patient's satisfaction.  she was provided with my contact information should new questions or concerns arise in the interim.  I spent a total of 30 minutes face to face with Jaimee Rodriguez during today's office visit. Over 50% of this time was spent counseling the patient and/or coordinating care regarding their pulmonary disease.      she should return to clinic in 6 months  Up to date on Pneumovax (2014), Prevnar (2015)  Ten Rey MD  Pulmonary, Critical Care and Sleep Medicine  [facility]  Office: [phone]      The above note was dictated using voice recognition software and may include typographical errors. Please contact the author for any clarifications.

## 2021-06-18 NOTE — TELEPHONE ENCOUNTER
[facility] Call Center    Phone Message    May a detailed message be left on voicemail: yes     Reason for Call: Patient called stating that it hurts to breathe in. It started in her back. Patient wants to know if she should be seen. Please advise. Thank you.    Action Taken: Message routed to:  Adult Clinics: Pulmonology p [id number]    Travel Screening: Not Applicable

## 2021-06-18 NOTE — TELEPHONE ENCOUNTER
Complaint: hurts to breathe in. Feels like she may have over done it with activity. Feels muscular pain, denies SOB. Rib pain when taking deep breath.     Duration: started yesterday when cleaning, worse this morning when waking up.     What makes it worse/better: as the day has gone on, activity has improved breathing.       Fever: No fevers    Sputum:coughs up infrequent, 2-3 times a day with small amount of yellow sputum coming up. Occurring since she quit smoking. Quit smoking since April per patient report.    Medications: compliant with all prescribed medications.     O2 Sats: 2-3 LPM; 95-96%     Will route to Dr. Rey. Advised patient to call if things worsen again. Reported improved since this AM when called and patient feels it was muscle related. Told her if difficulty breathing and oxygen levels are affected to be seen by ED/Urgent care. Told patient to call back if symptoms worsen.    Pauline Adams, RN, BSN  Pulmonary Nurse Care Coordinator  Phone: [phone]  Fax: [phone]

## 2021-06-18 NOTE — TELEPHONE ENCOUNTER
Routing refill request to provider for review/approval because:  Drugs not on the [facility] refill protocol       Bhavana Yang RN  [facility]

## 2021-06-22 NOTE — TELEPHONE ENCOUNTER
Let pt know she will be due for office visit for med review for annual wellness visit and pain medication review in July. Please schedule. I approved fentanyl patch and hydrocodone for the next month. Due for fasting labs as well

## 2021-07-09 NOTE — TELEPHONE ENCOUNTER
Routing refill request to provider for review/approval because:  Drug not on the [facility] refill protocol     See pt message  Anita LOPEZN, RN

## 2021-07-12 NOTE — TELEPHONE ENCOUNTER
Called pharmacy and spoke with Luann.  Gave her a verbal ok to fill the Fentanyl and Norco early due to patient going on vacation    Snapwizt message sent    Fady Davila RN  [facility]

## 2021-07-12 NOTE — TELEPHONE ENCOUNTER
Duplicate      See other mychart encounter that has been routed to provider    Fady Davila RN  [facility]

## 2021-07-13 NOTE — TELEPHONE ENCOUNTER
Pharmacist called back stating insurance was giving a hard stop on dates written on Prescriptions for Norco and Fentanyl, a verbal is not sufficient to override,   Dr. Maki Topete- please re-send Prescriptions for today's date and they will cancel ones for 7/22 & 7/24    Sara Kay RN, BSN, CMSRN  [facility]

## 2021-07-13 NOTE — TELEPHONE ENCOUNTER
Patient also called to check on status of this, noted she was not sure if the pharmacy had contacted office or not so she was calling too.    Insurance will not allow early refill of the already given prescriptions for 7/22 and 7/24.  New prescriptions need to be sent with same day date, in order to be allowed to fill early.  Patient leaving state to go on vacation on 7/16, not returning till early August.  Has to have this adjusted so can fill her meds prior to leaving, otherwise she will not have them at all.  Updated patient that PCP is out of office, returning tomorrow, may not address till morning. Patient is okay with this, as long as can be addressed by tomorrow, notes she is running out of time, leaves on Friday.      Bhavana Yang RN  [facility]

## 2021-07-14 NOTE — TELEPHONE ENCOUNTER
PA Initiation    Medication: fentaNYL (DURAGESIC) 100 mcg/hr 72 hr patch   Insurance Company: [employer] - Phone [phone] Fax [phone]  Pharmacy Filling the Rx: [id number] [place] [address]  Filling Pharmacy Phone: [phone]  Filling Pharmacy Fax: [phone]  Start Date: 7/14/2021

## 2021-07-14 NOTE — TELEPHONE ENCOUNTER
Message from [facility] [phone]    fentaNYL (DURAGESIC) 100 mcg/hr 72 hr patch    Drug not covered by patient plan    The preferred alternative is MORPHINESULFATE, BELBUCA, HYDROCODONEBITARTRATE.    Purvi Stephens

## 2021-07-14 NOTE — TELEPHONE ENCOUNTER
Pharmacist calling wondering why Fentanyl was ordered with date 7/24? She states the entire reason for this encounter was for early refill patient going out of town  Dr. Maki Topete- please re-send Fentanyl for today's date    Sara Kay RN, BSN, [facility]  [facility]

## 2021-07-15 NOTE — TELEPHONE ENCOUNTER
PA Initiation    Medication: HYDROcodone-acetaminophen (NORCO)  MG per tablet  Insurance Company: [employer] - Phone [phone] Fax [phone]  Pharmacy [facility]illing the Rx: [id number] [place] [address]  Filling Pharmacy Phone: [phone]  Filling Pharmacy Fax: [phone]  Start Date: 7/15/2021

## 2021-07-15 NOTE — TELEPHONE ENCOUNTER
Prior Authorization Approval    Authorization Effective Date: 6/15/2021  Authorization Expiration Date: 7/15/2022  Medication: HYDROcodone-acetaminophen (NORCO)  MG per tablet  Approved Dose/Quantity:   Reference #: [id number]   Insurance Company: [employer] - Phone [phone] Fax [phone]  Expected CoPay:       CoPay Card Available:      Foundation Assistance Needed:    Which Pharmacy is filling the prescription (Not needed for infusion/clinic administered): [id number] [place] [address]  Pharmacy Notified: Yes  Patient Notified: Yes  **Instructed pharmacy to notify patient when script is ready to /ship.**

## 2021-07-15 NOTE — TELEPHONE ENCOUNTER
PA for HYDROcodone-acetaminophen (NORCO)  MG per tablet    Plan does not cover this medication    Phone# [phone]    ID# [id number]    PA or alternative    Purvi Stephens

## 2021-07-15 NOTE — TELEPHONE ENCOUNTER
Patient Quality Outreach      Summary:    Patient has the following on her problem list/HM:     Depression / Dysthymia review    6 Month Remission: 4-8 month window range:   12 Month Remission: 10-14 month window range:        PHQ-9 SCORE 6/20/2019 6/19/2020 3/23/2021   PHQ-9 Total Score - - -   PHQ-9 Total Score MyChart - - -   PHQ-9 Total Score 7 11 6       If PHQ-9 recheck is 5 or more, route to provider for next steps.    Patient is due/failing the following:   Colonoscopy    Type of outreach:    Sent [employer] message.    Questions for provider review:    None                                                                                                                                     Purvi Stephens

## 2021-07-15 NOTE — TELEPHONE ENCOUNTER
Prior Authorization Approval    Authorization Effective Date: 6/14/2021  Authorization Expiration Date: 7/14/2022  Medication: fentaNYL (DURAGESIC) 100 mcg/hr 72 hr patch--APPROVED  Approved Dose/Quantity:   Reference #:     Insurance Company: [employer] - Phone [phone] Fax [phone]  Expected CoPay:       CoPay Card Available:      Foundation Assistance Needed:    Which Pharmacy is filling the prescription (Not needed for infusion/clinic administered): [id number] [place] [address]  Pharmacy Notified: Yes  Patient Notified: Yes **Instructed pharmacy to notify patient when script is ready to /ship.**

## 2021-08-18 NOTE — TELEPHONE ENCOUNTER
Patient requesting refills of fentaNYL (DURAGESIC) 100 mcg/hr 72 hr patch and HYDROcodone-acetaminophen (NORCO)  MG per tablet to be filled at [facility] [address]

## 2021-09-17 NOTE — PROGRESS NOTES
Assessment & Plan     Jaimee was seen today for hypertension.    Diagnoses and all orders for this visit:    Chronic neck pain  Comments:  long term opioid use for pain control     Flu vaccine need  -     Cancel: INFLUENZA VACCINE IM > 6 MONTHS VALENT IIV4 (AFLURIA/FLUZONE)  -     INFLUENZA QUAD, PF (RIV4) (FLUBLOK)    Fusion of spine of cervical region  Comments:  long term opioid use for pain control     Chronic, continuous use of opioids    Status post cervical spinal arthrodesis    Muscle spasm    Chronic insomnia  -     traZODone (DESYREL) 100 MG tablet; Take 1-2 tablets (100-200 mg) by mouth nightly as needed for sleep Average 1 time per week.    Chronic pain syndrome  Comments:  long term opioid use for pain control     COPD, severe (H)  Comments:  monitor for respiratory suppression with long term opioid. clinically stable. she follows with pulmonary on this     O2 dependent  Comments:  monitor for respiratory suppression with long term opioid. clinically stable. she follows with pulmonary on this            Return in about 3 months (around 2021) for wellness visit.    Maki Topete MD PhD  [facility]   Jaimee is a 56 year old who presents for the following health issues     HPI       Depression and Anxiety Follow-Up  Bupropion 150mg qd    How are you doing with your depression since your last visit? No change    How are you doing with your anxiety since your last visit?  No change    Are you having other symptoms that might be associated with depression or anxiety? No    Have you had a significant life event? No     Do you have any concerns with your use of alcohol or other drugs? No    Social History     Tobacco Use     Smoking status: Former Smoker     Packs/day: 0.75     Years: 40.00     Pack years: 30.00     Types: Cigarettes     Quit date: 2021     Years since quittin.4     Smokeless tobacco: Never Used     Tobacco comment: started age 14.   Substance  Use Topics     Alcohol use: Yes     Alcohol/week: 0.0 standard drinks     Comment: occ     Drug use: No     PHQ 6/19/2020 3/23/2021 9/17/2021   PHQ-9 Total Score 11 6 7   Q9: Thoughts of better off dead/self-harm past 2 weeks Not at all Not at all Not at all     LUIZ-7 SCORE 6/20/2019 3/23/2021 9/17/2021   Total Score - - -   Total Score 1 0 1     5  0956}  COPD Follow-Up  Dulera 200-5mcg/ACT 2 puffs bid, home oxygen, spiriva 2.5mcg/ACT 2 puffs every day, albuterol 108mcg/ACT 2 puffs q6h prn    Overall, how are your COPD symptoms since your last clinic visit?  No change    How much fatigue or shortness of breath do you have when you are walking?  Same as usual    How much shortness of breath do you have when you are resting?  Same as usual    How often do you cough? Rarely    Have you noticed any change in your sputum/phlegm?  No    Have you experienced a recent fever? No    Please describe how far you can walk without stopping to rest:  Less than 1 block    How many flights of stairs are you able to walk up without stopping?  2    Have you had any Emergency Room Visits, Urgent Care Visits, or Hospital Admissions because of your COPD since your last office visit?  No    History   Smoking Status     Former Smoker     Packs/day: 0.75     Years: 40.00     Types: Cigarettes     Quit date: 4/29/2021   Smokeless Tobacco     Never Used     Comment: started age 14.     She follows with pulmonary for this.     Chronic Pain Follow-Up  See medication list  Where in your body do you have pain? Neck  How has your pain affected your ability to work? Not applicable  Which of these pain treatments have you tried since your last clinic visit? Pain medication  How well are you sleeping? Poor  How has your mood been since your last visit? About the same  Have you had a significant life event? No  Other aggravating factors: weather  Taking medication as directed? Yes    PHQ-9 SCORE 6/19/2020 3/23/2021 9/17/2021   PHQ-9 Total Score - - -  "  PHQ-9 Total Score MyChart - - -   PHQ-9 Total Score 11 6 7     LUIZ-7 SCORE 6/20/2019 3/23/2021 9/17/2021   Total Score - - -   Total Score 1 0 1     No flowsheet data found.  Encounter-Level CSA:    There are no encounter-level csa.     Patient-Level CSA:    Controlled Substance Agreement - Opioid - Scan on 5/2/2021 12:22 PM             Review of Systems   Constitutional, HEENT, cardiovascular, pulmonary, gi and gu systems are negative, except as otherwise noted.      Objective    /74   Pulse 82   Temp 97.5  F (36.4  C) (Tympanic)   Resp 18   Ht 1.6 m (5' 3\")   Wt 81.8 kg (180 lb 4 oz)   SpO2 94%   BMI 31.93 kg/m    Body mass index is 31.93 kg/m .  Physical Exam   GENERAL: healthy, alert and no distress  Holding neck side ways.   MS: no gross musculoskeletal defects noted, no edema      "

## 2021-11-01 NOTE — LETTER
November 2, 2021      Jaimee Rodriguez  [address] [address]              To Whom It May Concern,    Jaimee Rodriguez is under my professional care for a number of chronic health issues including chronic pain with opioid dependence, severe COPD and oxygen dependence. She is not able to sit for prolonged period of time and is dependent on a constant source of oxygen. The medications she is on may impair her judgment. I recommend she be excused from jury duty.      Should you have any questions, please feel free to contact me at the address above.          Sincerely,        Maki Topete MD PhD

## 2021-11-19 NOTE — TELEPHONE ENCOUNTER
Controlled Substance Refill Request for Norco and Fentanyl  Problem List Complete:    No     PROVIDER TO CONSIDER COMPLETION OF PROBLEM LIST AND OVERVIEW/CONTROLLED SUBSTANCE AGREEMENT    Norco:  HYDROcodone-acetaminophen (NORCO)  MG per tablet 150 tablet 0 10/22/2021  No   Sig - Route: Take 1-2 tablets by mouth every 6 hours as needed for moderate to severe pain       Fentanyl:  fentaNYL (DURAGESIC) 100 mcg/hr 72 hr patch 10 patch 0 10/22/2021  No   Sig - Route: Place 1 patch onto the skin every 72 hours (Mylan brand) - Transdermal       THE MOST RECENT OFFICE VISIT MUST BE WITHIN THE PAST 3 MONTHS. AT LEAST ONE FACE TO FACE VISIT MUST OCCUR EVERY 6 MONTHS. ADDITIONAL VISITS CAN BE VIRTUAL.  (THIS STATEMENT SHOULD BE DELETED.)    [place] [facility] [place] 9[place]17/21    Future Office visit:   Next 5 appointments (look out 90 days)    Nov 29, 2021  9:45 AM  Office Visit with PFT LAB  [facility] ([facility]) [address] [place] [id number]  [phone]   Nov 29, 2021 11:00 AM  Return Visit with Ten Rey MD  [facility] ([facility]) [address] [place] [phone]  [phone]          Controlled substance agreement:   CSA -- Encounter Level:    CSA: None found at the encounter level.     CSA -- Patient Level:    Controlled Substance Agreement - Opioid - Scan on 5/2/2021 12:22 PM         Last Urine Drug Screen:   Pain Drug SCR UR W RPTD Meds   Date Value Ref Range Status   02/05/2019 FINAL  Final     Comment:     (Note)  ====================================================================  [facility] COMP DRUG ANALYSIS,UR  ====================================================================  Test                             Result       Flag       Units        Drug Present and Declared for Prescription Verification   Hydrocodone                    2722          EXPECTED   ng/mg creat   Hydromorphone                  424          EXPECTED   ng/mg creat   Dihydrocodeine                 383          EXPECTED   ng/mg creat   Norhydrocodone                 >3846        EXPECTED   ng/mg creat    Sources of hydrocodone include scheduled prescription    medications. Hydromorphone, dihydrocodeine and norhydrocodone are    expected metabolites of hydrocodone. Hydromorphone and    dihydrocodeine are also available as scheduled prescription    medications.   Fentanyl                       256          EXPECTED   ng/mg creat   Norfentanyl                    622          EXPECTED   ng/mg creat    Source of fentanyl is a scheduled prescription medication,    including IV, patch, and transmucosal formulations. Norfentanyl    is an expected metabolite of fentanyl.   Trazodone                      PRESENT      EXPECTED                 1,3 chlorophenyl piperazine    PRESENT      EXPECTED                  1,3-chlorophenyl piperazine is an expected metabolite of    trazodone.   Acetaminophen                  PRESENT      EXPECTED                 Guaifenesin                    PRESENT      EXPECTED                  Guaifenesin may be administered as an over-the-counter or    prescription drug; it may also be present as a breakdown product    of methocarbamol.  Drug Absent but Declared for Prescription Verification   Tizanidine                     Not Detected UNEXPECTED                Tizanidine, as indicated in the declared medication list, is not    always detected even when used as directed.   Methocarbamol                  Not Detected UNEXPECTED               Zolpidem                       Not Detected UNEXPECTED                Zolpidem, as indicated in the declared medication list, is not    always detected even when used as directed.   Venlafaxine                    Not Detected UNEXPECTED              ====================================================================  Test                       Result    Flag   Units      Ref Range        Creatinine              130              mg/dL      >=20            ====================================================================  Declared Medications:  The flagging and interpretation on this report are based on the  following declared medications.  Unexpected results may arise from  inaccuracies in the declared medications.  **Note: The testing scope of this panel includes these medications:  Fentanyl  Guaifenesin  Hydrocodone (Hydrocodone-Acetaminophen)  Methocarbamol  Trazodone  Venlafaxine  **Note: The testing scope of this panel does not include small to  moderate amounts of these reported medications:  Acetaminophen (Hydrocodone-Acetaminophen)  Tizanidine  Zolpidem  **Note: The testing scope of this panel does not include following  reported medications:  Albuterol  Azithromycin  Formoterol (Dulera)  Hydrochlorothiazide (Lisinopril-HCTZ)  Lisinopril (Lisinopril-HCTZ)  Mometasone (Dulera)  Polyethylene Glycol  Prednisone  Tiotropium  ====================================================================  For clinical consultation, please call [phone].  ====================================================================  Analysis performed by [employer] [place] [address]     , No results found for: COMDAT, No results found for: THC13, PCP13, COC13, MAMP13, OPI13, AMP13, BZO13, TCA13, MTD13, BAR13, OXY13, PPX13, BUP13    [email or web address]     checked in past 3 months?    JESSICA ThompsonN, RN

## 2021-12-08 NOTE — PATIENT INSTRUCTIONS

## 2021-12-08 NOTE — TELEPHONE ENCOUNTER
Critical life sustaining medical equipment and medical emergency form signed by Dr. Rey. Patient is oxygen dependent d/t Dx: COPD. Forms have been faxed to [email or web address] @[phone].    Rodriguez Santos LPN  Pulmonary Medicine:  [facility]  Phone: [phone] Fax: [phone]

## 2021-12-08 NOTE — PROGRESS NOTES
"Jaimee Rodriguez's goals for this visit include: Return  She requests these members of her care team be copied on today's visit information: PCP    PCP: Maki Topete    Referring Provider:  No referring provider defined for this encounter.    /87   Pulse 88   Temp 97.9  F (36.6  C)   Resp 18   Ht 1.6 m (5' 3\")   Wt 83 kg (183 lb)   SpO2 94%   BMI 32.42 kg/m      Do you need any medication refills at today's visit? N      Rodriguez Santos LPN  Pulmonary Medicine:  [facility]  Phone: [phone] Fax: [phone]      "

## 2021-12-08 NOTE — PROGRESS NOTES
pft  Pulmonary Clinic Return Patient Visit  Reason for Visit: COPD  History of Present Illness  Jaimee Rodriguez is a 55 yr old female with a history of COPD with chronic home oxygen therapy who return to clinic for management of same. I last saw her in clinic in 5/2021.  To briefly review, Jaimee was diagnosed with COPD about 6 years ago based on symptoms and spirometric evaluation. She rarely required hospitalizations but she normally requires outpatient prednisone treatments for a few days for flares. She became oxygen dependent in the last 3 years. She currently uses 2 LPM at rest and up to 4 LPM with activity. She has a pulse oximeter which she uses to check her O2 sats.   COPD was severe even at diagnosis and she was on triple therapy with high dose Dulera and Spiriva. She also uses a rescue albuterol inhaler which she uses several times during the day.   When I saw her in clinic, she had significant COPD related symptoms with her CAT score of 25.  Given that she was not using her Dulera as prescribed due to concern for financial constraints, I switched her to high-dose Advair and continued Spiriva.  She has also recently quit smoking at the time of the last visit with interval improvement in her dyspnea on exertion and as well as productive cough.  Today, she continues to remain very symptomatic with significant exercise limitations due to dyspnea on exertion.  She continues to cough but is barely productive, in the past, her cough has been very productive.  She also has frequent wheezing and chest tightness.  She continues to take Mucinex to help with sputum expectoration's but has not been helpful.  She had a mild exacerbation during the summer for which I had given her 5 days of prednisone 40 mg with good results.  She continues on her high-dose Dulera and Spiriva as she could not tolerate Advair due to the fact that it was a powdered formulation.   She denies any overt chest pain, no night sweats, no weight  loss and no increased pedal swellings.  She has tried to remain abstinent from smoking, occasionally she would have about a half a cigarette per week but for the most part has near complete abstinence.      Review of Systems:  10 of 14 systems reviewed and are negative unless otherwise stated in HPI.    Past Medical History:   Diagnosis Date     Cervical spondylosis without myelopathy     With radiculopathy.     Contact dermatitis and other eczema, due to unspecified cause     eczema     Endometriosis, site unspecified     R uterosacral ligament/surgically removed     Irregular menstrual cycle      NONSPECIFIC MEDICAL HISTORY     Mood swings, Irritability     Other acne      Other and unspecified ovarian cyst     sydni R     Tobacco use disorder 2001       Past Surgical History:   Procedure Laterality Date     C APPENDECTOMY       C  DELIVERY ONLY  ,,    , Low Cervical     C LIGATE FALLOPIAN TUBE      S/P tubal ligation     C TOTAL ABDOM HYSTERECTOMY      not total     pt did not have ovaries removed     HC ENLARGE BREAST WITH IMPLANT       SURGICAL HISTORY OF -   10/18/2005    C6 corpectomy with iliac crest autograft fixation. [facility]       Family History   Problem Relation Age of Onset     Cerebrovascular Disease Mother      Chronic Obstructive Pulmonary Disease Mother      Arthritis Father      Diabetes Maternal Grandmother      Cancer Maternal Aunt         breast       Social History     Socioeconomic History     Marital status:      Spouse name: Not on file     Number of children: Not on file     Years of education: Not on file     Highest education level: Not on file   Occupational History     Not on file   Social Needs     Financial resource strain: Not on file     Food insecurity     Worry: Not on file     Inability: Not on file     Transportation needs     Medical: Not on file     Non-medical: Not on file   Tobacco Use     Smoking status:  Current Every Day Smoker     Packs/day: 0.75     Years: 40.00     Pack years: 30.00     Types: Cigarettes     Smokeless tobacco: Never Used     Tobacco comment: started age 14.   Substance and Sexual Activity     Alcohol use: Yes     Alcohol/week: 0.0 standard drinks     Comment: occ     Drug use: No     Sexual activity: Yes     Partners: Male     Birth control/protection: Surgical   Lifestyle     Physical activity     Days per week: Not on file     Minutes per session: Not on file     Stress: Not on file   Relationships     Social connections     Talks on phone: Not on file     Gets together: Not on file     Attends Alevism service: Not on file     Active member of club or organization: Not on file     Attends meetings of clubs or organizations: Not on file     Relationship status: Not on file     Intimate partner violence     Fear of current or ex partner: Not on file     Emotionally abused: Not on file     Physically abused: Not on file     Forced sexual activity: Not on file   Other Topics Concern      Service No     Blood Transfusions No     Caffeine Concern No     Occupational Exposure No     Hobby Hazards No     Sleep Concern Yes     Stress Concern No     Weight Concern No     Special Diet No     Comment: not a milk drinker     Back Care No     Exercise No     Comment: walks at work     Bike Helmet Not Asked     Seat Belt Yes     Self-Exams No     Parent/sibling w/ CABG, MI or angioplasty before 65F 55M? Not Asked   Social History Narrative     Not on file         Allergies   Allergen Reactions     Morphine          Current Outpatient Medications:      albuterol (PROAIR HFA/PROVENTIL HFA/VENTOLIN HFA) 108 (90 Base) MCG/ACT inhaler, Inhale 2 puffs into the lungs every 6 hours as needed for shortness of breath / dyspnea or wheezing, Disp: 18 g, Rfl: 11     ammonium lactate (AMLACTIN) 12 % external cream, APPLY DAILY AS NEEDED TO FEET FOR DRY SKIN, Disp: , Rfl:      buPROPion (ZYBAN) 150 MG 12 hr  "tablet, TAKE 1 TABLET(150 MG) BY MOUTH TWICE DAILY, Disp: 60 tablet, Rfl: 2     diclofenac (VOLTAREN) 1 % topical gel, Place 2 g onto the skin 4 times daily, Disp: 100 g, Rfl: 1     fentaNYL (DURAGESIC) 100 mcg/hr 72 hr patch, Place 1 patch onto the skin every 72 hours (Mylan brand), Disp: 10 patch, Rfl: 0     fluticasone (FLONASE) 50 MCG/ACT nasal spray, Spray 1 spray into both nostrils daily, Disp: 16 g, Rfl: 11     HYDROcodone-acetaminophen (NORCO)  MG per tablet, Take 1-2 tablets by mouth every 6 hours as needed for moderate to severe pain, Disp: 150 tablet, Rfl: 0     mometasone-formoterol (DULERA) 200-5 MCG/ACT inhaler, Inhale 2 puffs into the lungs 2 times daily, Disp: 13 g, Rfl: 11     naloxone (NARCAN) 4 MG/0.1ML nasal spray, Spray 1 spray (4 mg) into one nostril alternating nostrils as needed for opioid reversal every 2-3 minutes until assistance arrives, Disp: 0.2 mL, Rfl: 0     order for DME, Equipment being ordered: Nebulizer, Disp: 1 Application, Rfl: 0     ORDER FOR DME, Equipment being ordered: Oxygen 4 lpm via nasal cannula continuous flow with any exertion and 2 lpm continuous flow with nasal cannula at rest and at night. Provide portability.  Length of need 99., Disp: 1 Device, Rfl: 1     predniSONE (DELTASONE) 20 MG tablet, Take 2 tablets (40 mg) by mouth daily, Disp: 10 tablet, Rfl: 0     tiotropium (SPIRIVA RESPIMAT) 2.5 MCG/ACT inhaler, Inhale 2 puffs into the lungs daily, Disp: 12 g, Rfl: 11     tiZANidine (ZANAFLEX) 4 MG tablet, Take 1 tablet (4 mg) by mouth 3 times daily as needed for muscle spasms (Due for follow up with PCP for future refills), Disp: 90 tablet, Rfl: 0     traZODone (DESYREL) 100 MG tablet, TAKE 1 TO 2 TABLETS BY MOUTH NIGHTLY AS NEEDED FOR SLEEP. AVERAGE 1 TIME PER WEEK., Disp: 30 tablet, Rfl: 1      Physical Exam:  /87   Pulse 88   Temp 97.9  F (36.6  C)   Resp 18   Ht 1.6 m (5' 3\")   Wt 83 kg (183 lb)   SpO2 94%   BMI 32.42 kg/m    GENERAL: Well " developed, well nourished, alert, and in no apparent distress.  HEENT: Normocephalic, atraumatic. PERRL, EOMI. Oral mucosa is moist. No perioral cyanosis.  NECK: supple, no masses, no thyromegaly.  RESP: Markedly reduced breath sounds bilaterally, minimal expiratory wheezes no cyanosis or clubbing.  CV: Normal S1, S2, regular rhythm, normal rate. No murmur.  Trace pedal edema  ABDOMEN:  Soft, non-tender, non-distended.   SKIN: warm and dry. No rash.  NEURO: AAOx3.  Normal gait.  Fluent speech.  PSYCH: mentation appears normal.       Results:  PFTs: reviewed and discussed with patient Severe obstruction with impaired diffusion. There has been interval reduction in her lung function since her last clinic visit.  Most Recent [facility] Pulmonary Function Testing    FVC-Pred   Date Value Ref Range Status   12/07/2021 3.09 L      FVC-Pre   Date Value Ref Range Status   12/07/2021 1.59 L      FVC-%Pred-Pre   Date Value Ref Range Status   12/07/2021 51 %      FEV1-Pre   Date Value Ref Range Status   12/07/2021 0.52 L      FEV1-%Pred-Pre   Date Value Ref Range Status   12/07/2021 21 %      FEV1FVC-Pred   Date Value Ref Range Status   12/07/2021 80 %      FEV1FVC-Pre   Date Value Ref Range Status   12/07/2021 33 %      No results found for: 20029  FEFMax-Pred   Date Value Ref Range Status   12/07/2021 6.22 L/sec      FEFMax-Pre   Date Value Ref Range Status   12/07/2021 2.15 L/sec      FEFMax-%Pred-Pre   Date Value Ref Range Status   12/07/2021 34 %      ExpTime-Pre   Date Value Ref Range Status   12/07/2021 7.86 sec      FIFMax-Pre   Date Value Ref Range Status   12/07/2021 2.98 L/sec      FEV1FEV6-Pred   Date Value Ref Range Status   12/07/2021 81 %      FEV1FEV6-Pre   Date Value Ref Range Status   12/07/2021 37 %      No results found for: 20055  Imaging (personally reviewed in clinic today): CT Chest 3/22/2021  1. There is a single new 3 mm nodule identified. Remainder of the nodules are stable.    2. Stable centrilobular  emphysema and mild central bronchial wall thickening.  3. Stable nonenlarged lymphadenopathy chest.         Assessment and Plan:   Severe COPD (Group D)   Very symptomatic with a CAT score of 30.  She is even more symptomatic compared to the last time I saw her with significant reliance on her rescue inhalers.  Her lung function today has shown a significant decrease with reduction in her FEV1.  Her current regiment continues to include high-dose Dulera and Spiriva, but she still continues to use her Dulera once a day due to concerns for cost.  I will go ahead and switch her regimen to high-dose Advair twice a day in addition to Spiriva so that she can use her inhalers in a prescribed scheduled fashion.   She has quit smoking, and encouraged her to continue to remain abstinent, although she still has periods when she smokes about a half a cigarette very rarely  Her most recent Echo was not concerning for cor-pulmonale or elevated pulmonary pressures. Normal EF with no diastolic dysfunction..   We also discussed lung transplant.  I explained to her that she has to be completely abstinent to be considered for a lung transplant, I believe she may be an ideal candidate.  We will touch base during the next limit visit to confirm her complete abstinence and I will hope to refer her for lung transplant pre-evaluation.  I reviewed her CT chest and she is not an ideal candidate for lung reduction surgery.    I will also intensify her regimen by adding chronic low-dose azithromycin.  Prescriptions for Advair and azithromycin to 50 mg on Mondays Wednesdays and Fridays were given today.  I did review her EKG and her QTC was only 420 ms.  She continues to decline pulmonary rehab  Pulmonary Nodules/History of smoking  Will continue annual lung cancer screening which will suffice for pulmonary nodule surveillance. Next scan is ordered for 3/2022  Lung Cancer Screening Shared Decision Making Visit     Jaimee Rodriguez is eligible for  lung cancer screening on the basis of the information provided in my signed lung cancer screening order.     I have discussed with patient the risks and benefits of screening for lung cancer with low-dose CT.     The risks include:  radiation exposure: one low dose chest CT has as much ionizing radiation as about 15 chest x-rays or 6 months of background radiation living in [place]    false positives: 96% of positive findings/nodules are NOT cancer, but some might still require additional diagnostic evaluation, including biopsy  over-diagnosis: some slow growing cancers that might never have been clinically significant will be detected and treated unnecessarily     The benefit of early detection of lung cancer is contingent upon adherence to annual screening or more frequent follow up if indicated.     Furthermore, reaping the benefits of screening requires Jaimee Rodriguez to be willing and physically able to undergo diagnostic procedures, if indicated. Although no specific guide is available for determining severity of comorbidities, it is reasonable to withhold screening in patients who have greater mortality risk from other diseases.     We did discuss that the only way to prevent lung cancer is to not smoke. Smoking cessation counseling was given, duration > 10 minutes.    [id number]}              Questions and concerns were answered to the patient's satisfaction.  she was provided with my contact information should new questions or concerns arise in the interim.  I spent a total of 30 minutes face to face with Jaimee Rodriguez during today's office visit. Over 50% of this time was spent counseling the patient and/or coordinating care regarding their pulmonary disease.      she should return to clinic in 6 months  Up to date on Pneumovax (2014), Prevnar (2015)  Ten Rey MD  Pulmonary, Critical Care and Sleep Medicine  [facility]  Pager: [phone]        The above note was dictated using voice  recognition software and may include typographical errors. Please contact the author for any clarifications.

## 2021-12-13 NOTE — PATIENT INSTRUCTIONS
Referral to ENT for evaluation of the hoarseness and sore throat symptoms.    Begin Omeprazole 20 mg once daily before a meal for possible acid impact on symptoms.    Take course of prednisone for inflammation.    Mycostatin swish and swallow 4 times a day.

## 2021-12-13 NOTE — PROGRESS NOTES
Assessment & Plan     Sore throat  Hoarseness  3 to 4 weeks of symptoms with negative strep test as noted at an external site.  Irritation in the posterior pharynx is noted without significant postnasal drainage.  She does have Flonase which she will continue to use.  In addition I have recommended referral to ENT for evaluation of the area.  Before the ENT evaluation she will begin omeprazole and attempt to rule out GERD as a contributing factor to her symptoms due to the prolonged nature of them.  - Otolaryngology Referral; Future  - omeprazole (PRILOSEC) 20 MG DR capsule; Take 1 capsule (20 mg) by mouth daily 30-60 min prior to first meal of day    Pharyngitis, unspecified etiology  She is on inhaled steroids and so did consider the possibility of thrush overgrowth as a cause for her symptoms.  There is a minimal amount of whitening in the posterior pharynx which could be consistent with that.  Treatment with nystatin suspension to swish and swallow is given.  ENT referral as noted above.  And PPI for acid suppression.  - nystatin (MYCOSTATIN) 117317 UNIT/ML suspension; Take 5 mLs (500,000 Units) by mouth 4 times daily for 10 days  - omeprazole (PRILOSEC) 20 MG DR capsule; Take 1 capsule (20 mg) by mouth daily 30-60 min prior to first meal of day    Cough  Cough and mild increase in shortness of breath.  She has used prednisone 40 mg daily for 5 days in the past.  A prescription for this is given and the anti-inflammatory effect may benefit the sore throat and hoarseness.  She should still pursue the ENT evaluation regardless of her response to this.  And she is aware of that.  - predniSONE (DELTASONE) 20 MG tablet; Take 2 tablets (40 mg) by mouth daily for 5 days    High priority for 2019-nCoV vaccine  Booster COVID-19 due to her high risk with her severe COPD.  - COVID-19,PF,PFIZER (12+ Yrs PURPLE LABEL)    Prescription drug management         BMI:   Estimated body mass index is 32.59 kg/m  as calculated from  "the following:    Height as of 12/8/21: 1.6 m (5' 3\").    Weight as of this encounter: 83.5 kg (184 lb).   Weight management plan: Patient was referred to their PCP to discuss a diet and exercise plan.    Patient Instructions   Referral to ENT for evaluation of the hoarseness and sore throat symptoms.    Begin Omeprazole 20 mg once daily before a meal for possible acid impact on symptoms.    Take course of prednisone for inflammation.    Mycostatin swish and swallow 4 times a day.              Return in about 2 weeks (around 12/27/2021) for as needed for persistent symptoms.    Marion Solano MD  [facility] RENITA Hermosillo is a 56 year old who presents for the following health issues     HPI     56-year-old with history of severe COPD on supplemental oxygen presents with complaints of 3-4 weeks of sore throat/mild irritation in mouth.  Worsening symptoms so now have difficulty swallowing.  No emesis. No heart burn.  Hoarseness to voice over last 3-4 weeks as well.  Bilateral ear pain today - occasional symptoms present.   No cough.  Breathing is worse than normal but does not feel like related to COPD.  Saw pulmonary last week.  Last steroids in Aug/Sept she thinks.    No fever.          Review of Systems   Constitutional, HEENT, cardiovascular, pulmonary, gi and gu systems are negative, except as otherwise noted.      Objective    BP (!) 135/93 (BP Location: Right arm)   Pulse 113   Temp 97.7  F (36.5  C)   Resp 20   Wt 83.5 kg (184 lb)   SpO2 95%   BMI 32.59 kg/m    Body mass index is 32.59 kg/m .  Physical Exam   GENERAL: alert, no distress, fatigued, appears older than stated age and on supplemental oxygen  HENT: normal cephalic/atraumatic, both ears: normal: no effusions, no erythema, normal landmarks, nose and mouth without ulcers or lesions, nasal mucosa edematous , oral mucous membranes moist and mild erythema edema and irritation posterior pharynx with isolated area of " "whitening noted.  NECK: no adenopathy, no asymmetry, masses, or scars and thyroid normal to palpation  RESP: decreased breath sounds throughout and no wheezing or consolidation noted  CV: regular rate and rhythm, normal S1 S2, no S3 or S4, no murmur, click or rub, no peripheral edema and peripheral pulses strong  MS: no gross musculoskeletal defects noted, no edema  BACK: no CVA tenderness, no paralumbar tenderness  PSYCH: mentation appears normal, affect normal/bright    THROAT RAPID STREP A ANTIGEN \" \" Negative    Resulting Agency  [facility]   Specimen Collected: 12/05/21  4:17 PM Last Resulted: 12/05/21  4:23 PM   [facility]  Result Received: 12/13/21 11:20 AM               FYI to PCP         This chart was documented by provider using a voice activated software called Dragon in addition to manual typing. There may be vocabulary errors or other grammatical errors due to this.     "

## 2021-12-23 PROBLEM — Z86.79 HISTORY OF HYPERTENSION: Status: ACTIVE | Noted: 2021-01-01

## 2021-12-23 PROBLEM — F11.90 CHRONIC, CONTINUOUS USE OF OPIOIDS: Status: ACTIVE | Noted: 2021-01-01

## 2021-12-23 PROBLEM — D64.9 NORMOCYTIC ANEMIA: Status: ACTIVE | Noted: 2021-01-01

## 2021-12-23 PROBLEM — I10 HYPERTENSION GOAL BP (BLOOD PRESSURE) < 140/90: Status: RESOLVED | Noted: 2019-06-20 | Resolved: 2021-01-01

## 2021-12-23 NOTE — PROGRESS NOTES
[address] [address]  Phone: [phone]  Primary Provider: Maki Clayton  Pre-op Performing Provider: MAKI CLAYTON      PREOPERATIVE EVALUATION:  Today's date: 12/23/2021    Jaimee Rodriguez is a 56 year old female who presents for a preoperative evaluation.    Surgical Information:  Surgery/Procedure: Laryngoscopy w/Biopsy   Surgery Location: [facility]   Surgeon: Dr. Mac Diaz   Surgery Date: 12/30/21  Time of Surgery: 9:30 am   Where patient plans to recover: At home with family  Fax number for surgical facility: [phone]    Type of Anesthesia Anticipated: General    Assessment & Plan     The proposed surgical procedure is considered INTERMEDIATE risk.    Jaimee was seen today for pre-op exam.    Diagnoses and all orders for this visit:    Preop general physical exam  -     EKG 12-lead complete w/read - Clinics  -     CBC with platelets; Future  -     Comprehensive metabolic panel (BMP + Alb, Alk Phos, ALT, AST, Total. Bili, TP); Future  -     Comprehensive metabolic panel (BMP + Alb, Alk Phos, ALT, AST, Total. Bili, TP)  -     CBC with platelets    Throat mass  -     Comprehensive metabolic panel (BMP + Alb, Alk Phos, ALT, AST, Total. Bili, TP); Future  -     Comprehensive metabolic panel (BMP + Alb, Alk Phos, ALT, AST, Total. Bili, TP)    COPD, severe (H)    Hypoxia    O2 dependent    Normocytic anemia  -     Comprehensive metabolic panel (BMP + Alb, Alk Phos, ALT, AST, Total. Bili, TP); Future  -     Comprehensive metabolic panel (BMP + Alb, Alk Phos, ALT, AST, Total. Bili, TP)    Productive cough  -     XR Chest 2 Views; Future    History of hypertension  Comments:  Currently her blood pressure is normal without antihypertensives    Chronic, continuous use of opioids         Risks and Recommendations:  The patient has the following additional risks and recommendations for perioperative complications:   - Recurrent use of  steroids  Pulmonary:    - Incentive spirometry post-op   - Consider Respiratory Therapy (Respiratory Care IP Consult) post-op   - Oxygen dependent lung disease    Medication Instructions:  Patient is to take all scheduled medications on the day of surgery   - LABA, inhaled corticosteroid, long-acting anticholinergics: Continue without modification.   - rescue Inhaler: Continue PRN. Bring to hospital on the day of surgery.    RECOMMENDATION:  APPROVAL GIVEN to proceed with proposed procedure, without further diagnostic evaluation.    Review of external notes as documented above       Subjective     HPI related to upcoming procedure: Patient was 6 to 8-week history of hoarseness, evaluated by ENT.  Neck CT revealed a irregular mass at the base of the tongue that suspicious for squamous cell carcinoma.  Patient is scheduled for laryngoscopy with biopsy.    Patient with severe COPD and oxygen dependence.  She is on LABA.  History of chronic cough.  She noted there is increased sputum, no change in color.  At baseline she is on maintenance azithromycin 3 days a week.  She follows with pulmonology.  Her lung condition has been relatively stable in the last few months.  Patient denies any fevers or chills.  Oxygen use has been stable.  She has not been exposed to COVID-19 infection.  She has had COVID-19 vaccine with booster dose.  She is scheduled for preprocedure Covid testing.    Patient has history of chronic pain, continuous opioid use with fentanyl patch and Vicodin.    Preop Questions 12/23/2021   1. Have you ever had a heart attack or stroke? No   2. Have you ever had surgery on your heart or blood vessels, such as a stent placement, a coronary artery bypass, or surgery on an artery in your head, neck, heart, or legs? No   3. Do you have chest pain with activity? No   4. Do you have a history of  heart failure? No   5. Do you currently have a cold, bronchitis or symptoms of other infection? No   6. Do you have a  cough, shortness of breath, or wheezing? No   7. Do you or anyone in your family have previous history of blood clots? No   8. Do you or does anyone in your family have a serious bleeding problem such as prolonged bleeding following surgeries or cuts? No   9. Have you ever had problems with anemia or been told to take iron pills? No   10. Have you had any abnormal blood loss such as black, tarry or bloody stools, or abnormal vaginal bleeding? No   11. Have you ever had a blood transfusion? No   12. Are you willing to have a blood transfusion if it is medically needed before, during, or after your surgery? Yes   13. Have you or any of your relatives ever had problems with anesthesia? No   14. Do you have sleep apnea, excessive snoring or daytime drowsiness? No   15. Do you have any artifical heart valves or other implanted medical devices like a pacemaker, defibrillator, or continuous glucose monitor? No   16. Do you have artificial joints? No   17. Are you allergic to latex? No   18. Is there any chance that you may be pregnant? No     Health Care Directive:  Patient does not have a Health Care Directive or Living Will: Discussed advance care planning with patient; however, patient declined at this time.    Preoperative Review of :   reviewed - controlled substances reflected in medication list.          Review of Systems  ROS:  Constitutional, HEENT, cardiovascular, pulmonary, gi and gu systems are negative, except as otherwise noted.     Patient Active Problem List    Diagnosis Date Noted     COPD, severe (H) 03/15/2015     Priority: High     Hypoxia 03/15/2015     Priority: High     O2 dependent 03/15/2015     Priority: High     Major depression in partial remission 03/04/2013     Priority: High     Has psychiatrist: failed Fetizma (ineffective), Mitazirpine (angry), fluoxetine (ineffective), sertraline (ineffective), wellbutrin (ineffective), Lexapro (ineffective), Cymbalta (ineffective).        Chronic  neck pain 10/28/2009     Priority: High     Patient is followed by Dr. Barnes at [facility] for pain management as of 9.4.2013. Patient has pain pump put in on 8/12/2013, mixed of fentanyl and bupivacaine through the pump.   Previous management with Dr. Clayton has stopped as of 9/4/2013. Restarted 1/2014.     Patient is followed by JAVIER CLAYTON for ongoing prescription of pain medication.  All refills should be approved by this provider, or covering partner.    Medication(s): fentaly patch 100 mcg q48 hours and hydrocodone/acetaminophin 10/325 mg QID.   Maximum quantity per month: 15 patches, 120 tabs of vicodin.   Clinic visit frequency required: Q 6  months     Controlled substance agreement on file: Yes       Date(s): 2/10/2012    Pain Clinic evaluation in the past: Yes       Date/Location:  [facility] 2013. Failed pain pump due to pump site infection in 12/2013    DIRE Total Score(s):  No flowsheet data found.    Last [facility] website verification:  done on 1/4/16   [email or web address]           Chronic, continuous use of opioids 12/23/2021     Priority: Medium     History of hypertension 12/23/2021     Priority: Medium     Currently her blood pressure is normal without antihypertensives       Normocytic anemia 12/23/2021     Priority: Medium     Acute bronchitis with coexisting condition requiring prophylactic treatment 06/20/2019     Priority: Medium     Personal history of tobacco use, presenting hazards to health 05/25/2018     Priority: Medium     Former smoker 03/15/2015     Priority: Medium     Quite 3/4/2015       Chronic insomnia 03/15/2015     Priority: Medium     Emphysema lung (H) 11/18/2014     Priority: Medium     Depression with anxiety 07/14/2014     Priority: Medium     Fusion of spine of cervical region 04/18/2013     Priority: Medium     CARDIOVASCULAR SCREENING; LDL GOAL LESS THAN 160 08/08/2013     Priority: Low     Obesity 01/07/2013     Priority: Low     Other acne 05/23/2005     Priority: Low       Past Medical History:   Diagnosis Date     Cervical spondylosis without myelopathy     With radiculopathy.     Contact dermatitis and other eczema, due to unspecified cause     eczema     Endometriosis, site unspecified     R uterosacral ligament/surgically removed     Irregular menstrual cycle      NONSPECIFIC MEDICAL HISTORY     Mood swings, Irritability     Other acne      Other and unspecified ovarian cyst     sydni R     Tobacco use disorder 2001     Past Surgical History:   Procedure Laterality Date     HC ENLARGE BREAST WITH IMPLANT       SURGICAL HISTORY OF -   10/18/2005    C6 corpectomy with iliac crest autograft fixation. [facility]     ZC APPENDECTOMY       ZC  DELIVERY ONLY  ,,    , Low Cervical     ZZC LIGATE FALLOPIAN TUBE      S/P tubal ligation     ZZC TOTAL ABDOM HYSTERECTOMY      not total     pt did not have ovaries removed     Current Outpatient Medications   Medication Sig Dispense Refill     albuterol (PROAIR HFA/PROVENTIL HFA/VENTOLIN HFA) 108 (90 Base) MCG/ACT inhaler Inhale 2 puffs into the lungs every 6 hours as needed for shortness of breath / dyspnea or wheezing 18 g 11     ammonium lactate (AMLACTIN) 12 % external cream APPLY DAILY AS NEEDED TO FEET FOR DRY SKIN       azithromycin (ZITHROMAX) 250 MG tablet Take 2 tablets (500 mg) by mouth Every Mon, Wed, Fri Morning 24 tablet 3     buPROPion (ZYBAN) 150 MG 12 hr tablet TAKE 1 TABLET(150 MG) BY MOUTH TWICE DAILY 180 tablet 0     diclofenac (VOLTAREN) 1 % topical gel Place 2 g onto the skin 4 times daily 100 g 1     fentaNYL (DURAGESIC) 100 mcg/hr 72 hr patch Place 1 patch onto the skin every 72 hours (Mylan brand) 10 patch 0     fluticasone (FLONASE) 50 MCG/ACT nasal spray Spray 1 spray into both nostrils daily 16 g 11     fluticasone-salmeterol (ADVAIR-HFA) 230-21 MCG/ACT inhaler Inhale 2 puffs into the lungs 2 times daily 12 g 11     guaiFENesin (MUCINEX) 600 MG 12 hr tablet  Take 2 tablets (1,200 mg) by mouth 2 times daily 120 tablet 3     HYDROcodone-acetaminophen (NORCO)  MG per tablet Take 1-2 tablets by mouth every 6 hours as needed for moderate to severe pain 150 tablet 0     naloxone (NARCAN) 4 MG/0.1ML nasal spray Spray 1 spray (4 mg) into one nostril alternating nostrils as needed for opioid reversal every 2-3 minutes until assistance arrives 0.2 mL 0     order for DME Equipment being ordered: Nebulizer 1 Application 0     ORDER FOR DME Equipment being ordered: Oxygen 4 lpm via nasal cannula continuous flow with any exertion and 2 lpm continuous flow with nasal cannula at rest and at night. Provide portability.  Length of need 99. 1 Device 1     tiotropium (SPIRIVA RESPIMAT) 2.5 MCG/ACT inhaler Inhale 2 puffs into the lungs daily 12 g 11     tiZANidine (ZANAFLEX) 4 MG tablet Take 1 tablet (4 mg) by mouth 3 times daily as needed for muscle spasms (Due for follow up with PCP for future refills) 90 tablet 0       Allergies   Allergen Reactions     Morphine         Social History     Tobacco Use     Smoking status: Former Smoker     Packs/day: 0.75     Years: 40.00     Pack years: 30.00     Types: Cigarettes     Quit date: 2021     Years since quittin.6     Smokeless tobacco: Never Used     Tobacco comment: started age 14.   Substance Use Topics     Alcohol use: Yes     Alcohol/week: 0.0 standard drinks     Comment: occ     Family History   Problem Relation Age of Onset     Cerebrovascular Disease Mother      Chronic Obstructive Pulmonary Disease Mother      Arthritis Father      Diabetes Maternal Grandmother      Cancer Maternal Aunt         breast     History   Drug Use No         Objective     /76 (BP Location: Right arm, Patient Position: Sitting, Cuff Size: Adult Regular)   Pulse 111   Resp 20   Wt 83.2 kg (183 lb 5 oz)   SpO2 93%   BMI 32.47 kg/m      Physical Exam  GENERAL APPEARANCE: healthy, alert and no distress  HENT: ear canals and TM's normal  and nose and mouth without ulcers or lesions  RESP: Intermittent wet sounding cough, no tachypnea, O2 via nasal cannula, poor air movement.  CV: regular rate and rhythm, normal S1 S2, no S3 or S4 and no murmur, click or rub   ABDOMEN: soft, nontender, no HSM or masses and bowel sounds normal  Ext: mild pitting edema in both LE.   NEURO: Normal strength and tone, sensory exam grossly normal, mentation intact and speech normal    Diagnostics:  Labs pending at this time.  Results will be reviewed when available.   EKG: Sinus rhythm, nonspecific ST abnormality      EXAM: CT NECK SOFT TISSUE W CON  (done at [facility])    DATE: 12/21/2021 11:24 AM     CLINICAL DATA:  J38.7 Other diseases of larynx     COMPARISON:  None.     TECHNIQUE: Thin-section helical images of the neck were obtained from the external auditory canals through the superior mediastinum during infusion of intravenous contrast.     CONTRAST: 100 mL of intravenous Omnipaque 350.     FINDINGS:     AERODIGESTIVE TRACT:   There is an irregular mass with central hypoattenuation is primarily centered in the vallecula with extension to the base of tongue and lingual surface of the epiglottis and continuation inferiorly through the periepiglottic fat and aryepiglottic folds to the level of the vocal folds extending to the anterior commissure. There is likely involvement of the postcricoid wall. No extension beyond the thyroid cartilage. No infraglottic extension.     LYMPH NODES:   No pathologically enlarged or morphologically suspicious cervical lymph nodes.     PAROTID GLANDS:   Normal.     SUBMANDIBULAR GLANDS:   Normal.     THYROID:   Normal.     VESSELS:   Mild scattered vascular consultations are noted. There is retropharyngeal course of the right internal carotid artery.     PARANASAL SINUSES/MASTOID AIR CELLS:   Predominantly clear.     LUNG APICES:   Severe centrilobular emphysema. Several partially calcified mediastinal lymph nodes.     BONES:   Postsurgical  changes including prior C6 corpectomy along with C5-C7 posterior instrumented fusion. There is also prior C3-C5 anterior cervical fusion with interbody grafts. No acute osseous abnormality. No concerning focal lesion.     VISIBLE INTRACRANIAL CONTENTS:   Unremarkable.    Revised Cardiac Risk Index (RCRI):  The patient has the following serious cardiovascular risks for perioperative complications:   - No serious cardiac risks = 0 points     RCRI Interpretation: 0 points: Class I (very low risk - 0.4% complication rate)           Signed Electronically by: Maki Topete MD PhD  Copy of this evaluation report is provided to requesting physician.

## 2021-12-23 NOTE — PATIENT INSTRUCTIONS
.Before your surgery:  10 days before: stop all over the counter supplements  1 week before: stop aspirin if you are taking aspirin.  2 days before: stop ibuprofen, naproxen or similar antiinflammatory medications. You may use Tylenol for pain or headache.     On the day of your surgery:  You may take all scheduled medications with the smallest amount of water possible.     After surgery:   You may resume all your medications after the surgery unless your surgeon instructs you otherwise.     Preparing for Your Surgery  Getting started  A nurse will call you to review your health history and instructions. They will give you an arrival time based on your scheduled surgery time. Please be ready to share:    Your doctor's clinic name and phone number    Your medical, surgical and anesthesia history    A list of allergies and sensitivities    A list of medicines, including herbal treatments and over-the-counter drugs    Whether the patient has a legal guardian (ask how to send us the papers in advance)  Please tell us if you're pregnant--or if there's any chance you might be pregnant. Some surgeries may injure a fetus (unborn baby), so they require a pregnancy test. Surgeries that are safe for a fetus don't always need a test, and you can choose whether to have one.   If you have a child who's having surgery, please ask for a copy of Preparing for Your Child's Surgery.    Preparing for surgery    Within 30 days of surgery: Have a pre-op exam (sometimes called an H&P, or History and Physical). This can be done at a clinic or pre-operative center.  ? If you're having a , you may not need this exam. Talk to your care team.    At your pre-op exam, talk to your care team about all medicines you take. If you need to stop any medicines before surgery, ask when to start taking them again.  ? We do this for your safety. Many medicines can make you bleed too much during surgery. Some change how well surgery (anesthesia)  drugs work.    Call your insurance company to let them know you're having surgery. (If you don't have insurance, call [phone].)    Call your clinic if there's any change in your health. This includes signs of a cold or flu (sore throat, runny nose, cough, rash, fever). It also includes a scrape or scratch near the surgery site.    If you have questions on the day of surgery, call your hospital or surgery center.  COVID testing  You may need to be tested for COVID-19 before having surgery. If so, we will give you instructions.  Eating and drinking guidelines  For your safety: Unless your surgeon tells you otherwise, follow the guidelines below.    Eat and drink as usual until 8 hours before surgery. After that, no food or milk.    Drink clear liquids until 2 hours before surgery. These are liquids you can see through, like water, Gatorade and Propel Water. You may also have black coffee and tea (no cream or milk).    Nothing by mouth within 2 hours of surgery. This includes gum, candy and breath mints.    If you drink alcohol: Stop drinking it the night before surgery.    If your care team tells you to take medicine on the morning of surgery, it's okay to take it with a sip of water.  Preventing infection    Shower or bathe the night before and morning of your surgery. Follow the instructions your clinic gave you. (If no instructions, use regular soap.)    Don't shave or clip hair near your surgery site. We'll remove the hair if needed.    Don't smoke or vape the morning of surgery. You may chew nicotine gum up to 2 hours before surgery. A nicotine patch is okay.  ? Note: Some surgeries require you to completely quit smoking and nicotine. Check with your surgeon.    Your care team will make every effort to keep you safe from infection. We will:  ? Clean our hands often with soap and water (or an alcohol-based hand rub).  ? Clean the skin at your surgery site with a special soap that kills germs.  ? Give you a  special gown to keep you warm. (Cold raises the risk of infection.)  ? Wear special hair covers, masks, gowns and gloves during surgery.  ? Give antibiotic medicine, if prescribed. Not all surgeries need antibiotics.  What to bring on the day of surgery    Photo ID and insurance card    Copy of your health care directive, if you have one    Glasses and hearing aides (bring cases)  ? You can't wear contacts during surgery    Inhaler and eye drops, if you use them (tell us about these when you arrive)    CPAP machine or breathing device, if you use them    A few personal items, if spending the night    If you have . . .  ? A pacemaker, ICD (cardiac defibrillator) or other implant: Bring the ID card.  ? An implanted stimulator: Bring the remote control.  ? A legal guardian: Bring a copy of the certified (court-stamped) guardianship papers.  Please remove any jewelry, including body piercings. Leave jewelry and other valuables at home.  If you're going home the day of surgery    You must have a responsible adult drive you home. They should stay with you overnight as well.    If you don't have someone to stay with you, and you aren't safe to go home alone, we may keep you overnight. Insurance often won't pay for this.  After surgery  If it's hard to control your pain or you need more pain medicine, please call your surgeon's office.  Questions?   If you have any questions for your care team, list them here: _________________________________________________________________________________________________________________________________________________________________________ ____________________________________ ____________________________________ ____________________________________  For informational purposes only. Not to replace the advice of your health care provider. Copyright   2003, 2019 [facility]. All rights reserved. Clinically reviewed by Bisi Vincent MD. SMART[employer] [id number] - REV 07/21.

## 2021-12-24 NOTE — PROGRESS NOTES
Results for orders placed or performed in visit on 12/23/21   XR Chest 2 Views     Status: None    Narrative    CHEST TWO VIEWS 12/23/2021 4:56 PM     HISTORY: Productive cough.    COMPARISON: 3/22/2021    FINDINGS: Heart size and pulmonary vascularity are within normal  limits. The lungs are clear. No pneumothorax or pleural effusion.       Impression    IMPRESSION: No radiographic evidence of acute chest abnormality.     ADRIEL GIBBS MD         SYSTEM ID:  [id number]   Results for orders placed or performed in visit on 12/23/21   CBC with platelets     Status: Abnormal   Result Value Ref Range    WBC Count 10.3 4.0 - 11.0 10e3/uL    RBC Count 3.88 3.80 - 5.20 10e6/uL    Hemoglobin 11.0 (L) 11.7 - 15.7 g/dL    Hematocrit 37.9 35.0 - 47.0 %    MCV 98 78 - 100 fL    MCH 28.4 26.5 - 33.0 pg    MCHC 29.0 (L) 31.5 - 36.5 g/dL    RDW 13.1 10.0 - 15.0 %    Platelet Count 307 150 - 450 10e3/uL     Labs acceptable for surgery.     Maki Topete MD PhD

## 2021-12-24 NOTE — RESULT ENCOUNTER NOTE
Dear Jaimee,   Your recent test results showed the following:  -- labs are normal or at baseline, acceptable for surgery.     Please call or Mychart to our office if you have further questions.     Maki Topete MD-PhD

## 2021-12-27 NOTE — TELEPHONE ENCOUNTER
Pt  would like a hard copy of pre-op to be picked up at the [facility] . Please call him when this is ready.   His number is [phone]

## 2022-01-01 ENCOUNTER — TELEPHONE (OUTPATIENT)
Dept: FAMILY MEDICINE | Facility: CLINIC | Age: 57
End: 2022-01-01

## 2022-01-01 ENCOUNTER — MEDICAL CORRESPONDENCE (OUTPATIENT)
Dept: HEALTH INFORMATION MANAGEMENT | Facility: CLINIC | Age: 57
End: 2022-01-01

## 2022-01-01 ENCOUNTER — HEALTH MAINTENANCE LETTER (OUTPATIENT)
Age: 57
End: 2022-01-01

## 2022-01-01 ENCOUNTER — TELEPHONE (OUTPATIENT)
Dept: FAMILY MEDICINE | Facility: CLINIC | Age: 57
End: 2022-01-01
Payer: COMMERCIAL

## 2022-01-01 ENCOUNTER — MYC MEDICAL ADVICE (OUTPATIENT)
Dept: FAMILY MEDICINE | Facility: CLINIC | Age: 57
End: 2022-01-01
Payer: COMMERCIAL

## 2022-01-01 ENCOUNTER — VIRTUAL VISIT (OUTPATIENT)
Dept: FAMILY MEDICINE | Facility: CLINIC | Age: 57
End: 2022-01-01
Payer: COMMERCIAL

## 2022-01-01 ENCOUNTER — VIRTUAL VISIT (OUTPATIENT)
Dept: PULMONOLOGY | Facility: CLINIC | Age: 57
End: 2022-01-01
Payer: COMMERCIAL

## 2022-01-01 ENCOUNTER — TELEPHONE (OUTPATIENT)
Dept: PULMONOLOGY | Facility: CLINIC | Age: 57
End: 2022-01-01
Payer: COMMERCIAL

## 2022-01-01 ENCOUNTER — TRANSFERRED RECORDS (OUTPATIENT)
Dept: HEALTH INFORMATION MANAGEMENT | Facility: CLINIC | Age: 57
End: 2022-01-01
Payer: COMMERCIAL

## 2022-01-01 DIAGNOSIS — J43.9 PULMONARY EMPHYSEMA, UNSPECIFIED EMPHYSEMA TYPE (H): ICD-10-CM

## 2022-01-01 DIAGNOSIS — C09.9 SQUAMOUS CELL CARCINOMA OF TONSIL (H): ICD-10-CM

## 2022-01-01 DIAGNOSIS — Z79.891 LONG TERM PRESCRIPTION OPIATE USE: ICD-10-CM

## 2022-01-01 DIAGNOSIS — F11.90 CHRONIC, CONTINUOUS USE OF OPIOIDS: ICD-10-CM

## 2022-01-01 DIAGNOSIS — M54.2 CHRONIC NECK PAIN: ICD-10-CM

## 2022-01-01 DIAGNOSIS — Z93.1 GASTROSTOMY STATUS (H): ICD-10-CM

## 2022-01-01 DIAGNOSIS — F11.90 CHRONIC, CONTINUOUS USE OF OPIOIDS: Primary | ICD-10-CM

## 2022-01-01 DIAGNOSIS — F51.04 CHRONIC INSOMNIA: ICD-10-CM

## 2022-01-01 DIAGNOSIS — C02.9 SQUAMOUS CELL CANCER OF TONGUE (H): Primary | ICD-10-CM

## 2022-01-01 DIAGNOSIS — G89.29 CHRONIC NECK PAIN: ICD-10-CM

## 2022-01-01 DIAGNOSIS — C76.0 HEAD AND NECK CANCER (H): ICD-10-CM

## 2022-01-01 DIAGNOSIS — M43.22 FUSION OF SPINE OF CERVICAL REGION: ICD-10-CM

## 2022-01-01 DIAGNOSIS — Z93.0 TRACHEOSTOMY STATUS (H): ICD-10-CM

## 2022-01-01 DIAGNOSIS — Z99.81 O2 DEPENDENT: ICD-10-CM

## 2022-01-01 DIAGNOSIS — F33.0 MAJOR DEPRESSIVE DISORDER, RECURRENT EPISODE, MILD (H): ICD-10-CM

## 2022-01-01 DIAGNOSIS — F11.20 CONTINUOUS OPIOID DEPENDENCE (H): ICD-10-CM

## 2022-01-01 DIAGNOSIS — J96.21 ACUTE ON CHRONIC RESPIRATORY FAILURE WITH HYPOXIA (H): Primary | ICD-10-CM

## 2022-01-01 DIAGNOSIS — R09.02 HYPOXIA: Primary | ICD-10-CM

## 2022-01-01 DIAGNOSIS — Z98.1 STATUS POST CERVICAL SPINAL ARTHRODESIS: ICD-10-CM

## 2022-01-01 DIAGNOSIS — Z53.9 DIAGNOSIS NOT YET DEFINED: Primary | ICD-10-CM

## 2022-01-01 DIAGNOSIS — Z79.891 LONG TERM CURRENT USE OF OPIATE ANALGESIC: ICD-10-CM

## 2022-01-01 PROCEDURE — G0180 MD CERTIFICATION HHA PATIENT: HCPCS | Performed by: INTERNAL MEDICINE

## 2022-01-01 PROCEDURE — 99214 OFFICE O/P EST MOD 30 MIN: CPT | Mod: 95 | Performed by: INTERNAL MEDICINE

## 2022-01-01 PROCEDURE — 99496 TRANSJ CARE MGMT HIGH F2F 7D: CPT | Performed by: PHYSICIAN ASSISTANT

## 2022-01-01 RX ORDER — SCOLOPAMINE TRANSDERMAL SYSTEM 1 MG/1
1 PATCH, EXTENDED RELEASE TRANSDERMAL
Qty: 10 PATCH | Refills: 0 | Status: SHIPPED | OUTPATIENT
Start: 2022-01-01 | End: 2022-01-01

## 2022-01-01 RX ORDER — SCOLOPAMINE TRANSDERMAL SYSTEM 1 MG/1
1 PATCH, EXTENDED RELEASE TRANSDERMAL
COMMUNITY
Start: 2022-01-01 | End: 2022-01-01

## 2022-01-01 RX ORDER — HYDROCODONE BITARTRATE AND ACETAMINOPHEN 10; 325 MG/1; MG/1
1-2 TABLET ORAL EVERY 6 HOURS PRN
Qty: 150 TABLET | Refills: 0 | Status: SHIPPED | OUTPATIENT
Start: 2022-01-01 | End: 2022-01-01

## 2022-01-01 RX ORDER — QUETIAPINE FUMARATE 50 MG/1
50 TABLET, FILM COATED ORAL 2 TIMES DAILY
COMMUNITY
Start: 2022-01-01 | End: 2022-01-01

## 2022-01-01 RX ORDER — LACTULOSE 10 G/10G
10 SOLUTION ORAL DAILY
COMMUNITY
Start: 2022-01-01

## 2022-01-01 RX ORDER — QUETIAPINE FUMARATE 50 MG/1
150 TABLET, FILM COATED ORAL AT BEDTIME
Qty: 90 TABLET | Refills: 0 | Status: SHIPPED | OUTPATIENT
Start: 2022-01-01

## 2022-01-01 RX ORDER — FENTANYL 25 UG/1
1 PATCH TRANSDERMAL
Refills: 0 | COMMUNITY
Start: 2022-01-01

## 2022-01-01 RX ORDER — FAMOTIDINE 20 MG/1
20 TABLET, FILM COATED ORAL 2 TIMES DAILY
COMMUNITY
Start: 2022-01-01

## 2022-01-01 RX ORDER — HYDROCODONE BITARTRATE AND ACETAMINOPHEN 5; 217 MG/10ML; MG/10ML
10 SOLUTION ORAL 4 TIMES DAILY PRN
Qty: 400 ML | Refills: 0 | Status: SHIPPED | OUTPATIENT
Start: 2022-01-01 | End: 2022-01-01

## 2022-01-01 RX ORDER — FENTANYL 100 UG/1
1 PATCH TRANSDERMAL
Qty: 10 PATCH | Refills: 0 | Status: SHIPPED | OUTPATIENT
Start: 2022-01-01 | End: 2022-01-01

## 2022-01-01 RX ORDER — QUETIAPINE FUMARATE 50 MG/1
150 TABLET, FILM COATED ORAL AT BEDTIME
COMMUNITY
Start: 2022-01-01 | End: 2022-01-01

## 2022-01-01 RX ORDER — QUETIAPINE FUMARATE 50 MG/1
50 TABLET, FILM COATED ORAL 2 TIMES DAILY
COMMUNITY
Start: 2022-01-01

## 2022-01-01 RX ORDER — HYDROCODONE BITARTRATE AND ACETAMINOPHEN 7.5; 325 MG/15ML; MG/15ML
10 SOLUTION ORAL 4 TIMES DAILY PRN
Qty: 400 ML | Refills: 0 | Status: SHIPPED | OUTPATIENT
Start: 2022-01-01 | End: 2022-01-01

## 2022-01-01 RX ORDER — METOPROLOL TARTRATE 25 MG/1
25 TABLET, FILM COATED ORAL 2 TIMES DAILY
COMMUNITY
Start: 2022-01-01

## 2022-01-01 ASSESSMENT — ANXIETY QUESTIONNAIRES
6. BECOMING EASILY ANNOYED OR IRRITABLE: NEARLY EVERY DAY
4. TROUBLE RELAXING: SEVERAL DAYS
GAD7 TOTAL SCORE: 8
GAD7 TOTAL SCORE: 8
2. NOT BEING ABLE TO STOP OR CONTROL WORRYING: NOT AT ALL
1. FEELING NERVOUS, ANXIOUS, OR ON EDGE: NEARLY EVERY DAY
3. WORRYING TOO MUCH ABOUT DIFFERENT THINGS: NOT AT ALL
5. BEING SO RESTLESS THAT IT IS HARD TO SIT STILL: SEVERAL DAYS
IF YOU CHECKED OFF ANY PROBLEMS ON THIS QUESTIONNAIRE, HOW DIFFICULT HAVE THESE PROBLEMS MADE IT FOR YOU TO DO YOUR WORK, TAKE CARE OF THINGS AT HOME, OR GET ALONG WITH OTHER PEOPLE: NOT DIFFICULT AT ALL
7. FEELING AFRAID AS IF SOMETHING AWFUL MIGHT HAPPEN: NOT AT ALL

## 2022-01-19 NOTE — TELEPHONE ENCOUNTER
[facility] Call Center    Phone Message    May a detailed message be left on voicemail: no     Reason for Call: Other: Patients  is requesting a call back. Has some questions regarding surgery for patinet.  They need an answer today to hold a bed. Please advise. Thank you.     Action Taken: Message routed to:  Adult Clinics: Pulmonology p [id number]    Travel Screening: Not Applicable

## 2022-01-19 NOTE — TELEPHONE ENCOUNTER
Contacted patient's  Germán regarding pulmonary questions. Unable to reach Germán by telephone. VM left requesting CB to pulmonary to discuss questions.    Rodriguez Santos LPN  Pulmonary Medicine:  [facility]  Phone: [phone] Fax: [phone]

## 2022-01-19 NOTE — TELEPHONE ENCOUNTER
Patient's son is calling, His mom is changing pharmacies, and wants the fentanyl (son say this has to be brand name, the generic do not stick to her skin)and hydrocodone sent to the [facility]. .Manjula Doan

## 2022-01-19 NOTE — TELEPHONE ENCOUNTER
Sent patient a [employer] message re: Rx per Dr. LINDA Hernández     [facility]

## 2022-01-21 NOTE — TELEPHONE ENCOUNTER
Contacted patient's  Germán regarding  questions. Per Germán, patient's throat cancer has progressed and patient is rapidly declining. Germán and patient requesting to discuss throat cancer prognosis with Dr. Rey. Patient has questions regarding radiation therapy and COPD.    [facility], Dr. Jackson is recommending patient undergo procedure for trach, feeding tube, and recommending radiation for progressive throat cancer. Per Germán,  pt has become so weak that she is unable to get out of bed. Patient is only eating 1 applesauce cup per day. Patient has been added to Dr. Rey's schedule for Monday @ 1030 am to discuss pt prognosis. Germán agreeable to plan.    Rodriguez Santos LPN  Pulmonary Medicine:  [facility]  Phone: [phone] Fax: [phone]

## 2022-01-24 NOTE — PROGRESS NOTES
Jaimee is a 56 year old who is being evaluated via a billable telephone visit.      What phone number would you like to be contacted at? [phone]  How would you like to obtain your AVS? Duke Hernandez, EMT  Clinic Support  [facility]    [phone]    Employed by [facility] [employer]

## 2022-01-24 NOTE — PROGRESS NOTES
Jaimee is a 56 year old who is being evaluated via a billable telephone visit.      What phone number would you like to be contacted at? Mobile  How would you like to obtain your AVS? Vanesaroxannejamarcus Hermosillo is a 56 year old who presents for the following health issues  accompanied by her spouse. Most of my conversation was with Germán FLAHERTY   Jaimee Rodriguez is a 56 yr old female with a history of COPD with chronic home oxygen therapy on 4 LPM. I last saw her in clinic in 12/2021.  To briefly review, Jaimee was diagnosed with COPD about 6 years ago based on symptoms and spirometric evaluation. She rarely required hospitalizations but she normally requires outpatient prednisone treatments for a few days for flares. She became oxygen dependent in the last 3 years. She currently uses 2 LPM at rest and up to 4 LPM with activity. She is on triple inhaler regimen with high dose Advair and Spiriva.  Unfortunately, Jaimee was recently diagnosed with a head and neck mass which was causing some mass effect and speech affectation. The patient had a biopsy of her throat on 12/30/21 which came back cancerous- SCC. She was seen by oncology recently and was recommended to undergo chemoradiation which will require trach/peg but she and her spouse had some reservation with regards to her history of severe copd and quality of life.  I had called her on 1/21/2022 after speaking with her oncologist with regards her history of copd and advised that the cancer may cause significant respiratory failure with catastrophic complications if left untreated with the exception that she will like to be placed on hospice/palliative care.  She was admitted at [facility] on 1/21/22 for airway distress and underwent trach placement as well as PEG tube placement with plans to move forward with treatments.    Review of Systems   Constitutional, HEENT, cardiovascular, pulmonary, GI, , musculoskeletal, neuro, skin, endocrine and psych systems  are negative, except as otherwise noted.      Objective           Vitals:  No vitals were obtained today due to virtual visit.    Physical Exam   healthy, alert and no distress  PSYCH: Alert and oriented times 3; coherent speech, normal   rate and volume, able to articulate logical thoughts, able   to abstract reason, no tangential thoughts, no hallucinations   or delusions  Her affect is normal  RESP: No cough, no audible wheezing, able to talk in full sentences  Remainder of exam unable to be completed due to telephone visits        Assessment and Plan:   Severe COPD (Group D)  Continue with triple inhaler therapy as previously prescribed. She may benefit from nebulized bronchodilators in the future.    Hx of SCC head and neck/respiratory failure  S/p trach/peg, will continue with trach care as she plans for chemoradiation as per oncology. Continue O2 supplementation.      Pulmonary Nodules/History of smoking  Hold off on annual lung cancer screening in light of recent diagnosis of SCC head/neck    Questions and concerns were answered to the patient's satisfaction.  she was provided with my contact information should new questions or concerns arise in the interim.         She should return to clinic in 6 months    Up to date on Pneumovax (2014), Prevnar (2015)    Ten Rey MD  Pulmonary, Critical Care and Sleep Medicine  [facility]  Pager: [phone]          Phone call duration: 20 minutes

## 2022-03-03 PROBLEM — Z93.1 GASTROSTOMY STATUS (H): Status: ACTIVE | Noted: 2022-01-01

## 2022-03-03 PROBLEM — C09.9 SQUAMOUS CELL CARCINOMA OF TONSIL (H): Status: ACTIVE | Noted: 2022-01-01

## 2022-03-03 PROBLEM — Z93.0 TRACHEOSTOMY STATUS (H): Status: ACTIVE | Noted: 2022-01-01

## 2022-03-03 NOTE — TELEPHONE ENCOUNTER
Called pharmacy back, they have this available so can fill it today.     The script will actually need to be for 7.5-325mg/15 mL for the Norco. Pharmacist cannot take a verbal order for this since it is controlled - they need a new script sent in when able.     Thank you,  Fabi Lan RN, BSN  [facility]

## 2022-03-03 NOTE — TELEPHONE ENCOUNTER
Left VM on husbands line as requested by Ana Saini. Left number to call back with any questions.     Fabi Lan RN, BSN  [facility]

## 2022-03-03 NOTE — PATIENT INSTRUCTIONS
Give seroquel 150 mg at bedtime in addition to 50 mg twice a day for sleep   Take 10 ml hydrocodone in addition to fentanyl patch four times a day as needed for pain  Follow up with Dr. Maki Topete at 540 pm Thursday March 10 with a video visit   Return urgently if any change in symptoms.

## 2022-03-03 NOTE — TELEPHONE ENCOUNTER
[facility] pharmacy calling regarding hydrcodone prescription sent over today.     Prescription sent was a liquid suspension. Pharmacist states that this is not available at this pharmacy - they do have a liquid hydrocodone-acetaminophen solution 7.5-3.25 mg/15 mL.   If appropriate, pharmacy will need new script sent to them.     Pharmacy also wanted PCP to be aware that hydrocodone-acetaminophen tablets were dispensed two days ago with 15 tablets.     Any questions to call pharmacy at [phone]    To provider to review and advise.    Fabi Lan RN, BSN  [facility]

## 2022-03-03 NOTE — PROGRESS NOTES
Jaimee is a 56 year old who is being evaluated via a billable video visit.      How would you like to obtain your AVS? Mail a copy  If the video visit is dropped, the invitation should be resent by: Text to cell phone: [phone]  Will anyone else be joining your video visit? No      Video Start Time: 1100    Assessment & Plan     Hypoxia  Reason for admission- has trach dome on oxygen    Tracheostomy status (H)  Has trach with dome for oxygen     Gastrostomy status (H)   feeding through tube     Squamous cell carcinoma of tonsil (H)  Followed by oncology - next appointment 3/8/22 through [facility]    Major depressive disorder, recurrent episode, mild (H)  On seroquel- antidepressants were stopped.    O2 dependent  As above -severe COPD     Chronic insomnia  Should be taking 150 mg seroquel at night and 50 mg twice a day   Only sleeping 45 minutes at a time   - QUEtiapine (SEROQUEL) 50 MG tablet  Dispense: 90 tablet; Refill: 0  - scopolamine (TRANSDERM) 1 MG/3DAYS 72 hr patch  Dispense: 10 patch; Refill: 0    Chronic, continuous use of opioids  Fentanyl was decreased from 100 mcg to 25 mcg - patient requesting additional pain medication   Has 2 doses of narcan at home   Reviewed with PCP - continue fentanyl 25 mcg and add hydrocodone- tylenol 10 ml 4 times daily as needed     Chronic neck pain  Reason for chronic opioids       Review of external notes as documented elsewhere in note  Prescription drug management  60 minutes spent on the date of the encounter doing chart review, history and exam, documentation and further activities per the note           No follow-ups on file.    Ana Saini PA-C  [facility]   Jaimee is a 56 year old who presents for the following health issues  accompanied by her spouse.    \[facility]\""       Hospital Follow-up Visit:    Hospital/Nursing Home/IP Rehab Facility: [facility]  Date of Admission: 02/01/2022  Date of Discharge:  03/02/2022  Reason(s) for Admission: acute respiratory failure      Was your hospitalization related to COVID-19? No   Problems taking medications regularly:  None  Medication changes since discharge: multiple changes  Problems adhering to non-medication therapy:  None    Summary of hospitalization:  CareEverywhere information obtained and reviewed  Diagnostic Tests/Treatments reviewed.  Follow up needed: oncology, home care   Other Healthcare Providers Involved in Patient s Care:         Homecare and Specialist appointment - 3/8/22  Update since discharge: worsened.       Post Discharge Medication Reconciliation: discharge medications reconciled and changed, per note/orders.  Plan of care communicated with patient and family              Patient and  (doing the talking and messaging per patient written notes) unknown to me presents for hospital follow up   Patient with history of severe COPD and SCC base of tongue now has had airway compromise and tracheostomy dome with oxygen set at 4.5 to 5 liters 24/7.     Not using any inhalers  Significant medication changes since hospitalization.  All medications changed. - see medications historically entered by me   On lovenox,   Pepcid,   lactulose,   lopressor,   seroquel twice a day and at night - doesn't sound like giving bedtime seroquel (didn't get a prescription)   Transderm scopolamine patch1 patch every 72 hours - didn't get a prescription   No nausea or vomiting -   Home care has not contacted them yet. (was referred to [facility] through [facility])   She has a feeding tube and  is managing her care  Ended up in hospital because Couldn't breathe - throat cancer and breathing so bad had to   According to  patient is alert and writing thoughts and questions on paper -unable to talk-awake more than she sleeps  Has appointment with oncology 3/8/22 appointment with imaging first and then will see MD    Still very weak and any exertion  depletes her oxygen but moving around, Picking up things and writing - coherent   Not sleeping through night - only sleeps 45 minutes to an hour then awake- complains of neck pain  Sleeping issue since problems with arthritis and pain- over a year   Fentanyl- cut back to 1/4 of previous dose.  Was on 100 mcg now on 25 mcg along with hydrocodone- complaining of pain in neck  Has never seen a pain clinic   Requiring frequent suction of yellowish mucous from throat   Patient is asking for increase in pain med   Pain in back of neck- history of 2 neck fusions and arthritis had pain pump installed at one time but had to be Removed because staph infection.    Dr. Topete had her on pain medications because of neck issues  [facility] would not deal with pain med issues  Dr. Topete has been managing her pain   has 2 shots of narcan available  Patient writes that it doesn' have to be increase in fentanyl patch- could be liquid through feeding tube     Review of Systems   Constitutional, HEENT, cardiovascular, pulmonary, gi and gu systems are negative, except as otherwise noted.      Objective           Vitals:  No vitals were obtained today due to virtual visit.    Physical Exam   GENERAL: alert, no distress and obese- communicating by writing  EYES: Eyes grossly normal to inspection.  No discharge or erythema, or obvious scleral/conjunctival abnormalities.  RESP: No audible wheeze, cough, or visible cyanosis.  No visible retractions or increased work of breathing.    SKIN: Visible skin clear. No significant rash, abnormal pigmentation or lesions.  PSYCH: affect flat, appearance well groomed and has trach and communicating by writing and  communicating                   Video-Visit Details    Type of service:  Video Visit    Video End Time:1127     Originating Location (pt. Location): Home    Distant Location (provider location):  [facility]     Platform used for Video Visit: [employer]

## 2022-03-03 NOTE — TELEPHONE ENCOUNTER
Message   Recorded as Task   Date: 05/05/2017 11:15 AM, Created By: Leigh Morrissey   Task Name: Follow Up   Assigned To: Leigh Morrissey   Regarding Patient: NEAL MORLEY, Status: Active   Comment:    Leigh Morrissey - 05 May 2017 11:15 AM     TASK CREATED  Post operative phone call: 5/5/17  Phone number/contact name: #[phone]    Procedure: Rt nasal polypectomy, rt endo total ethmoid, rt endo max antrostomy  Date: 5/4/17  Surgeon: ROSELYN  Facility: [facility]    Post operative appointment date, time and provider: 5/10/17 @ 10:40am with [facility]    1st attempt to contact: 5/5/17- Spoke with pt, reports pain is controlled with medication as rx'd. Mustache dressing changed x2 today and minimal bleeding. Denies swelling. Sleeping elevated. Eating, drinking and urinating normally. Notified to use nasal saline spray. Post op apt confirmed and told to contact office with questions/concerns.        Signatures   Electronically signed by : Leigh Morrissey L.P.N.; May  5 2017 11:16AM CST     Needs to be liquid- can they order it and have available tomorrow for patient - if they are able to order it and have available tomorrow reach out to  and advise

## 2022-03-08 NOTE — TELEPHONE ENCOUNTER
Patient is scheduled on 3/10 @ 5:40 for a hospital follow up.   Please advise if you'd like the patient rescheduled.

## 2022-03-10 NOTE — PROGRESS NOTES
Jaimee is a 56 year old who is being evaluated via a billable video visit.      How would you like to obtain your AVS? MyChart Mailed   If the video visit is dropped, the invitation should be resent by: Text to cell phone: [phone]  Will anyone else be joining your video visit? No  is with patient.       Video Start Time: connected on video briefly but no audio. This was changed to a phone visit.     Assessment & Plan     Diagnoses and all orders for this visit:    Squamous cell cancer of tongue (H)  -     Hospice Referral; Future    Chronic neck pain    Continuous opioid dependence (H)       We discussed the concern of higher opioid dose causing respiratory suppression. We discussed pain management through oncology. Pt wanted to pursue DNR and comfort care through primary care. I think this is reasonable. We discussed Hospice care and pt wanted pursue this. For the time being, she can increase the Oxyzodone to 7.5 mg QID prn. We'll reassess in 1 week. In the meantime, hospice referral. Pt will also contact her oncologist office and let them know her intentions.     Return in about 1 week (around 3/17/2022) for Virtual visit, Pain management.    I spent a total of 30 minutes on the day of the visit.  doing chart review, history and exam, documentation and further activities as noted above         Maki Topete MD PhD  [facility]    Mahnaz Hermosillo is a 56 year old who presents for the following health issues     Follow up on recent hospital stay and discuss pain management.  Pt was hospitalized for acute respiratory failure after biopsy of SCC at the base of the tongue. She has severe COPD and needed to have tracheostomy home O2 and feeding tube. She is not able to talk. She signed with her  who related the message.     She had hospital discharge follow up on 3/3/2022 and this visit was primarily for pain control.     Prior to hospitalization, pt had chronic pain of the neck,  history of surgery and failed pain pump. She was maintained on fentanyl patch 100 mcg Q72 hours and vicodin 10/325 mg 5 tablets a day. At hospital discharge, she was given only Fentanyl patch 25 mcg Q72 hours. At the discharge follow up on 3/3/2022, she was given hydrocodone/acetaminophen liquid 7.5/325 mg with doses of 5 mg qid. This was used up and not helping enough.     Saw oncologist on 3/8/2022, prescribed oxycodone 5 mg QID. This is not helping enough.     Treatment plan involves chemo and radiation. She has a few appointments set up in the near future for PET, line placement and induction chemo.     Pt relayed through her  that she has decided not to pursue further treatment, and wanted to be DNR given severe COPD. She wanted to be comfortable, get the pain under control.     She also has long term disability for years. Has annual certification form from [employer]. Another one is needed for this year.         Review of Systems   Constitutional, HEENT, cardiovascular, pulmonary, gi and gu systems are negative, except as otherwise noted.      Objective           Vitals:  No vitals were obtained today due to virtual visit.    Physical Exam   GENERAL: pt sitting in sofa, had trach dome. No acute distress. Not able to speak. We were wilma to see each other on video but unable to work out the audio. We ended the video and had a phone visit instead.     Phone call duration: 27 minutes.

## 2022-03-14 NOTE — TELEPHONE ENCOUNTER
Patient has a video appointment scheduled on 3/17 that would lead into admin. Time. Would you like this rescheduled? Please advise.

## 2022-03-15 NOTE — TELEPHONE ENCOUNTER
Patient walked into clinic with paperwork she would like Dr. Topete to review and sign. Patient filled out a forms completion request, please contact patient with any further questions or concerns. Thank you.

## 2022-03-16 NOTE — TELEPHONE ENCOUNTER
Called [facility]. Their hospice program has a hospice medical director. I will ask the medical director to manage hospice care.

## 2022-03-16 NOTE — TELEPHONE ENCOUNTER
[employer] forms signed by Dr Topete: Called patients  Tom per patient request he will  forms tomorrow. Copy made for us and HIM.

## 2022-03-16 NOTE — TELEPHONE ENCOUNTER
Patient is scheduled to be discharged to home on 3/18/22. They would like Dr Topete to do the certificate of terminal illness.     They also would like verbal orders that Dr Topete to follow this patient for hospice. Please call the hospice nurse at [facility] @ [phone].    Manjula Doan

## 2022-03-16 NOTE — TELEPHONE ENCOUNTER
I spoke with her  Steve. Pt was hospitalized again at [facility] for shortness of breath, expected to be discharged on Friday. She decided to go with [facility] hospice care during this hospitalization. They will set up what's needed for her to return home.

## 2022-03-17 NOTE — TELEPHONE ENCOUNTER
"Patient's  calling on behalf of this patient.      The patient is wondering if Dr. Topete is willing to continue to manage the patient's disability for insurance purposes.  Per Dave, this is the only thing that is \"freaking her out\" about signing on with Hospice.      Ok to leave a detailed message on Dave's phone.    Tennille Quiroga RN, [facility]    "

## 2022-03-17 NOTE — TELEPHONE ENCOUNTER
I have already filled out her disability paperwork. It is good for a year. When it is due again, she can do a virtual visit with me the week before I get the form. then I can update her health and complete the form again.

## 2022-03-17 NOTE — TELEPHONE ENCOUNTER
Called patient's  and gave message from Dr. Topete.  Germán agrees to plan.    Tennille Quiroga RN, [facility]

## 2022-04-07 ENCOUNTER — TELEPHONE (OUTPATIENT)
Dept: FAMILY MEDICINE | Facility: CLINIC | Age: 57
End: 2022-04-07
Payer: COMMERCIAL

## 2022-04-08 ENCOUNTER — MEDICAL CORRESPONDENCE (OUTPATIENT)
Dept: HEALTH INFORMATION MANAGEMENT | Facility: CLINIC | Age: 57
End: 2022-04-08
Payer: COMMERCIAL

## 2022-04-08 NOTE — TELEPHONE ENCOUNTER
Form faxed to [phone] and placed in abstraction bin for scanning into patients chart    
Form received from [employer].  Placed on Dr. Topete's desk for signature.    
Forms completed, signed, left at completed bin for MA staff to fax     
WDL

## 2022-04-26 ENCOUNTER — TELEPHONE (OUTPATIENT)
Dept: FAMILY MEDICINE | Facility: CLINIC | Age: 57
End: 2022-04-26
Payer: COMMERCIAL

## 2022-04-27 ENCOUNTER — MEDICAL CORRESPONDENCE (OUTPATIENT)
Dept: HEALTH INFORMATION MANAGEMENT | Facility: CLINIC | Age: 57
End: 2022-04-27
Payer: COMMERCIAL
